# Patient Record
Sex: FEMALE | Race: BLACK OR AFRICAN AMERICAN | Employment: OTHER | URBAN - METROPOLITAN AREA
[De-identification: names, ages, dates, MRNs, and addresses within clinical notes are randomized per-mention and may not be internally consistent; named-entity substitution may affect disease eponyms.]

---

## 2017-01-04 ENCOUNTER — OFFICE VISIT (OUTPATIENT)
Dept: FAMILY MEDICINE | Facility: CLINIC | Age: 32
End: 2017-01-04
Payer: COMMERCIAL

## 2017-01-04 VITALS
TEMPERATURE: 99 F | HEART RATE: 74 BPM | SYSTOLIC BLOOD PRESSURE: 130 MMHG | DIASTOLIC BLOOD PRESSURE: 80 MMHG | RESPIRATION RATE: 16 BRPM | HEIGHT: 65 IN | OXYGEN SATURATION: 98 %

## 2017-01-04 DIAGNOSIS — Z23 NEED FOR PROPHYLACTIC VACCINATION AND INOCULATION AGAINST INFLUENZA: ICD-10-CM

## 2017-01-04 DIAGNOSIS — M62.838 MUSCLE SPASTICITY: Primary | ICD-10-CM

## 2017-01-04 DIAGNOSIS — E55.9 VITAMIN D DEFICIENCY: ICD-10-CM

## 2017-01-04 DIAGNOSIS — E03.9 ACQUIRED HYPOTHYROIDISM: ICD-10-CM

## 2017-01-04 DIAGNOSIS — G89.29 CHRONIC PAIN OF BOTH SHOULDERS: ICD-10-CM

## 2017-01-04 DIAGNOSIS — M25.511 CHRONIC PAIN OF BOTH SHOULDERS: ICD-10-CM

## 2017-01-04 DIAGNOSIS — M25.512 CHRONIC PAIN OF BOTH SHOULDERS: ICD-10-CM

## 2017-01-04 DIAGNOSIS — G82.20 PARAPLEGIA (H): ICD-10-CM

## 2017-01-04 PROCEDURE — 90686 IIV4 VACC NO PRSV 0.5 ML IM: CPT | Performed by: PHYSICIAN ASSISTANT

## 2017-01-04 PROCEDURE — 84439 ASSAY OF FREE THYROXINE: CPT | Performed by: PHYSICIAN ASSISTANT

## 2017-01-04 PROCEDURE — 84443 ASSAY THYROID STIM HORMONE: CPT | Performed by: PHYSICIAN ASSISTANT

## 2017-01-04 PROCEDURE — 36415 COLL VENOUS BLD VENIPUNCTURE: CPT | Performed by: PHYSICIAN ASSISTANT

## 2017-01-04 PROCEDURE — 99000 SPECIMEN HANDLING OFFICE-LAB: CPT | Performed by: PHYSICIAN ASSISTANT

## 2017-01-04 PROCEDURE — 99214 OFFICE O/P EST MOD 30 MIN: CPT | Mod: 25 | Performed by: PHYSICIAN ASSISTANT

## 2017-01-04 PROCEDURE — 82306 VITAMIN D 25 HYDROXY: CPT | Mod: 90 | Performed by: PHYSICIAN ASSISTANT

## 2017-01-04 PROCEDURE — 90471 IMMUNIZATION ADMIN: CPT | Performed by: PHYSICIAN ASSISTANT

## 2017-01-04 RX ORDER — CYCLOBENZAPRINE HCL 5 MG
5 TABLET ORAL 3 TIMES DAILY PRN
Qty: 90 TABLET | Refills: 3 | Status: SHIPPED | OUTPATIENT
Start: 2017-01-04 | End: 2017-03-22

## 2017-01-04 NOTE — NURSING NOTE
"Chief Complaint   Patient presents with     RECHECK     follow up for left shoulder pain        Initial /88 mmHg  Pulse 74  Temp(Src) 99  F (37.2  C) (Tympanic)  Resp 16  Ht 5' 5\" (1.651 m)  Wt   SpO2 98%  LMP 12/18/2016 Estimated body mass index is 26.79 kg/(m^2) as calculated from the following:    Height as of this encounter: 5' 5\" (1.651 m).    Weight as of 9/28/16: 161 lb (73.029 kg).  BP completed using cuff size: destinee Yi CMA      "

## 2017-01-04 NOTE — PROGRESS NOTES
Injectable Influenza Immunization Documentation    1.  Is the person to be vaccinated sick today?  No    2. Does the person to be vaccinated have an allergy to eggs or to a component of the vaccine?  No    3. Has the person to be vaccinated today ever had a serious reaction to influenza vaccine in the past?  No    4. Has the person to be vaccinated ever had Guillain-[place] syndrome?  No     Form completed by Yenni
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
## 2017-01-04 NOTE — Clinical Note
[facility]  [address]  [address]  [facility] [address]  [phone]                                                                                                           Gloria Bang  [address] [place] [address] [address]  [facility] [address]    January 4, 2017          To Whom It May Concern:    Patient is under my care and I certify she is unable to attend citizenship classes due to underlying medical conditions that make it difficult to leave her home without significant assistance. Underlying medical conditions also make it difficult to sit through class without significant discomfort.      Sincerely,          Gina Eli PA-C

## 2017-01-06 ENCOUNTER — TELEPHONE (OUTPATIENT)
Dept: FAMILY MEDICINE | Facility: CLINIC | Age: 32
End: 2017-01-06

## 2017-01-06 DIAGNOSIS — E03.9 ACQUIRED HYPOTHYROIDISM: Primary | ICD-10-CM

## 2017-01-06 LAB
DEPRECATED CALCIDIOL+CALCIFEROL SERPL-MC: 23 UG/L (ref 20–75)
T4 FREE SERPL-MCNC: 0.92 NG/DL (ref 0.76–1.46)
TSH SERPL DL<=0.005 MIU/L-ACNC: 10.27 MU/L (ref 0.4–4)

## 2017-01-06 RX ORDER — LEVOTHYROXINE SODIUM 88 UG/1
88 TABLET ORAL DAILY
Qty: 90 TABLET | Refills: 3 | Status: SHIPPED | OUTPATIENT
Start: 2017-01-06 | End: 2017-03-22

## 2017-01-06 NOTE — TELEPHONE ENCOUNTER
Please contact patient regarding her labs results (will need ):    Your TSH is elevated, suggesting your thyroid medication dose is too low. The actual thyroid level (T4) in your blood is normal. The goal of treatment is to have both your TSH and your T4 normal. I recommend starting a higher dose of your thyroid medication (88 mcg daily); I will send in a new prescription to your pharmacy. Please come in for a lab visit in 12 weeks to recheck your thyroid studies.    Your vitamin D level was normal.

## 2017-01-09 ENCOUNTER — TELEPHONE (OUTPATIENT)
Dept: FAMILY MEDICINE | Facility: CLINIC | Age: 32
End: 2017-01-09

## 2017-01-09 DIAGNOSIS — E55.9 VITAMIN D DEFICIENCY DISEASE: Primary | ICD-10-CM

## 2017-01-09 RX ORDER — CHOLECALCIFEROL (VITAMIN D3) 50 MCG
2000 TABLET ORAL DAILY
Qty: 90 TABLET | Refills: 4 | Status: SHIPPED | OUTPATIENT
Start: 2017-01-09 | End: 2018-05-07

## 2017-01-09 NOTE — TELEPHONE ENCOUNTER
Informed patient and sibling of message below. She verbalized understanding and agreement with plan.

## 2017-01-09 NOTE — TELEPHONE ENCOUNTER
"Pharmacist called re:     levothyroxine (SYNTHROID/LEVOTHROID) 88 MCG tablet 90 tablet 3 1/6/2017  No      Sig: Take 1 tablet (88 mcg) by mouth daily TAKE 1 TABLET (75 MCG) BY MOUTH DAILY     Should patient take 88mcg or 75mcg?     Per Gina Eli's note and Huddle: 88mcg.   \"I recommend starting a higher dose of your thyroid medication (88 mcg daily); I will send in a new prescription to your pharmacy. Please come in for a lab visit in 12 weeks to recheck your thyroid studies.\"  Pharmacist will include: pt is due for OV in 12 weeks for thyroid studies.   "

## 2017-01-09 NOTE — TELEPHONE ENCOUNTER
Patient and sibling Liz called requesting lab results. Please advice on results. Informed pt vitamin D normal low. Asked for vitamin D Rx.    TSH 10.27 (H) 0.40 - 4.00 mU/L     T4 Free 0.92 0.76 - 1.46 ng/dL     Component Value Ref Range & Units Status      Vitamin D Deficiency screening 23 20 - 75 ug/L Final     Comment:      Season, race, dietary intake, and treatment affect the concentration of    25-hydroxy-Vitamin D. Values may decrease during winter months and increase    during summer months. Values 20-29 ug/L may indicate Vitamin D insufficiency    and values <20 ug/L may indicate Vitamin D deficiency.    Vitamin D determination is routinely performed by an immunoassay specific for    25 hydroxyvitamin D3.  If an individual is on vitamin D2 (ergocalciferol)    supplementation, please specify 25 OH vitamin D2 and D3 level determination   by    LCMSMS test [id number].

## 2017-01-09 NOTE — TELEPHONE ENCOUNTER
"From results encounter on 1/6/2017: \"Your TSH is elevated, suggesting your thyroid medication dose is too low. The actual thyroid level (T4) in your blood is normal. The goal of treatment is to have both your TSH and your T4 normal. I recommend starting a higher dose of your thyroid medication (88 mcg daily); I will send in a new prescription to your pharmacy. Please come in for a lab visit in 12 weeks to recheck your thyroid studies.\"    Will send rx for vitamin D supplement.  "

## 2017-01-23 ENCOUNTER — TELEPHONE (OUTPATIENT)
Dept: FAMILY MEDICINE | Facility: CLINIC | Age: 32
End: 2017-01-23

## 2017-01-23 NOTE — Clinical Note
[facility]  [address]  [facility] [address]  [phone]                                                                                                           Gloria Bang  [address]  [facility] [address]    January 23, 2017          Dear Gloria Bang,      We care about your well-being!  In order to ensure we are providing the best quality care, we have reviewed your chart and see that you are due for:     __X___ Physical   __X___ Pap Smear   _____ Mammogram   _____ Diabetes Followup   _____ Hypertension Followup   _____ Cholesterol Followup (Provider Appointment)   _____ Cholesterol Test (Lab-Only Appointment)   _____ Other:       We can assist you in scheduling these appointments at [phone].    If you have had (or plan to have) any of these tests done at a facility other than [facility] or a [facility], please have the results from these tests sent to your primary physician at [facility].               Sincerely,    TONI Rodriguez

## 2017-01-23 NOTE — TELEPHONE ENCOUNTER
Panel Management Review      Patient has the following on her problem list: None      Composite cancer screening  Chart review shows that this patient is due/due soon for the following Pap Smear  Summary:    Patient is due/failing the following:   PAP and PHYSICAL    Action needed:   Patient needs office visit for Physical and Pap.    Type of outreach:    Sent letter.    Questions for provider review:    None                                                                                                                                    Danielle Yi CMA       Chart routed to .

## 2017-02-14 ENCOUNTER — HOSPITAL ENCOUNTER (OUTPATIENT)
Dept: PHYSICAL THERAPY | Facility: CLINIC | Age: 32
Setting detail: THERAPIES SERIES
End: 2017-02-14
Attending: PHYSICIAN ASSISTANT
Payer: COMMERCIAL

## 2017-02-14 PROCEDURE — 97161 PT EVAL LOW COMPLEX 20 MIN: CPT | Mod: GP | Performed by: PHYSICAL THERAPIST

## 2017-02-14 PROCEDURE — 40000719 ZZHC STATISTIC PT DEPARTMENT NEURO VISIT: Performed by: PHYSICAL THERAPIST

## 2017-02-15 DIAGNOSIS — M62.838 MUSCLE SPASTICITY: Primary | ICD-10-CM

## 2017-02-15 DIAGNOSIS — G82.20 PARAPLEGIA (H): ICD-10-CM
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
## 2017-03-22 ENCOUNTER — OFFICE VISIT (OUTPATIENT)
Dept: FAMILY MEDICINE | Facility: CLINIC | Age: 32
End: 2017-03-22
Payer: COMMERCIAL

## 2017-03-22 VITALS — TEMPERATURE: 97.7 F | RESPIRATION RATE: 16 BRPM | HEART RATE: 86 BPM | OXYGEN SATURATION: 100 %

## 2017-03-22 DIAGNOSIS — N39.45 CONTINUOUS LEAKAGE(788.37): ICD-10-CM

## 2017-03-22 DIAGNOSIS — G82.20 PARAPLEGIA (H): Primary | ICD-10-CM

## 2017-03-22 DIAGNOSIS — M62.838 MUSCLE SPASTICITY: ICD-10-CM

## 2017-03-22 DIAGNOSIS — E03.9 ACQUIRED HYPOTHYROIDISM: ICD-10-CM

## 2017-03-22 LAB
T4 FREE SERPL-MCNC: 0.82 NG/DL (ref 0.76–1.46)
TSH SERPL DL<=0.005 MIU/L-ACNC: 13.18 MU/L (ref 0.4–4)

## 2017-03-22 PROCEDURE — 99214 OFFICE O/P EST MOD 30 MIN: CPT | Performed by: PHYSICIAN ASSISTANT

## 2017-03-22 PROCEDURE — 84439 ASSAY OF FREE THYROXINE: CPT | Performed by: PHYSICIAN ASSISTANT

## 2017-03-22 PROCEDURE — 36415 COLL VENOUS BLD VENIPUNCTURE: CPT | Performed by: PHYSICIAN ASSISTANT

## 2017-03-22 PROCEDURE — 84443 ASSAY THYROID STIM HORMONE: CPT | Performed by: PHYSICIAN ASSISTANT
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
## 2017-03-22 NOTE — TELEPHONE ENCOUNTER
Please contact patient with recent lab results (will need ):    Your TSH is still high, indicating your thyroid medication dose is too low. Please verify patient is taking 88 mcg dose.     If not, please start taking 88 mcg dose. If already on 88 mcg, would like to increase to 100 mcg dose and recheck labs in 10-12 weeks.    100 mcg dose pended, ok to send to pharmacy if indicated. Future labs ordered.

## 2017-03-22 NOTE — MR AVS SNAPSHOT
After Visit Summary   3/22/2017    Gloria Bang    MRN: [id number]           Patient Information     Date Of Birth          1985        Visit Information        Provider Department      3/22/2017 9:10 AM Gina Eli PA-C; [employer] [facility]        Today's Diagnoses     Paraplegia (H)    -  1    Continuous leakage        Acquired hypothyroidism        Muscle spasticity           Follow-ups after your visit        Your next 10 appointments already scheduled     Mar 29, 2017  8:00 AM CDT   (Arrive by 7:45 AM)   New Patient Visit with Elio Dejesus MD   [facility] Physical Medicine and Rehabilitation ([facility])    [address]  3rd Floor  [facility] [address] [phone]              Future tests that were ordered for you today     Open Future Orders        Priority Expected Expires Ordered    **TSH with free T4 reflex FUTURE 2mo Routine 5/21/2017 7/20/2017 3/22/2017            Who to contact     If you have questions or need follow up information about today's clinic visit or your schedule please contact [facility] directly at [phone].  Normal or non-critical lab and imaging results will be communicated to you by MyChart, letter or phone within 4 business days after the clinic has received the results. If you do not hear from us within 7 days, please contact the clinic through [employer]chmasch[employer] publishinghart or phone. If you have a critical or abnormal lab result, we will notify you by phone as soon as possible.  Submit refill requests through [employer] or call your pharmacy and they will forward the refill request to us. Please allow 3 business days for your refill to be completed.          Additional Information About Your Visit        menschmaschine publishinghart Information     [employer] lets you send messages to your doctor, view your test results, renew your prescriptions, schedule appointments  "and more. To sign up, go to [email or web address] . Click on \"Log in\" on the left side of the screen, which will take you to the Welcome page. Then click on \"Sign up Now\" on the right side of the page.     You will be asked to enter the access code listed below, as well as some personal information. Please follow the directions to create your username and password.     Your access code is: [email or web address]  Expires: 2017 12:10 PM     Your access code will  in 90 days. If you need help or a new code, please call your [place] clinic or [phone].        Care EveryWhere ID     This is your Care EveryWhere ID. This could be used by other organizations to access your [place] medical records  [id number]        Your Vitals Were     Pulse Temperature Respirations Pulse Oximetry          86 97.7  F (36.5  C) (Tympanic) 16 100%         Blood Pressure from Last 3 Encounters:   17 130/80   16 134/86   16 140/75    Weight from Last 3 Encounters:   16 161 lb (73 kg)   16 161 lb (73 kg)   16 161 lb (73 kg)              We Performed the Following     T4 free     TSH with free T4 reflex          Today's Medication Changes          These changes are accurate as of: 3/22/17  3:11 PM.  If you have any questions, ask your nurse or doctor.               These medicines have changed or have updated prescriptions.        Dose/Directions    bisacodyl 10 MG Suppository   Commonly known as:  DULCOLAX   This may have changed:  Another medication with the same name was removed. Continue taking this medication, and follow the directions you see here.   Used for:  Other constipation   Changed by:  Gina Eli PA-C        Dose:  10 mg   Place 1 suppository (10 mg) rectally daily as needed   Quantity:  30 suppository   Refills:  11       cetirizine 10 MG tablet   Commonly known as:  zyrTEC   This may have changed:    - when to take this  - reasons to take this   Used for:  Chronic rhinitis "        Dose:  10 mg   Take 1 tablet (10 mg) by mouth every evening   Quantity:  90 tablet   Refills:  3         Stop taking these medicines if you haven't already. Please contact your care team if you have questions.     cyclobenzaprine 5 MG tablet   Commonly known as:  FLEXERIL   Stopped by:  Gina Eli PA-C           dantrolene 25 MG capsule   Commonly known as:  DANTRIUM   Stopped by:  Gina Eli PA-C                Where to get your medicines      Some of these will need a paper prescription and others can be bought over the counter.  Ask your nurse if you have questions.     Bring a paper prescription for each of these medications     order for DME    order for DME                Primary Care Provider Office Phone # Fax #    Gina Eli PA-C [phone]       [facility] [address] [facility] [address]  [facility] [address]        Thank you!     Thank you for choosing [facility]  for your care. Our goal is always to provide you with excellent care. Hearing back from our patients is one way we can continue to improve our services. Please take a few minutes to complete the written survey that you may receive in the mail after your visit with us. Thank you!             Your Updated Medication List - Protect others around you: Learn how to safely use, store and throw away your medicines at www.disposemymeds.org.          This list is accurate as of: 3/22/17  3:11 PM.  Always use your most recent med list.                   Brand Name Dispense Instructions for use    KASSANDRA REID GREASELESS cream     113 g    Apply topically every 6 hours as needed       bisacodyl 10 MG Suppository    DULCOLAX    30 suppository    Place 1 suppository (10 mg) rectally daily as needed       cetirizine 10 MG tablet    zyrTEC    90 tablet    Take 1 tablet (10 mg) by mouth every evening       chlorhexidine 0.12 % solution    PERIDEX         * cholecalciferol  1000 UNIT tablet    vitamin D         * vitamin D 2000 UNITS tablet     90 tablet    Take 2,000 Units by mouth daily       fluticasone 50 MCG/ACT spray    FLONASE    3 Bottle    Spray 1 spray into both nostrils 2 times daily       folic acid 1 MG tablet    FOLVITE    100 tablet    Take 1 tablet (1 mg) by mouth daily       ibuprofen 600 MG tablet    ADVIL/MOTRIN    30 tablet    Take 1 tablet (600 mg) by mouth every 8 hours as needed for moderate pain       labetalol 100 MG tablet    NORMODYNE    90 tablet    Take 1 tablet (100 mg) by mouth daily       levothyroxine 88 MCG tablet    SYNTHROID/LEVOTHROID    90 tablet    Take 1 tablet (88 mcg) by mouth daily TAKE 1 TABLET (75 MCG) BY MOUTH DAILY       order for DME     1 Device    Equipment being ordered: electric williams lift that can be operated by one person.Patient's Height is 5 feet 9 inches; weight as of 8/15/13 is 160 pounds       * order for DME     1 Box    Equipment being ordered: gauze 4*4 pack of 100, refill prn       * order for DME     100 Units    Equipment being ordered: 4*4 gauzes for dressing change       * order for DME     1 each    BP cuff, brand as covered by insurance.  Dx: HTN       * order for DME     1 each    BP cuff, brand as covered by insurance.  Dx: HTN       * order for DME     90 Units    Equipment being ordered:  adult diapers for urinary incontinence - Fax to [employer] [phone]       * order for DME     120 pad    Equipment being ordered: Chux       Ovulation Predictor Kit     1 kit    Use daily Day 7 and 8 of cycle       * Notice:  This list has 8 medication(s) that are the same as other medications prescribed for you. Read the directions carefully, and ask your doctor or other care provider to review them with you.

## 2017-03-23 RX ORDER — LEVOTHYROXINE SODIUM 100 UG/1
100 TABLET ORAL DAILY
Qty: 90 TABLET | Refills: 0 | Status: SHIPPED | OUTPATIENT
Start: 2017-03-23 | End: 2017-06-05

## 2017-03-23 NOTE — TELEPHONE ENCOUNTER
Patient and sister called requesting lab results. Informed pt of TSH results. She reports she is taking 88 mcg dose. Rx sent with 100 mcg dose and advised pt recheck in 10-12 wks as requested below. Pt verbalized understanding and agreement with plan.

## 2017-03-29 ENCOUNTER — OFFICE VISIT (OUTPATIENT)
Dept: PHYSICAL MEDICINE AND REHAB | Facility: CLINIC | Age: 32
End: 2017-03-29

## 2017-03-29 VITALS — HEIGHT: 65 IN | SYSTOLIC BLOOD PRESSURE: 164 MMHG | DIASTOLIC BLOOD PRESSURE: 80 MMHG | HEART RATE: 83 BPM

## 2017-03-29 DIAGNOSIS — G82.20 PARAPLEGIA (H): ICD-10-CM

## 2017-03-29 DIAGNOSIS — M62.838 MUSCLE SPASM: Primary | ICD-10-CM

## 2017-03-29 RX ORDER — TIZANIDINE 2 MG/1
2 TABLET ORAL 3 TIMES DAILY
Qty: 90 TABLET | Refills: 3 | Status: SHIPPED | OUTPATIENT
Start: 2017-03-29 | End: 2018-02-19

## 2017-03-29 ASSESSMENT — PAIN SCALES - GENERAL: PAINLEVEL: NO PAIN (0)

## 2017-03-29 NOTE — MR AVS SNAPSHOT
After Visit Summary   3/29/2017    Gloria Bang    MRN: [id number]           Patient Information     Date Of Birth          1985        Visit Information        Provider Department      3/29/2017 7:45 AM Elio Dejesus MD; [employer] [facility] Physical Medicine and Rehabilitation        Today's Diagnoses     Muscle spasm    -  1       Follow-ups after your visit        Your next 10 appointments already scheduled     May 24, 2017  8:00 AM CDT   (Arrive by 7:45 AM)   Return Visit with Elio Dejesus MD   [facility] Physical Medicine and Rehabilitation ([facility])    [address] [place] [address] [phone]              Who to contact     Please call your clinic at [phone] to:    Ask questions about your health    Make or cancel appointments    Discuss your medicines    Learn about your test results    Speak to your doctor   If you have compliments or concerns about an experience at your clinic, or if you wish to file a complaint, please contact [facility] Patient Relations at [phone] or email us at [email or web address]         Additional Information About Your Visit        MyChart Information     [employer] is an electronic gateway that provides easy, online access to your medical records. With [employer], you can request a clinic appointment, read your test results, renew a prescription or communicate with your care team.     To sign up for [employer] visit the website at [email or web address]   You will be asked to enter the access code listed below, as well as some personal information. Please follow the directions to create your username and password.     Your access code is: [email or web address]  Expires: 2017 12:10 PM     Your access code will  in 90 days. If you need help or a new code, please contact your [facility] or call [phone] for  "assistance.        Care EveryWhere ID     This is your Care EveryWhere ID. This could be used by other organizations to access your [place] medical records  [id number]        Your Vitals Were     Pulse Height                83 1.651 m (5' 5\")           Blood Pressure from Last 3 Encounters:   03/29/17 164/80   01/04/17 130/80   09/28/16 134/86    Weight from Last 3 Encounters:   09/28/16 73 kg (161 lb)   03/29/16 73 kg (161 lb)   02/23/16 73 kg (161 lb)              Today, you had the following     No orders found for display         Today's Medication Changes          These changes are accurate as of: 3/29/17  9:39 AM.  If you have any questions, ask your nurse or doctor.               Start taking these medicines.        Dose/Directions    tiZANidine 2 MG tablet   Commonly known as:  ZANAFLEX   Used for:  Muscle spasm   Started by:  Elio Dejesus MD        Dose:  2 mg   Take 1 tablet (2 mg) by mouth 3 times daily   Quantity:  90 tablet   Refills:  3         These medicines have changed or have updated prescriptions.        Dose/Directions    cetirizine 10 MG tablet   Commonly known as:  zyrTEC   This may have changed:    - when to take this  - reasons to take this   Used for:  Chronic rhinitis        Dose:  10 mg   Take 1 tablet (10 mg) by mouth every evening   Quantity:  90 tablet   Refills:  3            Where to get your medicines      These medications were sent to [facility] - [address]  [address] [facility] [address]     Phone:  [phone]     tiZANidine 2 MG tablet                Primary Care Provider Office Phone # Fax #    Gina Eli PA-C [phone]       [facility] [address]  [facility] [address]        Thank you!     Thank you for choosing [facility] PHYSICAL MEDICINE AND REHABILITATION  for your care. Our goal is always to provide you with excellent care. Hearing back from our patients is one way we can continue " to improve our services. Please take a few minutes to complete the written survey that you may receive in the mail after your visit with us. Thank you!             Your Updated Medication List - Protect others around you: Learn how to safely use, store and throw away your medicines at www.disposemymeds.org.          This list is accurate as of: 3/29/17  9:39 AM.  Always use your most recent med list.                   Brand Name Dispense Instructions for use    KASSANDRA REID GREASELESS cream     113 g    Apply topically every 6 hours as needed       bisacodyl 10 MG Suppository    DULCOLAX    30 suppository    Place 1 suppository (10 mg) rectally daily as needed       cetirizine 10 MG tablet    zyrTEC    90 tablet    Take 1 tablet (10 mg) by mouth every evening       chlorhexidine 0.12 % solution    PERIDEX         * cholecalciferol 1000 UNIT tablet    vitamin D         * vitamin D 2000 UNITS tablet     90 tablet    Take 2,000 Units by mouth daily       fluticasone 50 MCG/ACT spray    FLONASE    3 Bottle    Spray 1 spray into both nostrils 2 times daily       folic acid 1 MG tablet    FOLVITE    100 tablet    Take 1 tablet (1 mg) by mouth daily       ibuprofen 600 MG tablet    ADVIL/MOTRIN    30 tablet    Take 1 tablet (600 mg) by mouth every 8 hours as needed for moderate pain       labetalol 100 MG tablet    NORMODYNE    90 tablet    Take 1 tablet (100 mg) by mouth daily       levothyroxine 100 MCG tablet    SYNTHROID/LEVOTHROID    90 tablet    Take 1 tablet (100 mcg) by mouth daily       order for DME     1 Device    Equipment being ordered: electric williams lift that can be operated by one person.Patient's Height is 5 feet 9 inches; weight as of 8/15/13 is 160 pounds       * order for DME     1 Box    Equipment being ordered: gauze 4*4 pack of 100, refill prn       * order for DME     100 Units    Equipment being ordered: 4*4 gauzes for dressing change       * order for DME     1 each    BP cuff, brand as covered by  insurance.  Dx: HTN       * order for DME     1 each    BP cuff, brand as covered by insurance.  Dx: HTN       * order for DME     90 Units    Equipment being ordered:  adult diapers for urinary incontinence - Fax to [employer] [phone]       * order for DME     120 pad    Equipment being ordered: Chux       Ovulation Predictor Kit     1 kit    Use daily Day 7 and 8 of cycle       tiZANidine 2 MG tablet    ZANAFLEX    90 tablet    Take 1 tablet (2 mg) by mouth 3 times daily       * Notice:  This list has 8 medication(s) that are the same as other medications prescribed for you. Read the directions carefully, and ask your doctor or other care provider to review them with you.
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
## 2017-04-06 ENCOUNTER — TELEPHONE (OUTPATIENT)
Dept: FAMILY MEDICINE | Facility: CLINIC | Age: 32
End: 2017-04-06

## 2017-04-06 NOTE — TELEPHONE ENCOUNTER
Reason for Call:  Form, our goal is to have forms completed with 72 hours, however, some forms may require a visit or additional information.    Type of letter, form or note:      Who is the form from?: [employer] (if other please explain)    Where did the form come from: form was faxed in    What clinic location was the form placed at?: [facility]    Where the form was placed: 's Box: TONI Rodriguez    What number is listed as a contact on the form?: [phone]       Additional comments:     Call taken on 4/6/2017 at 2:28 PM by Zamzam Granados

## 2017-04-13 ENCOUNTER — TELEPHONE (OUTPATIENT)
Dept: PHYSICAL MEDICINE AND REHAB | Facility: CLINIC | Age: 32
End: 2017-04-13

## 2017-04-13 NOTE — TELEPHONE ENCOUNTER
Janie, patient's sister called with update on the effectiveness of the Tizanidine dosing for her muscle spasticity. Patient states that she gets very sleepy and cannot keep her eyes open and cannot enjoy her day because she is so tired. She is attending school and falls asleep. The medication does help relax the leg muscles, but it is not worth it, if she cannot function during the day. She is presently taking Tizanidine 2 mg tablets TID. She was instructed to try one half tablet three times a day. If she continues to be too sleepy with this dosing, she will call PMR clinic to discuss dosing. Dr Dejesus notified of above.

## 2017-04-20 ENCOUNTER — TELEPHONE (OUTPATIENT)
Dept: FAMILY MEDICINE | Facility: CLINIC | Age: 32
End: 2017-04-20

## 2017-04-20 NOTE — LETTER
[facility]  [address]  [place] [address]  [phone]                                                                                                           Gloria Bang  [address]  [place] [address]    April 20, 2017          Dear Gloria Bang,      We care about your well-being!  In order to ensure we are providing the best quality care, we have reviewed your chart and see that you are due for:     __x___ Physical   __x___ Pap Smear   _____ Mammogram   _____ Diabetes Followup   _____ Hypertension Followup   _____ Cholesterol Followup (Provider Appointment)   _____ Cholesterol Test (Lab-Only Appointment)   _____ Other:       We can assist you in scheduling these appointments at [phone].    If you have had (or plan to have) any of these tests done at a facility other than [facility] or a [facility], please have the results from these tests sent to your primary physician at [facility].                 Sincerely,    TONI Rodriguez

## 2017-04-20 NOTE — TELEPHONE ENCOUNTER
Panel Management Review      Patient has the following on her problem list:     Hypertension   Last three blood pressure readings:  BP Readings from Last 3 Encounters:   03/29/17 164/80   01/04/17 130/80   09/28/16 134/86     Blood pressure: FAILED    HTN Guidelines:  Age 18-59 BP range:  Less than 140/90  Age 60-85 with Diabetes:  Less than 140/90  Age 60-85 without Diabetes:  less than 150/90      Composite cancer screening  Chart review shows that this patient is due/due soon for the following Pap Smear  Summary:    Patient is due/failing the following:   BP CHECK, PAP and PHYSICAL    Action needed:   Patient needs office visit for physical/PAP    Type of outreach:    Sent letter.    Questions for provider review:    None                                                                                                                                    [facility]       Chart routed to self.

## 2017-05-10 DIAGNOSIS — M62.838 OTHER MUSCLE SPASM: Primary | ICD-10-CM

## 2017-05-24 ENCOUNTER — OFFICE VISIT (OUTPATIENT)
Dept: PHYSICAL MEDICINE AND REHAB | Facility: CLINIC | Age: 32
End: 2017-05-24

## 2017-05-24 VITALS — HEIGHT: 65 IN | SYSTOLIC BLOOD PRESSURE: 163 MMHG | HEART RATE: 78 BPM | DIASTOLIC BLOOD PRESSURE: 68 MMHG

## 2017-05-24 DIAGNOSIS — M62.838 MUSCLE SPASTICITY: Primary | ICD-10-CM

## 2017-05-24 DIAGNOSIS — G82.20 PARAPLEGIA (H): ICD-10-CM

## 2017-05-24 ASSESSMENT — PAIN SCALES - GENERAL: PAINLEVEL: NO PAIN (0)
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
## 2017-05-24 NOTE — MR AVS SNAPSHOT
After Visit Summary   5/24/2017    Gloria Bang    MRN: [id number]           Patient Information     Date Of Birth          1985        Visit Information        Provider Department      5/24/2017 7:45 AM Elio Dejesus MD; [employer] [facility]         Follow-ups after your visit        Your next 10 appointments already scheduled     Aug 23, 2017  8:40 AM CDT   (Arrive by 8:25 AM)   Return Botox with Elio Dejesus MD   [facility] ([facility])    [address] [place] [address] [id number] [phone]            Nov 15, 2017  8:40 AM CST   (Arrive by 8:25 AM)   Return Botox with Elio Dejesus MD   [facility] ([facility])    [address] [place] [address] [id number] [phone]              Who to contact     Please call your clinic at [phone] to:    Ask questions about your health    Make or cancel appointments    Discuss your medicines    Learn about your test results    Speak to your doctor   If you have compliments or concerns about an experience at your clinic, or if you wish to file a complaint, please contact [facility] Patient Relations at [phone] or email us at [email or web address]         Additional Information About Your Visit        MyChart Information     [employer] is an electronic gateway that provides easy, online access to your medical records. With [employer], you can request a clinic appointment, read your test results, renew a prescription or communicate with your care team.     To sign up for [employer] visit the website at [email or web address]   You will be asked to enter the access code listed below, as well as some personal information. Please follow the directions to create your username and password.     Your access code  "is: [email or web address]  Expires: 2017  6:30 AM     Your access code will  in 90 days. If you need help or a new code, please contact your [facility] or call [phone] for assistance.        Care EveryWhere ID     This is your Care EveryWhere ID. This could be used by other organizations to access your [place] medical records  [id number]        Your Vitals Were     Pulse Height                78 1.651 m (5' 5\")           Blood Pressure from Last 3 Encounters:   17 163/68   17 164/80   17 130/80    Weight from Last 3 Encounters:   16 73 kg (161 lb)   16 73 kg (161 lb)   16 73 kg (161 lb)              Today, you had the following     No orders found for display       Primary Care Provider Office Phone # Fax #    Gina Eli PA-C [phone]       [facility] [address] [address]  [facility] [address]        Thank you!     Thank you for choosing [facility]  for your care. Our goal is always to provide you with excellent care. Hearing back from our patients is one way we can continue to improve our services. Please take a few minutes to complete the written survey that you may receive in the mail after your visit with us. Thank you!             Your Updated Medication List - Protect others around you: Learn how to safely use, store and throw away your medicines at www.disposemymeds.org.          This list is accurate as of: 17  9:08 AM.  Always use your most recent med list.                   Brand Name Dispense Instructions for use    KASSANDRA REID GREASELESS cream     113 g    Apply topically every 6 hours as needed       bisacodyl 10 MG Suppository    DULCOLAX    30 suppository    Place 1 suppository (10 mg) rectally daily as needed       botulinum toxin type A 100 UNITS injection    BOTOX    400 Units    Inject 400 Units into the muscle every 3 months       " chlorhexidine 0.12 % solution    PERIDEX         * cholecalciferol 1000 UNIT tablet    vitamin D         * vitamin D 2000 UNITS tablet     90 tablet    Take 2,000 Units by mouth daily       fluticasone 50 MCG/ACT spray    FLONASE    3 Bottle    Spray 1 spray into both nostrils 2 times daily       labetalol 100 MG tablet    NORMODYNE    90 tablet    Take 1 tablet (100 mg) by mouth daily       levothyroxine 100 MCG tablet    SYNTHROID/LEVOTHROID    90 tablet    Take 1 tablet (100 mcg) by mouth daily       order for DME     1 Device    Equipment being ordered: electric williams lift that can be operated by one person.Patient's Height is 5 feet 9 inches; weight as of 8/15/13 is 160 pounds       * order for DME     1 Box    Equipment being ordered: gauze 4*4 pack of 100, refill prn       * order for DME     100 Units    Equipment being ordered: 4*4 gauzes for dressing change       * order for DME     1 each    BP cuff, brand as covered by insurance.  Dx: HTN       * order for DME     1 each    BP cuff, brand as covered by insurance.  Dx: HTN       * order for DME     90 Units    Equipment being ordered:  adult diapers for urinary incontinence - Fax to [employer] [phone]       * order for DME     120 pad    Equipment being ordered: [employer]       Ovulation Predictor Kit     1 kit    Use daily Day 7 and 8 of cycle       tiZANidine 2 MG tablet    ZANAFLEX    90 tablet    Take 1 tablet (2 mg) by mouth 3 times daily       * Notice:  This list has 8 medication(s) that are the same as other medications prescribed for you. Read the directions carefully, and ask your doctor or other care provider to review them with you.
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
## 2017-06-02 ENCOUNTER — OFFICE VISIT (OUTPATIENT)
Dept: FAMILY MEDICINE | Facility: CLINIC | Age: 32
End: 2017-06-02
Payer: COMMERCIAL

## 2017-06-02 ENCOUNTER — TELEPHONE (OUTPATIENT)
Dept: FAMILY MEDICINE | Facility: CLINIC | Age: 32
End: 2017-06-02

## 2017-06-02 VITALS
HEART RATE: 77 BPM | DIASTOLIC BLOOD PRESSURE: 70 MMHG | RESPIRATION RATE: 16 BRPM | BODY MASS INDEX: 26.79 KG/M2 | WEIGHT: 161 LBS | SYSTOLIC BLOOD PRESSURE: 136 MMHG | TEMPERATURE: 98 F

## 2017-06-02 DIAGNOSIS — G82.20 PARAPLEGIA (H): ICD-10-CM

## 2017-06-02 DIAGNOSIS — E03.9 ACQUIRED HYPOTHYROIDISM: Primary | ICD-10-CM

## 2017-06-02 DIAGNOSIS — M62.838 MUSCLE SPASTICITY: Primary | ICD-10-CM

## 2017-06-02 PROCEDURE — 84443 ASSAY THYROID STIM HORMONE: CPT | Performed by: PHYSICIAN ASSISTANT

## 2017-06-02 PROCEDURE — 99214 OFFICE O/P EST MOD 30 MIN: CPT | Performed by: PHYSICIAN ASSISTANT

## 2017-06-02 PROCEDURE — 36415 COLL VENOUS BLD VENIPUNCTURE: CPT | Performed by: PHYSICIAN ASSISTANT

## 2017-06-02 RX ORDER — CAPSAICIN 0.025 %
1 CREAM (GRAM) TOPICAL 3 TIMES DAILY PRN
Qty: 60 G | Refills: 11 | Status: SHIPPED | OUTPATIENT
Start: 2017-06-02 | End: 2018-02-19
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
## 2017-06-02 NOTE — NURSING NOTE
"Chief Complaint   Patient presents with     Recheck Medication     Thyroid Problem       Initial /70  Pulse 77  Temp 98  F (36.7  C) (Tympanic)  Resp 16  Wt 161 lb (73 kg)  BMI 26.79 kg/m2 Estimated body mass index is 26.79 kg/(m^2) as calculated from the following:    Height as of 5/24/17: 5' 5\" (1.651 m).    Weight as of this encounter: 161 lb (73 kg).  Medication Reconciliation: complete     Clare Escalera CMA      "

## 2017-06-02 NOTE — MR AVS SNAPSHOT
After Visit Summary   6/2/2017    Gloria Bang    MRN: [id number]           Patient Information     Date Of Birth          1985        Visit Information        Provider Department      6/2/2017 8:10 AM Gina Eli PA-C; [employer] [facility]        Today's Diagnoses     Acquired hypothyroidism    -  1    Paraplegia (H)          Care Instructions    763-          Follow-ups after your visit        Your next 10 appointments already scheduled     Aug 23, 2017  8:40 AM CDT   (Arrive by 8:25 AM)   Return Botox with Elio Dejesus MD   [facility] Physical Medicine and Rehabilitation ([facility])    [address] [place] [address] [id number] [phone]            Nov 15, 2017  8:40 AM CST   (Arrive by 8:25 AM)   Return Botox with Elio Dejesus MD   [facility] Physical Medicine and Rehabilitation ([facility])    [address] [place] [id number] [phone]              Who to contact     If you have questions or need follow up information about today's clinic visit or your schedule please contact [facility] directly at [phone].  Normal or non-critical lab and imaging results will be communicated to you by MyChart, letter or phone within 4 business days after the clinic has received the results. If you do not hear from us within 7 days, please contact the clinic through [employer] or phone. If you have a critical or abnormal lab result, we will notify you by phone as soon as possible.  Submit refill requests through [employer] or call your pharmacy and they will forward the refill request to us. Please allow 3 business days for your refill to be completed.          Additional Information About Your Visit        [employer] Information     [employer] lets you send messages to your doctor, view your test results, renew your  "prescriptions, schedule appointments and more. To sign up, go to [email or web address] . Click on \"Log in\" on the left side of the screen, which will take you to the Welcome page. Then click on \"Sign up Now\" on the right side of the page.     You will be asked to enter the access code listed below, as well as some personal information. Please follow the directions to create your username and password.     Your access code is: [email or web address]  Expires: 2017  6:30 AM     Your access code will  in 90 days. If you need help or a new code, please call your [place] clinic or [phone].        Care EveryWhere ID     This is your Care EveryWhere ID. This could be used by other organizations to access your [place] medical records  [id number]        Your Vitals Were     Pulse Temperature Respirations BMI (Body Mass Index)          77 98  F (36.7  C) (Tympanic) 16 26.79 kg/m2         Blood Pressure from Last 3 Encounters:   17 136/70   17 163/68   17 164/80    Weight from Last 3 Encounters:   17 161 lb (73 kg)   16 161 lb (73 kg)   16 161 lb (73 kg)              We Performed the Following     TSH with free T4 reflex        Primary Care Provider Office Phone # Fax #    Gina Eli PA-C [phone]       [facility] [address] [facility] [address]  [facility] [address]        Thank you!     Thank you for choosing [facility]  for your care. Our goal is always to provide you with excellent care. Hearing back from our patients is one way we can continue to improve our services. Please take a few minutes to complete the written survey that you may receive in the mail after your visit with us. Thank you!             Your Updated Medication List - Protect others around you: Learn how to safely use, store and throw away your medicines at www.disposemymeds.org.          This list is accurate as of: 17  9:13 AM.  " Always use your most recent med list.                   Brand Name Dispense Instructions for use    KASSANDRA REID GREASELESS cream     113 g    Apply topically every 6 hours as needed       bisacodyl 10 MG Suppository    DULCOLAX    30 suppository    Place 1 suppository (10 mg) rectally daily as needed       botulinum toxin type A 100 UNITS injection    BOTOX    400 Units    Inject 400 Units into the muscle every 3 months       chlorhexidine 0.12 % solution    PERIDEX         * cholecalciferol 1000 UNIT tablet    vitamin D         * vitamin D 2000 UNITS tablet     90 tablet    Take 2,000 Units by mouth daily       fluticasone 50 MCG/ACT spray    FLONASE    3 Bottle    Spray 1 spray into both nostrils 2 times daily       labetalol 100 MG tablet    NORMODYNE    90 tablet    Take 1 tablet (100 mg) by mouth daily       levothyroxine 100 MCG tablet    SYNTHROID/LEVOTHROID    90 tablet    Take 1 tablet (100 mcg) by mouth daily       order for DME     1 Device    Equipment being ordered: electric williams lift that can be operated by one person.Patient's Height is 5 feet 9 inches; weight as of 8/15/13 is 160 pounds       * order for DME     1 Box    Equipment being ordered: gauze 4*4 pack of 100, refill prn       * order for DME     100 Units    Equipment being ordered: 4*4 gauzes for dressing change       * order for DME     1 each    BP cuff, brand as covered by insurance.  Dx: HTN       * order for DME     1 each    BP cuff, brand as covered by insurance.  Dx: HTN       * order for DME     90 Units    Equipment being ordered:  adult diapers for urinary incontinence - Fax to [facility] [phone]       * order for DME     120 pad    Equipment being ordered: Chux       Ovulation Predictor Kit     1 kit    Use daily Day 7 and 8 of cycle       tiZANidine 2 MG tablet    ZANAFLEX    90 tablet    Take 1 tablet (2 mg) by mouth 3 times daily       * Notice:  This list has 8 medication(s) that are the same as other  medications prescribed for you. Read the directions carefully, and ask your doctor or other care provider to review them with you.

## 2017-06-02 NOTE — LETTER
[facility]  [address]  [address]  [facility] [address]  [phone]                                                                                                           Gloria Bang  [address] [place] [address] [address]  [facility] [address]    June 5, 2017      Dear Gloria,    The results of your recent tests were reviewed and are enclosed.     - Your TSH was normal, indicating that you are on the correct dose of thyroid medication.  I sent additional refills of your current medication dose to your pharmacy.    Results for orders placed or performed in visit on 06/02/17   TSH with free T4 reflex   Result Value Ref Range    TSH 3.03 0.40 - 4.00 mU/L       Thank you for choosing [facility].  We appreciate the opportunity to serve you and look forward to supporting your healthcare needs in the future.    If you have any questions or concerns, please call me or my staff at [phone].      Sincerely,        Gina Eli PA-C

## 2017-06-02 NOTE — TELEPHONE ENCOUNTER
Capasaicin cream 0.025%      Last Written Prescription Date:  na  Last Fill Quantity: na,   # refills: na  Last Office Visit with G, P or [facility] prescribing provider: 6/2/17  Future Office visit:       Routing refill request to provider for review/approval because:  Medication is reported/historical    Pt is requesting a prescription for this medication. Pt has used her sister's cream and states it worked well for her so she'd a prescription. She forgot to bring this up at her visit with you today.

## 2017-06-03 LAB — TSH SERPL DL<=0.005 MIU/L-ACNC: 3.03 MU/L (ref 0.4–4)

## 2017-06-05 DIAGNOSIS — E03.9 ACQUIRED HYPOTHYROIDISM: ICD-10-CM

## 2017-06-05 RX ORDER — LEVOTHYROXINE SODIUM 100 UG/1
100 TABLET ORAL DAILY
Qty: 90 TABLET | Refills: 2 | Status: SHIPPED | OUTPATIENT
Start: 2017-06-05 | End: 2018-07-02

## 2017-07-18 ENCOUNTER — TELEPHONE (OUTPATIENT)
Dept: FAMILY MEDICINE | Facility: CLINIC | Age: 32
End: 2017-07-18

## 2017-07-18 NOTE — TELEPHONE ENCOUNTER
Our goal is to have forms completed within 72 hours, however some forms may require a visit or additional information.    What clinic location was the form placed at [facility] or [place].?     Who is the form from? [employer]  Where did the form come from? Faxed to clinic   The form was placed in the inbox of TONI Rodriguez      Please fax to [phone]    Additional comments: Labor repair 15 mins    Call take on 7/18/2017 at 2:49 PM by Linda Shankar

## 2017-07-26 ENCOUNTER — OFFICE VISIT (OUTPATIENT)
Dept: FAMILY MEDICINE | Facility: CLINIC | Age: 32
End: 2017-07-26
Payer: COMMERCIAL

## 2017-07-26 VITALS
OXYGEN SATURATION: 96 % | DIASTOLIC BLOOD PRESSURE: 89 MMHG | HEIGHT: 65 IN | WEIGHT: 161 LBS | BODY MASS INDEX: 26.82 KG/M2 | SYSTOLIC BLOOD PRESSURE: 134 MMHG | TEMPERATURE: 99.6 F | HEART RATE: 76 BPM | RESPIRATION RATE: 18 BRPM

## 2017-07-26 DIAGNOSIS — K59.09 OTHER CONSTIPATION: ICD-10-CM

## 2017-07-26 DIAGNOSIS — M62.838 MUSCLE SPASTICITY: Primary | ICD-10-CM

## 2017-07-26 DIAGNOSIS — G82.20 PARAPLEGIA (H): ICD-10-CM

## 2017-07-26 DIAGNOSIS — M25.512 CHRONIC LEFT SHOULDER PAIN: ICD-10-CM

## 2017-07-26 DIAGNOSIS — G89.29 CHRONIC LEFT SHOULDER PAIN: ICD-10-CM

## 2017-07-26 PROCEDURE — 99213 OFFICE O/P EST LOW 20 MIN: CPT | Performed by: PHYSICIAN ASSISTANT

## 2017-07-26 RX ORDER — ACETAMINOPHEN 325 MG/1
650 TABLET ORAL EVERY 4 HOURS PRN
Qty: 100 TABLET | Refills: 11 | Status: SHIPPED | OUTPATIENT
Start: 2017-07-26 | End: 2019-07-29

## 2017-07-26 RX ORDER — BISACODYL 10 MG
10 SUPPOSITORY, RECTAL RECTAL DAILY PRN
Qty: 30 SUPPOSITORY | Refills: 11 | Status: SHIPPED | OUTPATIENT
Start: 2017-07-26 | End: 2018-02-19

## 2017-07-26 NOTE — LETTER
Gloria Bang  [address]  [facility] [address]        July 26, 2017           To Whom It May Concern:    Gloria Bang is a patient at our clinic. Patient is wheelchair-bound due to paraplegia. This is a chronic, lifelong condition. Patient requires handicap accessible housing as soon as possible.        Sincerely,        Gina Eli PA-C

## 2017-07-26 NOTE — NURSING NOTE
"Chief Complaint   Patient presents with     Recheck Medication     /89  Pulse 76  Temp 99.6  F (37.6  C) (Oral)  Resp 18  Ht 5' 5\" (1.651 m)  Wt 161 lb (73 kg)  LMP  (LMP Unknown)  SpO2 96%  Breastfeeding? No  BMI 26.79 kg/m2 Estimated body mass index is 26.79 kg/(m^2) as calculated from the following:    Height as of this encounter: 5' 5\" (1.651 m).    Weight as of this encounter: 161 lb (73 kg).  BP completed using cuff size: regular   Tessie Killian CMA    Health Maintenance Due   Topic Date Due     PAP Q3 YR  08/15/2016     Health Maintenance reviewed at today's visit patient asked to schedule/complete:   None, Health Maintenance up to date.    "
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
## 2017-07-26 NOTE — MR AVS SNAPSHOT
After Visit Summary   7/26/2017    Gloria Bang    MRN: [id number]           Patient Information     Date Of Birth          1985        Visit Information        Provider Department      7/26/2017 2:30 PM Gina Eli PA-C; [employer] [facility]        Today's Diagnoses     Muscle spasticity    -  1    Chronic left shoulder pain           Follow-ups after your visit        Additional Services     [facility] PT, HAND, AND CHIROPRACTIC REFERRAL       **This order will print in the [facility] Scheduling Office**    Physical Therapy, Hand Therapy and Chiropractic Care are available through:    *[facility]  *[facility]  *[facility]    Call one number to schedule at any of the above locations: [phone].    Your provider has referred you to: Physical Therapy at [facility] or [facility] [place] [place]    Indication/Reason for Referral: Shoulder Pain  Onset of Illness: >10 years, worse past month  Therapy Orders: Evaluate and Treat  Special Programs: None  Special Request: None    Omar Nelson      Additional Comments for the Therapist or Chiropractor: none    Please be aware that coverage of these services is subject to the terms and limitations of your health insurance plan.  Call member services at your health plan with any benefit or coverage questions.      Please bring the following to your appointment:    *Your personal calendar for scheduling future appointments  *Comfortable clothing                  Your next 10 appointments already scheduled     Aug 23, 2017  8:40 AM CDT   (Arrive by 8:25 AM)   Return Botox with Elio Dejesus MD   [facility] ([facility])    [address] [place] [phone]            Nov 15, 2017  8:40 AM CST   (Arrive by 8:25 AM)   Return Botox with Elio Dejesus MD   [facility]  "[facility] Rehabilitation ([facility] [place])    [address]  [address] [facility] [address] [phone]              Who to contact     If you have questions or need follow up information about today's clinic visit or your schedule please contact [facility] directly at [phone].  Normal or non-critical lab and imaging results will be communicated to you by MyChart, letter or phone within 4 business days after the clinic has received the results. If you do not hear from us within 7 days, please contact the clinic through [employer] or phone. If you have a critical or abnormal lab result, we will notify you by phone as soon as possible.  Submit refill requests through [employer] or call your pharmacy and they will forward the refill request to us. Please allow 3 business days for your refill to be completed.          Additional Information About Your Visit        My[facility]OVE[facility]LM Information     [employer] lets you send messages to your doctor, view your test results, renew your prescriptions, schedule appointments and more. To sign up, go to [email or web address] . Click on \"Log in\" on the left side of the screen, which will take you to the Welcome page. Then click on \"Sign up Now\" on the right side of the page.     You will be asked to enter the access code listed below, as well as some personal information. Please follow the directions to create your username and password.     Your access code is: [email or web address]  Expires: 2017  6:30 AM     Your access code will  in 90 days. If you need help or a new code, please call your [facility] or [phone].        Care EveryWhere ID     This is your Care EveryWhere ID. This could be used by other organizations to access your [facility] medical records  [id number]        Your Vitals Were     Pulse Temperature Respirations Height Last Period Pulse Oximetry    76 99.6  F (37.6  C) (Oral) 18 5' 5\" (1.651 m) " (LMP Unknown) 96%    Breastfeeding? BMI (Body Mass Index)                No 26.79 kg/m2           Blood Pressure from Last 3 Encounters:   07/26/17 134/89   06/02/17 136/70   05/24/17 163/68    Weight from Last 3 Encounters:   07/26/17 161 lb (73 kg)   06/02/17 161 lb (73 kg)   09/28/16 161 lb (73 kg)              We Performed the Following     [facility] PT, HAND, AND CHIROPRACTIC REFERRAL        Primary Care Provider Office Phone # Fax #    Gina Eli PA-C [phone]       [facility] [address]  [facility] [address]        Equal Access to Services     HERNANDEZ OLEARY : Hadii dariela shanks hadasho Soomaali, waaxda luqadaha, qaybta kaalmada adeegyada, nadiya ahmadi. So [facility] [phone].    ATENCIÓN: Si habla español, tiene a kan disposición servicios gratuitos de asistencia lingüística. Llame al [phone].    We comply with applicable federal civil rights laws and [place] laws. We do not discriminate on the basis of race, color, national origin, age, disability sex, sexual orientation or gender identity.            Thank you!     Thank you for choosing [facility]  for your care. Our goal is always to provide you with excellent care. Hearing back from our patients is one way we can continue to improve our services. Please take a few minutes to complete the written survey that you may receive in the mail after your visit with us. Thank you!             Your Updated Medication List - Protect others around you: Learn how to safely use, store and throw away your medicines at www.disposemymeds.org.          This list is accurate as of: 7/26/17  3:07 PM.  Always use your most recent med list.                   Brand Name Dispense Instructions for use Diagnosis    KASSANDRA REID GREASELESS cream     113 g    Apply topically every 6 hours as needed    Chronic pain of both shoulders       bisacodyl 10 MG Suppository    DULCOLAX     30 suppository    Place 1 suppository (10 mg) rectally daily as needed    Other constipation       botulinum toxin type A 100 UNITS injection    BOTOX    400 Units    Inject 400 Units into the muscle every 3 months    Other muscle spasm       capsaicin 0.025 % Crea cream    ZOSTRIX    60 g    Apply 1 g topically 3 times daily as needed    Muscle spasticity       chlorhexidine 0.12 % solution    PERIDEX          * cholecalciferol 1000 UNIT tablet    vitamin D          * vitamin D 2000 UNITS tablet     90 tablet    Take 2,000 Units by mouth daily    Vitamin D deficiency disease       fluticasone 50 MCG/ACT spray    FLONASE    3 Bottle    Spray 1 spray into both nostrils 2 times daily    Chronic rhinitis       labetalol 100 MG tablet    NORMODYNE    90 tablet    Take 1 tablet (100 mg) by mouth daily    Essential hypertension       levothyroxine 100 MCG tablet    SYNTHROID/LEVOTHROID    90 tablet    Take 1 tablet (100 mcg) by mouth daily    Acquired hypothyroidism       order for DME     1 Device    Equipment being ordered: electric williams lift that can be operated by one person.Patient's Height is 5 feet 9 inches; weight as of 8/15/13 is 160 pounds    Paraplegia (H)       * order for DME     1 Box    Equipment being ordered: gauze 4*4 pack of 100, refill prn    Surgical wound, non healing, sequela       * order for DME     100 Units    Equipment being ordered: 4*4 gauzes for dressing change    Open wound       * order for DME     1 each    BP cuff, brand as covered by insurance.  Dx: HTN    Elevated blood pressure       * order for DME     1 each    BP cuff, brand as covered by insurance.  Dx: HTN    Essential hypertension       * order for DME     90 Units    Equipment being ordered:  adult diapers for urinary incontinence - Fax to [employer] [phone]    Paraplegia (H), Continuous leakage       * order for DME     120 pad    Equipment being ordered: Chux    Paraplegia (H), Continuous leakage        Ovulation Predictor Kit     1 kit    Use daily Day 7 and 8 of cycle    Fertility testing       tiZANidine 2 MG tablet    ZANAFLEX    90 tablet    Take 1 tablet (2 mg) by mouth 3 times daily    Muscle spasm       * Notice:  This list has 8 medication(s) that are the same as other medications prescribed for you. Read the directions carefully, and ask your doctor or other care provider to review them with you.

## 2017-08-01 ENCOUNTER — THERAPY VISIT (OUTPATIENT)
Dept: PHYSICAL THERAPY | Facility: CLINIC | Age: 32
End: 2017-08-01
Payer: COMMERCIAL

## 2017-08-01 DIAGNOSIS — M75.42 IMPINGEMENT SYNDROME OF LEFT SHOULDER: Primary | ICD-10-CM

## 2017-08-01 PROCEDURE — 97162 PT EVAL MOD COMPLEX 30 MIN: CPT | Mod: GP | Performed by: PHYSICAL THERAPIST

## 2017-08-01 PROCEDURE — 97530 THERAPEUTIC ACTIVITIES: CPT | Mod: GP | Performed by: PHYSICAL THERAPIST

## 2017-08-01 PROCEDURE — 97110 THERAPEUTIC EXERCISES: CPT | Mod: GP | Performed by: PHYSICAL THERAPIST

## 2017-08-01 PROCEDURE — T1013 SIGN LANG/ORAL INTERPRETER: HCPCS | Mod: U3 | Performed by: PHYSICAL THERAPIST

## 2017-08-01 NOTE — PROGRESS NOTES
Subjective:  Physical Therapy Initial Examination/Evaluation    August 1, 2017    Gloria Bang  is a 32 year old  female referred to physical therapy by KHRIS Rodriguez for treatment of L shoulder.      DOI/onset MD order 7/26/2017  Mechanism of injury 1 month ago.  No specific injury.  When the AC is on, the shoulder will bother me more.    Prior treatment ice. Effect of prior treatment increased pain.  In the past pt has received PT; exercise and massage which she felt very helpful.      Chief Complaint:   Pain under my shoulder, through the arm, into the hand, burning sensation.  The arm feels weak when I attempt to grab something.  When I used to grab something, container of water, I can no longer do it.       Quality: Burning  Constant/Intermittent: constant  Time of day: evening  Symptoms have worsened since onset.    Current pain 5/10.  Pain at worst 8-9/10.    Symptoms aggravated by sitting for prolonged periods, reaching, carrying.    Symptoms improved with stretching.     Social history:  Pt lives with her sister.  Pt is not , no children.  Pt enjoys moving around the house.  Enjoys writing, reading, listening to music.  Sister helps to provide care.        Occupation: Disabled.  Job duties:  Home and self cares.      Patient having difficulty with ADLs: transfers, mobility.    Patient's goals are improve pain.    Patient reports general health as good.  Related medical history thyroid problems, weakness, .    Surgical History:  R LE surgical intervention to improve mobility, back surgery.    Imaging: see note.    Medications:  thyroid.       Outcome measure:   SPADI    Clinical Impression: Gloria presents to [facility] [place] with primary complaint of left shoulder pain.  Pt is right arm dominant; however completes primary transfers with left arm- sliding board, etc.  Per clinical examination, pt demonstrates symptom consistent with L glenohumeral impingement.         HPI                    Objective:  Screen:   (-) cervical   Pain with right rotation.  Tightness through left upper quadrant.      System                   Shoulder Evaluation:  ROM:  AROM:  normal (early elevation L UE, gross inferior capsule deficits, WFL)                            PROM:  normal                                    Special Tests:    Left shoulder positive for the following special tests:  Impingement                                     Formal strength not assessed due to pain.  Education on GHJ impingement.  Isometrics, AAROM.        General     ROS    Assessment/Plan:      Patient is a 32 year old female with left side shoulder complaints.    Patient has the following significant findings with corresponding treatment plan.                Diagnosis 1:  L shoulder pain    Pain -  hot/cold therapy, US, manual therapy, self management, education and home program  Decreased ROM/flexibility - manual therapy, therapeutic exercise and home program  Decreased joint mobility - manual therapy and therapeutic exercise  Decreased strength - therapeutic exercise and therapeutic activities  Impaired balance - neuro re-education and therapeutic activities  Impaired gait - gait training  Impaired muscle performance - neuro re-education  Decreased function - therapeutic activities  Impaired posture - neuro re-education    Therapy Evaluation Codes:   1) History comprised of:   Personal factors that impact the plan of care:      Living environment, Overall behavior pattern, Past/current experiences and mobility factors.    Comorbidity factors that impact the plan of care are:      Weakness and thyroid problems.     Medications impacting care: thyroid.  2) Examination of Body Systems comprised of:   Body structures and functions that impact the plan of care:      Shoulder.   Activity limitations that impact the plan of care are:      Lifting, Sleeping, Laying down and transfers.  3) Clinical presentation  characteristics are:   Evolving/Changing.  4) Decision-Making    Moderate complexity using standardized patient assessment instrument and/or measureable assessment of functional outcome.  Cumulative Therapy Evaluation is: Moderate complexity.    Previous and current functional limitations:  (See Goal Flow Sheet for this information)    Short term and Long term goals: (See Goal Flow Sheet for this information)     Communication ability:  Patient appears to be able to clearly communicate and understand verbal and written communication and follow directions correctly.  Treatment Explanation - The following has been discussed with the patient:   RX ordered/plan of care  Anticipated outcomes  Possible risks and side effects  This patient would benefit from PT intervention to resume normal activities.   Rehab potential is good.    Frequency:  1 X week, once daily  Duration:  for 8 weeks  Discharge Plan:  Achieve all LTG.  Independent in home treatment program.  Reach maximal therapeutic benefit.    Please refer to the daily flowsheet for treatment today, total treatment time and time spent performing 1:1 timed codes.

## 2017-08-01 NOTE — MR AVS SNAPSHOT
After Visit Summary   8/1/2017    Gloria Bang    MRN: [id number]           Patient Information     Date Of Birth          1985        Visit Information        Provider Department      8/1/2017 3:55 PM Liz Pelayo; Nancy Swan, PT [facility]        Today's Diagnoses     Impingement syndrome of left shoulder    -  1       Follow-ups after your visit        Your next 10 appointments already scheduled     Aug 08, 2017 11:25 AM CDT   NUPUR Extremity with India Aldrich PT   [facility] ([facility])    [address] [place] [id number]   [phone]            Aug 23, 2017  8:40 AM CDT   (Arrive by 8:25 AM)   Return Botox with Elio Dejesus MD   [facility] ([facility])    [address] [place] [id number] [phone]            Nov 15, 2017  8:40 AM CST   (Arrive by 8:25 AM)   Return Botox with Elio Dejesus MD   [facility] ([facility])    [address] [place] [id number] [phone]              Who to contact     If you have questions or need follow up information about today's clinic visit or your schedule please contact [facility] directly at [phone].  Normal or non-critical lab and imaging results will be communicated to you by MyChart, letter or phone within 4 business days after the clinic has received the results. If you do not hear from us within 7 days, please contact the clinic through MyChart or phone. If you have a critical or abnormal lab result, we will notify you by phone as soon as possible.  Submit refill requests through [employer] or call your pharmacy and they will forward the refill request to us. Please allow 3 business days for your refill to  "be completed.          Additional Information About Your Visit        [employer] Information     [employer] lets you send messages to your doctor, view your test results, renew your prescriptions, schedule appointments and more. To sign up, go to [email or web address] . Click on \"Log in\" on the left side of the screen, which will take you to the Welcome page. Then click on \"Sign up Now\" on the right side of the page.     You will be asked to enter the access code listed below, as well as some personal information. Please follow the directions to create your username and password.     Your access code is: [email or web address]  Expires: 2017  6:30 AM     Your access code will  in 90 days. If you need help or a new code, please call your [facility] clinic or [phone].        Care EveryWhere ID     This is your Care EveryWhere ID. This could be used by other organizations to access your [facility] medical records  [id number]        Your Vitals Were     Last Period                   (LMP Unknown)            Blood Pressure from Last 3 Encounters:   17 134/89   17 136/70   17 163/68    Weight from Last 3 Encounters:   17 73 kg (161 lb)   17 73 kg (161 lb)   16 73 kg (161 lb)              We Performed the Following     NUPUR Inital Eval Report     PT Eval, Moderate Complexity (12963)     Therapeutic Activities     Therapeutic Exercises        Primary Care Provider Office Phone # Fax #    Gina Eli PA-C [phone]       [facility] [address] [facility] [address]  [facility] [address]        Equal Access to Services     HERNANDEZ OLEARY : Hadii dariela Yun, waaxda luqkrissy, qaybta kaalnadiya duval. So [facility] [phone].    ATENCIÓN: Si habla español, tiene a kan disposición servicios gratuitos de asistencia lingüística. Veronikaame al [phone].    We comply with applicable federal civil rights laws and " [place] laws. We do not discriminate on the basis of race, color, national origin, age, disability sex, sexual orientation or gender identity.            Thank you!     Thank you for choosing [facility] [place]  for your care. Our goal is always to provide you with excellent care. Hearing back from our patients is one way we can continue to improve our services. Please take a few minutes to complete the written survey that you may receive in the mail after your visit with us. Thank you!             Your Updated Medication List - Protect others around you: Learn how to safely use, store and throw away your medicines at www.disposemymeds.org.          This list is accurate as of: 8/1/17 11:59 PM.  Always use your most recent med list.                   Brand Name Dispense Instructions for use Diagnosis    acetaminophen 325 MG tablet    TYLENOL    100 tablet    Take 2 tablets (650 mg) by mouth every 4 hours as needed for mild pain    Muscle spasticity       KASSANDRA REID GREASELESS cream     113 g    Apply topically every 6 hours as needed    Chronic pain of both shoulders       bisacodyl 10 MG Suppository    DULCOLAX    30 suppository    Place 1 suppository (10 mg) rectally daily as needed    Other constipation       botulinum toxin type A 100 UNITS injection    BOTOX    400 Units    Inject 400 Units into the muscle every 3 months    Other muscle spasm       capsaicin 0.025 % Crea cream    ZOSTRIX    60 g    Apply 1 g topically 3 times daily as needed    Muscle spasticity       chlorhexidine 0.12 % solution    PERIDEX          * cholecalciferol 1000 UNIT tablet    vitamin D          * vitamin D 2000 UNITS tablet     90 tablet    Take 2,000 Units by mouth daily    Vitamin D deficiency disease       fluticasone 50 MCG/ACT spray    FLONASE    3 Bottle    Spray 1 spray into both nostrils 2 times daily    Chronic rhinitis       labetalol 100 MG tablet    NORMODYNE    90 tablet    Take 1 tablet (100  mg) by mouth daily    Essential hypertension       levothyroxine 100 MCG tablet    SYNTHROID/LEVOTHROID    90 tablet    Take 1 tablet (100 mcg) by mouth daily    Acquired hypothyroidism       order for DME     1 Device    Equipment being ordered: electric williams lift that can be operated by one person.Patient's Height is 5 feet 9 inches; weight as of 8/15/13 is 160 pounds    Paraplegia (H)       * order for DME     1 Box    Equipment being ordered: gauze 4*4 pack of 100, refill prn    Surgical wound, non healing, sequela       * order for DME     100 Units    Equipment being ordered: 4*4 gauzes for dressing change    Open wound       * order for DME     1 each    BP cuff, brand as covered by insurance.  Dx: HTN    Elevated blood pressure       * order for DME     1 each    BP cuff, brand as covered by insurance.  Dx: HTN    Essential hypertension       * order for DME     90 Units    Equipment being ordered:  adult diapers for urinary incontinence - Fax to [employer] [phone]    Paraplegia (H), Continuous leakage       * order for DME     120 pad    Equipment being ordered: Chux    Paraplegia (H), Continuous leakage       Ovulation Predictor Kit     1 kit    Use daily Day 7 and 8 of cycle    Fertility testing       tiZANidine 2 MG tablet    ZANAFLEX    90 tablet    Take 1 tablet (2 mg) by mouth 3 times daily    Muscle spasm       * Notice:  This list has 8 medication(s) that are the same as other medications prescribed for you. Read the directions carefully, and ask your doctor or other care provider to review them with you.

## 2017-08-06 PROBLEM — M75.42 IMPINGEMENT SYNDROME OF LEFT SHOULDER: Status: ACTIVE | Noted: 2017-08-06

## 2017-08-08 ENCOUNTER — THERAPY VISIT (OUTPATIENT)
Dept: PHYSICAL THERAPY | Facility: CLINIC | Age: 32
End: 2017-08-08
Payer: COMMERCIAL

## 2017-08-08 DIAGNOSIS — M75.42 IMPINGEMENT SYNDROME OF LEFT SHOULDER: Primary | ICD-10-CM

## 2017-08-08 PROCEDURE — 97110 THERAPEUTIC EXERCISES: CPT | Mod: GP | Performed by: PHYSICAL THERAPIST

## 2017-08-08 PROCEDURE — 97112 NEUROMUSCULAR REEDUCATION: CPT | Mod: GP | Performed by: PHYSICAL THERAPIST

## 2017-08-08 PROCEDURE — 97140 MANUAL THERAPY 1/> REGIONS: CPT | Mod: GP | Performed by: PHYSICAL THERAPIST

## 2017-08-23 ENCOUNTER — OFFICE VISIT (OUTPATIENT)
Dept: PHYSICAL MEDICINE AND REHAB | Facility: CLINIC | Age: 32
End: 2017-08-23

## 2017-08-23 VITALS
BODY MASS INDEX: 26.81 KG/M2 | DIASTOLIC BLOOD PRESSURE: 69 MMHG | SYSTOLIC BLOOD PRESSURE: 146 MMHG | TEMPERATURE: 98 F | HEIGHT: 65 IN | HEART RATE: 77 BPM | WEIGHT: 160.94 LBS

## 2017-08-23 DIAGNOSIS — G82.20 PARAPLEGIA (H): ICD-10-CM

## 2017-08-23 DIAGNOSIS — M62.838 MUSCLE SPASTICITY: Primary | ICD-10-CM

## 2017-08-23 ASSESSMENT — PAIN SCALES - GENERAL: PAINLEVEL: NO PAIN (0)

## 2017-08-23 NOTE — LETTER
"8/23/2017       RE: Gloria Bang  [address] [place] [address]  [place] [address]     Dear Colleague,    Thank you for referring your patient, Gloria Bang, to the [facility] REH[facility]ATION at [facility]. Please see a copy of my visit note below.    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 We had a longer consultation in May with further discussion. She returns accompanied by 3 family members as well as [facility] . Due to English as a second language and different perceptions of Western medicine, understanding of anatomy and function, we again in-depth 1 through targeted effects goals and possible side effects at length today. Ultimately she does have significant spasms in both legs, some of the most problematic are the knee flexion and hip adduction. She also has knee extension synergy patterns at times. There is considerable ankle and foot plantarflexion and inversion of these I suspect are longer standing fixed contractures and would not be amenable to chemodenervation below-the-knee.    Physical exam:  /69 (BP Location: Right arm, Patient Position: Sitting, Cuff Size: Adult Large)  Pulse 77  Temp 98  F (36.7  C) (Oral)  Ht 5' 5\" (1.651 m)  Wt 160 lb 15 oz (73 kg)  LMP  (LMP Unknown)  BMI 26.78 kg/m2   Gloria is alert pleasant no diaphoresis. We is a small  for communication. She rises with a power wheelchair with right side joystick control and tilt capacity.  Involuntary spasms are noted in her bilateral lower extremities. These exacerbate with attempted passive range of motion. Different patterns including flexion and extension synergy are noted.  Modified Lorin 3/4 in both legs above the knee, 4/4 versus fixed contracture in the bilateral ankles and " toes with plantar flexion inversion positioning.  Overlying skin in the thighs hamstrings is healthy.    Assessment and recommendations:  1. Gloria has severe spasticity in her lower extremities related to her spinal cord injury. This has been refractory to oral medication management. She is very appropriate for chemodenervation to bilateral lower extremities.  2. She likely will benefit from 400 units though given this is her first time, we'll start with 300 units divided evenly between both legs. Target muscles with hamstrings and adductors.  3. She should continue with positioning and stretching exercises.  4. Follow-up in 3 months time.    30 minutes spent in direct patient interaction. Of that greater than 50% counseling and education as above. This is independent of the below procedure time.    Procedure: Chemodenervation to bilateral lower extremities utilizing onabotulinum toxin type A, Botox. I reviewed at length the risks benefits and potential time course for onset and duration. She agrees and signed the consent form today as does the . 3 family members present also in agreement as well. Total therapeutic units of Botox lot -E1 expiration 2020 was utilized. NDC #2788-3269-25. Diluted with preservative-free normal saline ratio of 100 units per milliliter. This was divided into 2 syringes. 27-gauge recording EMG needle was utilized in conjunction with EMG guidance to identify the most active motor units while avoiding surrounding structures. Transferred to exam table and appropriately ground to access target areas. Additional assistance needed to hold legs because of involuntary spasm. All areas were cleansed with ChloraPrep. The following muscles were then injected  Left le. Adductor muscle group, 75 units divided over 3 sites  2. Hamstring, 75 units divided over 3 sites    Right le. Adductor muscle group, 75 units divided over 3 sites  2. Hamstring, 75 units divided over 3  sites.    Besides some involuntary spasms that occur during the needle procedure, she tolerated very well. After seeing some of the synergy patterns, not as much adduction was observed and we may shift some of that dose towards the hamstring or even consider quadricep and subsequent injections. As above I do feel she would benefit from 400 units but wanted to start low her first time to assure her response.    Again, thank you for allowing me to participate in the care of your patient.      Sincerely,    Elio Dejesus MD

## 2017-08-23 NOTE — MR AVS SNAPSHOT
After Visit Summary   8/23/2017    Gloria Bang    MRN: [id number]           Patient Information     Date Of Birth          1985        Visit Information        Provider Department      8/23/2017 8:25 AM Elio Dejesus MD; [employer] [facility]         Follow-ups after your visit        Your next 10 appointments already scheduled     Nov 15, 2017  8:40 AM CST   (Arrive by 8:25 AM)   Return Botox with Elio Dejesus MD   [facility] ([facility])    [address] [place] [id number] [phone]            Jan 31, 2018  9:20 AM CST   (Arrive by 9:05 AM)   Return Botox with Elio Dejesus MD   [facility] ([facility])    [address] [place] [id number] [phone]              Who to contact     Please call your clinic at [phone] to:    Ask questions about your health    Make or cancel appointments    Discuss your medicines    Learn about your test results    Speak to your doctor   If you have compliments or concerns about an experience at your clinic, or if you wish to file a complaint, please contact [facility] Patient Relations at [phone] or email us at [email or web address]         Additional Information About Your Visit        MyChart Information     [employer] is an electronic gateway that provides easy, online access to your medical records. With [employer], you can request a clinic appointment, read your test results, renew a prescription or communicate with your care team.     To sign up for [employer] visit the website at [email or web address]   You will be asked to enter the access code listed below, as well as some personal information. Please follow the directions to create your username and password.     Your  "access code is: [email or web address]  Expires: 2017  6:30 AM     Your access code will  in 90 days. If you need help or a new code, please contact your [facility] or call [phone] for assistance.        Care EveryWhere ID     This is your Care EveryWhere ID. This could be used by other organizations to access your [facility] medical records  [id number]        Your Vitals Were     Pulse Temperature Height Last Period BMI (Body Mass Index)       77 98  F (36.7  C) (Oral) 1.651 m (5' 5\") (LMP Unknown) 26.78 kg/m2        Blood Pressure from Last 3 Encounters:   17 146/69   17 134/89   17 136/70    Weight from Last 3 Encounters:   17 73 kg (160 lb 15 oz)   17 73 kg (161 lb)   17 73 kg (161 lb)              Today, you had the following     No orders found for display       Primary Care Provider Office Phone # Fax #    Gina Eli PA-C [phone]       [address] [facility] [address]  [place] [address]        Equal Access to Services     HERNANDEZ OLEARY : Hadii dariela amaroo Sosesarali, waaxda luqadaha, qaybta kaalmada adeegyada, nadiya blackwell . So [facility] [phone].    ATENCIÓN: Si habla español, tiene a kan disposición servicios gratuitos de asistencia lingüística. Carmina al [phone].    We comply with applicable federal civil rights laws and [place] laws. We do not discriminate on the basis of race, color, national origin, age, disability sex, sexual orientation or gender identity.            Thank you!     Thank you for choosing [facility]  for your care. Our goal is always to provide you with excellent care. Hearing back from our patients is one way we can continue to improve our services. Please take a few minutes to complete the written survey that you may receive in the mail after your visit with us. Thank you!             Your Updated Medication List - Protect " others around you: Learn how to safely use, store and throw away your medicines at www.disposemymeds.org.          This list is accurate as of: 8/23/17  9:44 AM.  Always use your most recent med list.                   Brand Name Dispense Instructions for use Diagnosis    acetaminophen 325 MG tablet    TYLENOL    100 tablet    Take 2 tablets (650 mg) by mouth every 4 hours as needed for mild pain    Muscle spasticity       KASSANDRA REID GREASELESS cream     113 g    Apply topically every 6 hours as needed    Chronic pain of both shoulders       bisacodyl 10 MG Suppository    DULCOLAX    30 suppository    Place 1 suppository (10 mg) rectally daily as needed    Other constipation       botulinum toxin type A 100 UNITS injection    BOTOX    400 Units    Inject 400 Units into the muscle every 3 months    Other muscle spasm       capsaicin 0.025 % Crea cream    ZOSTRIX    60 g    Apply 1 g topically 3 times daily as needed    Muscle spasticity       chlorhexidine 0.12 % solution    PERIDEX          * cholecalciferol 1000 UNIT tablet    vitamin D          * vitamin D 2000 UNITS tablet     90 tablet    Take 2,000 Units by mouth daily    Vitamin D deficiency disease       fluticasone 50 MCG/ACT spray    FLONASE    3 Bottle    Spray 1 spray into both nostrils 2 times daily    Chronic rhinitis       labetalol 100 MG tablet    NORMODYNE    90 tablet    Take 1 tablet (100 mg) by mouth daily    Essential hypertension       levothyroxine 100 MCG tablet    SYNTHROID/LEVOTHROID    90 tablet    Take 1 tablet (100 mcg) by mouth daily    Acquired hypothyroidism       order for DME     1 Device    Equipment being ordered: electric williams lift that can be operated by one person.Patient's Height is 5 feet 9 inches; weight as of 8/15/13 is 160 pounds    Paraplegia (H)       * order for DME     1 Box    Equipment being ordered: gauze 4*4 pack of 100, refill prn    Surgical wound, non healing, sequela       * order for DME     100 Units     Equipment being ordered: 4*4 gauzes for dressing change    Open wound       * order for DME     1 each    BP cuff, brand as covered by insurance.  Dx: HTN    Elevated blood pressure       * order for DME     1 each    BP cuff, brand as covered by insurance.  Dx: HTN    Essential hypertension       * order for DME     90 Units    Equipment being ordered:  adult diapers for urinary incontinence - Fax to [employer] [phone]    Paraplegia (H), Continuous leakage       * order for DME     120 pad    Equipment being ordered: Chux    Paraplegia (H), Continuous leakage       Ovulation Predictor Kit     1 kit    Use daily Day 7 and 8 of cycle    Fertility testing       tiZANidine 2 MG tablet    ZANAFLEX    90 tablet    Take 1 tablet (2 mg) by mouth 3 times daily    Muscle spasm       * Notice:  This list has 8 medication(s) that are the same as other medications prescribed for you. Read the directions carefully, and ask your doctor or other care provider to review them with you.

## 2017-08-23 NOTE — PROGRESS NOTES
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 We had a longer consultation in May with further discussion. She returns accompanied by 3 family members as well as [facility] . Due to English as a second language and different perceptions of Western medicine, understanding of anatomy and function, we again in-depth 1 through targeted effects goals and possible side effects at length today. Ultimately she does have significant spasms in both legs, some of the most problematic are the knee flexion and hip adduction. She also has knee extension synergy patterns at times. There is considerable ankle and foot plantarflexion and inversion of these I suspect are longer standing fixed contractures and would not be amenable to chemodenervation below-the-knee.    Physical exam:  /69 (BP Location: Right arm, Patient Position: Sitting, Cuff Size: Adult Large)  Pulse 77  Temp 98  F (36.7  C) (Oral)  Ht 5' 5\" (1.651 m)  Wt 160 lb 15 oz (73 kg)  LMP  (LMP Unknown)  BMI 26.78 kg/m2   Gloria is alert pleasant no diaphoresis. We is a small  for communication. She rises with a power wheelchair with right side joystick control and tilt capacity.  Involuntary spasms are noted in her bilateral lower extremities. These exacerbate with attempted passive range of motion. Different patterns including flexion and extension synergy are noted.  Modified Lorin 3/4 in both legs above the knee, 4/4 versus fixed contracture in the bilateral ankles and toes with plantar flexion inversion positioning.  Overlying skin in the thighs hamstrings is healthy.    Assessment and recommendations:  1. Gloria has severe spasticity in her lower extremities related to her spinal cord injury. This has been refractory to oral medication management. She is very appropriate for " chemodenervation to bilateral lower extremities.  2. She likely will benefit from 400 units though given this is her first time, we'll start with 300 units divided evenly between both legs. Target muscles with hamstrings and adductors.  3. She should continue with positioning and stretching exercises.  4. Follow-up in 3 months time.    30 minutes spent in direct patient interaction. Of that greater than 50% counseling and education as above. This is independent of the below procedure time.    Procedure: Chemodenervation to bilateral lower extremities utilizing onabotulinum toxin type A, Botox. I reviewed at length the risks benefits and potential time course for onset and duration. She agrees and signed the consent form today as does the . 3 family members present also in agreement as well. Total therapeutic units of Botox lot -W9 expiration 2020 was utilized. NDC #1741-2038-37. Diluted with preservative-free normal saline ratio of 100 units per milliliter. This was divided into 2 syringes. 27-gauge recording EMG needle was utilized in conjunction with EMG guidance to identify the most active motor units while avoiding surrounding structures. Transferred to exam table and appropriately ground to access target areas. Additional assistance needed to hold legs because of involuntary spasm. All areas were cleansed with ChloraPrep. The following muscles were then injected  Left le. Adductor muscle group, 75 units divided over 3 sites  2. Hamstring, 75 units divided over 3 sites    Right le. Adductor muscle group, 75 units divided over 3 sites  2. Hamstring, 75 units divided over 3 sites.    Besides some involuntary spasms that occur during the needle procedure, she tolerated very well. After seeing some of the synergy patterns, not as much adduction was observed and we may shift some of that dose towards the hamstring or even consider quadricep and subsequent injections. As above I do feel  she would benefit from 400 units but wanted to start low her first time to assure her response.

## 2017-09-10 ENCOUNTER — HEALTH MAINTENANCE LETTER (OUTPATIENT)
Age: 32
End: 2017-09-10

## 2017-11-15 ENCOUNTER — OFFICE VISIT (OUTPATIENT)
Dept: PHYSICAL MEDICINE AND REHAB | Facility: CLINIC | Age: 32
End: 2017-11-15

## 2017-11-15 VITALS — DIASTOLIC BLOOD PRESSURE: 84 MMHG | HEART RATE: 74 BPM | SYSTOLIC BLOOD PRESSURE: 173 MMHG | HEIGHT: 65 IN

## 2017-11-15 DIAGNOSIS — M62.838 MUSCLE SPASTICITY: Primary | ICD-10-CM

## 2017-11-15 DIAGNOSIS — G89.29 CHRONIC LEFT SHOULDER PAIN: ICD-10-CM

## 2017-11-15 DIAGNOSIS — G82.20 PARAPLEGIA (H): ICD-10-CM

## 2017-11-15 DIAGNOSIS — M25.512 CHRONIC LEFT SHOULDER PAIN: ICD-10-CM

## 2017-11-15 ASSESSMENT — PAIN SCALES - GENERAL: PAINLEVEL: NO PAIN (0)

## 2017-11-15 NOTE — NURSING NOTE
Chief Complaint   Patient presents with     RECHECK     12 week follow up botox 300 units bilateral legs    Zenaida Valenzuela CMA

## 2017-11-15 NOTE — LETTER
"11/15/2017       RE: Gloria Bang  [address]  [facility] [address]     Dear Colleague,    Thank you for referring your patient, Gloria Bang, to the [facility] PHYSICAL MEDICINE AND REHABILITATION at [facility]. Please see a copy of my visit note below.    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 She is accompanied by her mother and [place]  today. I saw her August 2030 which time he utilized 300 units of Botox divided evenly between the right and left leg adductors and hamstrings. She reports this definitely helped with the spasms that she has in her legs. She denies any side effects from that injection. She believes the benefits started to wear off within the last few days. She is overall feeling healthy and well today with no new interval medical issues.    She does bring up concerns with her left arm and shoulder. Describes it as feeling in all its perceptions of Western I think the likely perfect place intermittently. Not completely clear alleviating or exacerbating factors but she does feel adding warm water or even hairdryer will help feel better. She reports the physical therapy she did \"last year\" was helpful and she wants new referral for this. As I explored this further sheet most recently done therapy for the shoulder just a few months ago. Pressing on the issue it ultimately seems that she was looking for more of a maintenance program. She is also wondering about some form of hot temperature therapy which again is a bit unclear I speculate if she's meaning sauna or other warm room such as at health club. She's not interested in aquatic therapy.    Physical exam:  /84  Pulse 74  Ht 5' 5\" (1.651 m)   Gloria is alert pleasant no diaphoresis. We is a [place]  " for communication. She arrives with a power wheelchair with right side joystick control and tilt capacity.  Involuntary spasms are noted in her bilateral lower extremities. These exacerbate with attempted passive range of motion. Different patterns including flexion and extension synergy are noted.  Modified Lorin 3/4 in both legs above the knee, 4/4 versus fixed contracture in the bilateral ankles and toes with plantar flexion inversion positioning.  Overlying skin in the thighs hamstrings is healthy.    Assessment and recommendations:  1. Gloria has severe spasticity in her lower extremities related to her spinal cord injury. This has been refractory to oral medication management. She benefited from chemodenervation to bilateral lower extremities 3 months ago and will repeat this today. Will increase the dose to 400 units as we anticipated given bilateral four large muscle distribution. Continue target muscles with hamstrings and adductors.  2. She should continue with positioning and stretching exercises.  3.  reviewed the differences between physical therapy and maintenance program. We'll not send a new referral today. Information given about microwavable heating micro-bead pillows.  4. Follow-up in 3 months time, will call sooner if any side effects or other concerns arise.     20 minutes spent in direct patient interaction. Of that greater than 50% counseling and education as above. This is independent of the below procedure time.    Procedure: Chemodenervation to bilateral lower extremities utilizing onabotulinum toxin type A, Botox. I reviewed at length the risks benefits and potential time course for onset and duration. She agrees and signed the consent form today as does the . Family members present also in agreement as well. Total therapeutic units of Botox lot -Y4 expiration 5/2020 was utilized. NDC #3371-0033-71. Diluted with preservative-free normal saline ratio of 100 units per  milliliter. This was divided into 2 syringes. 27-gauge recording EMG needle was utilized in conjunction with EMG guidance to identify the most active motor units while avoiding surrounding structures. Transferred to exam table and appropriately ground to access target areas. Additional assistance needed to hold legs because of involuntary spasm. All areas were cleansed with ChloraPrep. The following muscles were then injected  Left le. Adductor muscle group, 100 units divided over 3 sites  2. Hamstring, 100 units divided over 3 sites    Right le. Adductor muscle group, 100 units divided over 3 sites  2. Hamstring, 100 units divided over 3 sites.    Besides some involuntary spasms that occur during the needle procedure, she tolerated very well.    Again, thank you for allowing me to participate in the care of your patient.      Sincerely,    Elio Dejesus MD

## 2017-11-15 NOTE — PROGRESS NOTES
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 She is accompanied by her mother and [facility]  today. I saw her August 2030 which time he utilized 300 units of Botox divided evenly between the right and left leg adductors and hamstrings. She reports this definitely helped with the spasms that she has in her legs. She denies any side effects from that injection. She believes the benefits started to wear off within the last few days. She is overall feeling healthy and well today with no new interval medical issues.    She does bring up concerns with her left arm and shoulder. Describes it as feeling in all its perceptions of [place] I think the likely perfect place intermittently. Not completely clear alleviating or exacerbating factors but she does feel adding warm water or even hairdryer will help feel better. She reports the physical therapy she did \"last year\" was helpful and she wants new referral for this. As I explored this further sheet most recently done therapy for the shoulder just a few months ago. Pressing on the issue it ultimately seems that she was looking for more of a maintenance program. She is also wondering about some form of hot temperature therapy which again is a bit unclear I speculate if she's meaning sauna or other warm room such as at health club. She's not interested in aquatic therapy.    Physical exam:  /84  Pulse 74  Ht 5' 5\" (1.651 m)   Gloria is alert pleasant no diaphoresis. We is a [facility]  for communication. She arrives with a power wheelchair with right side joystick control and tilt capacity.  Involuntary spasms are noted in her bilateral lower extremities. These exacerbate with attempted passive range of motion. Different patterns including flexion and extension synergy are noted.  Modified " Lorin 3/4 in both legs above the knee, 4/4 versus fixed contracture in the bilateral ankles and toes with plantar flexion inversion positioning.  Overlying skin in the thighs hamstrings is healthy.    Assessment and recommendations:  1. Gloria has severe spasticity in her lower extremities related to her spinal cord injury. This has been refractory to oral medication management. She benefited from chemodenervation to bilateral lower extremities 3 months ago and will repeat this today. Will increase the dose to 400 units as we anticipated given bilateral four large muscle distribution. Continue target muscles with hamstrings and adductors.  2. She should continue with positioning and stretching exercises.  3.  reviewed the differences between physical therapy and maintenance program. We'll not send a new referral today. Information given about microwavable heating micro-bead pillows.  4. Follow-up in 3 months time, will call sooner if any side effects or other concerns arise.     20 minutes spent in direct patient interaction. Of that greater than 50% counseling and education as above. This is independent of the below procedure time.    Procedure: Chemodenervation to bilateral lower extremities utilizing onabotulinum toxin type A, Botox. I reviewed at length the risks benefits and potential time course for onset and duration. She agrees and signed the consent form today as does the . Family members present also in agreement as well. Total therapeutic units of Botox lot -R9 expiration 5/2020 was utilized. NDC #8654-9830-52. Diluted with preservative-free normal saline ratio of 100 units per milliliter. This was divided into 2 syringes. 27-gauge recording EMG needle was utilized in conjunction with EMG guidance to identify the most active motor units while avoiding surrounding structures. Transferred to exam table and appropriately ground to access target areas. Additional assistance needed to hold  legs because of involuntary spasm. All areas were cleansed with ChloraPrep. The following muscles were then injected  Left le. Adductor muscle group, 100 units divided over 3 sites  2. Hamstring, 100 units divided over 3 sites    Right le. Adductor muscle group, 100 units divided over 3 sites  2. Hamstring, 100 units divided over 3 sites.    Besides some involuntary spasms that occur during the needle procedure, she tolerated very well.

## 2017-11-15 NOTE — MR AVS SNAPSHOT
After Visit Summary   11/15/2017    Gloria Bang    MRN: [id number]           Patient Information     Date Of Birth          1985        Visit Information        Provider Department      11/15/2017 8:25 AM Elio Dejesus MD; [employer] [facility]        Today's Diagnoses     Muscle spasticity    -  1    Paraplegia (H)        Chronic left shoulder pain           Follow-ups after your visit        Follow-up notes from your care team     Return in about 3 months (around 2/15/2018).      Your next 10 appointments already scheduled     Jan 31, 2018  9:20 AM CST   (Arrive by 9:05 AM)   Return Botox with Elio Dejesus MD   [facility] ([facility])    [address] [place] [id number] [phone]            Apr 25, 2018 10:20 AM CDT   (Arrive by 10:05 AM)   Return Botox with Elio Dejesus MD   [facility] ([facility])    [address] [place] [address] [phone]              Who to contact     Please call your clinic at [phone] to:    Ask questions about your health    Make or cancel appointments    Discuss your medicines    Learn about your test results    Speak to your doctor   If you have compliments or concerns about an experience at your clinic, or if you wish to file a complaint, please contact [facility] Patient Relations at [phone] or email us at [email or web address]         Additional Information About Your Visit        MyChart Information     [employer] is an electronic gateway that provides easy, online access to your medical records. With Cozy, you can request a clinic appointment, read your test results, renew a prescription or communicate with your care team.     To sign up for [employer] visit the website at  "[email or web address]   You will be asked to enter the access code listed below, as well as some personal information. Please follow the directions to create your username and password.     Your access code is: [email or web address]  Expires: 2017  5:30 AM     Your access code will  in 90 days. If you need help or a new code, please contact your [facility] or call [phone] for assistance.        Care EveryWhere ID     This is your Care EveryWhere ID. This could be used by other organizations to access your [place] medical records  [id number]        Your Vitals Were     Pulse Height                74 5' 5\" (1.651 m)           Blood Pressure from Last 3 Encounters:   11/15/17 173/84   17 146/69   17 134/89    Weight from Last 3 Encounters:   17 160 lb 15 oz (73 kg)   17 161 lb (73 kg)   17 161 lb (73 kg)              We Performed the Following     HC CHEMODENERVATION 1 EXTREMITY, EA ADDL EXTREMITY, 1-4 MUSCLE     HC CHEMODENERVATION ONE EXTREMITY, 1-4 MUSCLE     NEEDLE EMG GUIDE W CHEMODENERVATION        Primary Care Provider Office Phone # Fax #    Gina Eli PA-C [phone] [address]  [facility] [address]        Equal Access to Services     HERNANDEZ OLEARY : Hadii aad ku hadasho Soomaali, waaxda luqadaha, qaybta kaalmada adeegyada, nadiya blackwell . So [facility] [phone].    ATENCIÓN: Si habla español, tiene a kan disposición servicios gratuitos de asistencia lingüística. Llame al [phone].    We comply with applicable federal civil rights laws and [place] laws. We do not discriminate on the basis of race, color, national origin, age, disability, sex, sexual orientation, or gender identity.            Thank you!     Thank you for choosing [facility] PHYSICAL MEDICINE AND REHABILITATION  for your care. Our goal is always to provide you with excellent care. Hearing back from " our patients is one way we can continue to improve our services. Please take a few minutes to complete the written survey that you may receive in the mail after your visit with us. Thank you!             Your Updated Medication List - Protect others around you: Learn how to safely use, store and throw away your medicines at www.disposemymeds.org.          This list is accurate as of: 11/15/17 10:25 AM.  Always use your most recent med list.                   Brand Name Dispense Instructions for use Diagnosis    acetaminophen 325 MG tablet    TYLENOL    100 tablet    Take 2 tablets (650 mg) by mouth every 4 hours as needed for mild pain    Muscle spasticity       KASSANDRA REID GREASELESS cream     113 g    Apply topically every 6 hours as needed    Chronic pain of both shoulders       bisacodyl 10 MG Suppository    DULCOLAX    30 suppository    Place 1 suppository (10 mg) rectally daily as needed    Other constipation       botulinum toxin type A 100 UNITS injection    BOTOX    400 Units    Inject 400 Units into the muscle every 3 months    Other muscle spasm       capsaicin 0.025 % Crea cream    ZOSTRIX    60 g    Apply 1 g topically 3 times daily as needed    Muscle spasticity       chlorhexidine 0.12 % solution    PERIDEX          * cholecalciferol 1000 UNIT tablet    vitamin D3          * vitamin D 2000 UNITS tablet     90 tablet    Take 2,000 Units by mouth daily    Vitamin D deficiency disease       fluticasone 50 MCG/ACT spray    FLONASE    3 Bottle    Spray 1 spray into both nostrils 2 times daily    Chronic rhinitis       labetalol 100 MG tablet    NORMODYNE    90 tablet    Take 1 tablet (100 mg) by mouth daily    Essential hypertension       levothyroxine 100 MCG tablet    SYNTHROID/LEVOTHROID    90 tablet    Take 1 tablet (100 mcg) by mouth daily    Acquired hypothyroidism       order for DME     1 Device    Equipment being ordered: electric williams lift that can be operated by one person.Patient's Height is 5  feet 9 inches; weight as of 8/15/13 is 160 pounds    Paraplegia (H)       * order for DME     1 Box    Equipment being ordered: gauze 4*4 pack of 100, refill prn    Surgical wound, non healing, sequela       * order for DME     100 Units    Equipment being ordered: 4*4 gauzes for dressing change    Open wound       * order for DME     1 each    BP cuff, brand as covered by insurance.  Dx: HTN    Elevated blood pressure       * order for DME     1 each    BP cuff, brand as covered by insurance.  Dx: HTN    Essential hypertension       * order for DME     90 Units    Equipment being ordered:  adult diapers for urinary incontinence - Fax to [employer] [phone]    Paraplegia (H), Continuous leakage(788.37)       * order for DME     120 pad    Equipment being ordered: Chux    Paraplegia (H), Continuous leakage(788.37)       Ovulation Predictor Kit     1 kit    Use daily Day 7 and 8 of cycle    Fertility testing       tiZANidine 2 MG tablet    ZANAFLEX    90 tablet    Take 1 tablet (2 mg) by mouth 3 times daily    Muscle spasm       * Notice:  This list has 8 medication(s) that are the same as other medications prescribed for you. Read the directions carefully, and ask your doctor or other care provider to review them with you.

## 2018-01-31 ENCOUNTER — OFFICE VISIT (OUTPATIENT)
Dept: PHYSICAL MEDICINE AND REHAB | Facility: CLINIC | Age: 33
End: 2018-01-31

## 2018-01-31 VITALS — HEIGHT: 65 IN | SYSTOLIC BLOOD PRESSURE: 180 MMHG | HEART RATE: 79 BPM | DIASTOLIC BLOOD PRESSURE: 83 MMHG

## 2018-01-31 DIAGNOSIS — M62.838 MUSCLE SPASTICITY: Primary | ICD-10-CM

## 2018-01-31 DIAGNOSIS — G82.20 PARAPLEGIA (H): ICD-10-CM

## 2018-01-31 NOTE — LETTER
"1/31/2018       RE: Gloria Bang  [address]  [facility] [address]     Dear Colleague,    Thank you for referring your patient, Gloria Bang, to the [facility] PHYSICAL MEDICINE AND REHABILITATION at [facility]. Please see a copy of my visit note below.    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 She is accompanied by her mother and [place]  today. I saw her 11/15 which time he utilized 400 units of Botox divided evenly between the right and left leg adductors and hamstrings. She reports this definitely helped with the spasms that she has in her legs. The time prior had used 300 units an she feels the 400 unit was bit more effective. She denies any side effects from that injection. She believes the benefits started to wear off within the last few days. She is overall feeling healthy and well today with no new interval medical issues.    Physical exam:  /83  Pulse 79  Ht 5' 5\" (1.651 m)   Gloria is alert pleasant no diaphoresis. We use a [place]  for communication. She arrives with a power wheelchair with right side joystick control and tilt capacity.  Involuntary spasms are noted in her bilateral lower extremities. These exacerbate with attempted passive range of motion. Different patterns including flexion and extension synergy are noted.  Modified Lorin 3/4 in both legs above the knee, 4/4 versus fixed contracture in the bilateral ankles and toes with plantar flexion inversion positioning.  Overlying skin in the thighs hamstrings is healthy.    Assessment and recommendations:  1. Gloria has severe spasticity in her lower extremities related to her spinal cord injury. This has been refractory to oral medication management. She benefited from chemodenervation to " bilateral lower extremities 3 months ago and will repeat this today. Will increase the dose to 400 units as we anticipated given bilateral four large muscle distribution. Continue target muscles with hamstrings and adductors.  2. She should continue with positioning and stretching exercises.  3.  reviewed the differences between physical therapy and maintenance program. We'll not send a new referral today. Information given about microwavable heating micro-bead pillows.  4. Follow-up in 3 months time, will call sooner if any side effects or other concerns arise.     20 minutes spent in direct patient interaction. Of that greater than 50% counseling and education as above. This is independent of the below procedure time.    Procedure: Chemodenervation to bilateral lower extremities utilizing onabotulinum toxin type A, Botox. I reviewed at length the risks benefits and potential time course for onset and duration. She agrees and signed the consent form today as does the . Family members present also in agreement as well. Total therapeutic units of Botox lot -I6 expiration 2020 was utilized. NDC #9265-1289-23. Diluted with preservative-free normal saline ratio of 100 units per milliliter. This was divided into 2 syringes. 27-gauge recording EMG needle was utilized in conjunction with EMG guidance to identify the most active motor units while avoiding surrounding structures. Transferred to exam table and appropriately ground to access target areas. Additional assistance needed to hold legs because of involuntary spasm. All areas were cleansed with ChloraPrep. The following muscles were then injected  Left le. Adductor muscle group, 100 units divided over 3 sites  2. Hamstring, 100 units divided over 3 sites    Right le. Adductor muscle group, 100 units divided over 3 sites  2. Hamstring, 100 units divided over 3 sites.    Besides some involuntary spasms that occur during the needle procedure,  she tolerated very well.    Again, thank you for allowing me to participate in the care of your patient.      Sincerely,    Elio Dejesus MD

## 2018-01-31 NOTE — MR AVS SNAPSHOT
After Visit Summary   1/31/2018    Gloria Bang    MRN: [id number]           Patient Information     Date Of Birth          1985        Visit Information        Provider Department      1/31/2018 9:05 AM Elio Dejesus MD; [employer] [facility]        Today's Diagnoses     Muscle spasticity    -  1    Paraplegia (H)           Follow-ups after your visit        Follow-up notes from your care team     Return in about 3 months (around 4/30/2018).      Your next 10 appointments already scheduled     Apr 25, 2018 10:20 AM CDT   (Arrive by 10:05 AM)   Return Botox with Elio Dejesus MD   [facility] ([facility])    [address] [place] [id number] [phone]            Jul 18, 2018  9:00 AM CDT   (Arrive by 8:45 AM)   Return Botox with Elio Dejesus MD   [facility] ([facility])    [address] [place] [id number] [phone]              Who to contact     Please call your clinic at [phone] to:    Ask questions about your health    Make or cancel appointments    Discuss your medicines    Learn about your test results    Speak to your doctor   If you have compliments or concerns about an experience at your clinic, or if you wish to file a complaint, please contact [facility] Patient Relations at [phone] or email us at [email or web address]         Additional Information About Your Visit        MyChart Information     [employer] is an electronic gateway that provides easy, online access to your medical records. With [employer], you can request a clinic appointment, read your test results, renew a prescription or communicate with your care team.     To sign up for [employer] visit the website at [email or web address]   You will be asked  "to enter the access code listed below, as well as some personal information. Please follow the directions to create your username and password.     Your access code is: [email or web address]  Expires: 2018  6:30 AM     Your access code will  in 90 days. If you need help or a new code, please contact your [facility] or call [phone] for assistance.        Care EveryWhere ID     This is your Care EveryWhere ID. This could be used by other organizations to access your [place] medical records  [id number]        Your Vitals Were     Pulse Height                79 1.651 m (5' 5\")           Blood Pressure from Last 3 Encounters:   18 180/83   11/15/17 173/84   17 146/69    Weight from Last 3 Encounters:   17 73 kg (160 lb 15 oz)   17 73 kg (161 lb)   17 73 kg (161 lb)              We Performed the Following     HC CHEMODENERVATION 1 EXTREMITY, EA ADDL EXTREMITY, 1-4 MUSCLE     HC CHEMODENERVATION ONE EXTREMITY, 1-4 MUSCLE     NEEDLE EMG GUIDE W CHEMODENERVATION        Primary Care Provider Office Phone # Fax #    Gina Eli PA-C [phone] [address] [address]        Equal Access to Services     HERNANDEZ OLEARY : Hadii aad ku hadasho Soomaali, waaxda luqadaha, qaybta kaalmada adeegyada, nadiya villalta hayrima blackwell . So [facility] [phone].    ATENCIÓN: Si habla español, tiene a kan disposición servicios gratuitos de asistencia lingüística. Llame al [phone].    We comply with applicable federal civil rights laws and [place] laws. We do not discriminate on the basis of race, color, national origin, age, disability, sex, sexual orientation, or gender identity.            Thank you!     Thank you for choosing [facility]  for your care. Our goal is always to provide you with excellent care. Hearing back from our patients is one way we can continue to improve " our services. Please take a few minutes to complete the written survey that you may receive in the mail after your visit with us. Thank you!             Your Updated Medication List - Protect others around you: Learn how to safely use, store and throw away your medicines at www.disposemymeds.org.          This list is accurate as of 1/31/18  9:54 AM.  Always use your most recent med list.                   Brand Name Dispense Instructions for use Diagnosis    acetaminophen 325 MG tablet    TYLENOL    100 tablet    Take 2 tablets (650 mg) by mouth every 4 hours as needed for mild pain    Muscle spasticity       KASSANDRA REID GREASELESS cream     113 g    Apply topically every 6 hours as needed    Chronic pain of both shoulders       bisacodyl 10 MG Suppository    DULCOLAX    30 suppository    Place 1 suppository (10 mg) rectally daily as needed    Other constipation       botulinum toxin type A 100 UNITS injection    BOTOX    400 Units    Inject 400 Units into the muscle every 3 months    Other muscle spasm       capsaicin 0.025 % Crea cream    ZOSTRIX    60 g    Apply 1 g topically 3 times daily as needed    Muscle spasticity       chlorhexidine 0.12 % solution    PERIDEX          * cholecalciferol 1000 UNIT tablet    vitamin D3          * vitamin D 2000 UNITS tablet     90 tablet    Take 2,000 Units by mouth daily    Vitamin D deficiency disease       fluticasone 50 MCG/ACT spray    FLONASE    3 Bottle    Spray 1 spray into both nostrils 2 times daily    Chronic rhinitis       labetalol 100 MG tablet    NORMODYNE    90 tablet    Take 1 tablet (100 mg) by mouth daily    Essential hypertension       levothyroxine 100 MCG tablet    SYNTHROID/LEVOTHROID    90 tablet    Take 1 tablet (100 mcg) by mouth daily    Acquired hypothyroidism       order for DME     1 Device    Equipment being ordered: electric williams lift that can be operated by one person.Patient's Height is 5 feet 9 inches; weight as of 8/15/13 is 160 pounds     Paraplegia (H)       * order for DME     1 Box    Equipment being ordered: gauze 4*4 pack of 100, refill prn    Surgical wound, non healing, sequela       * order for DME     100 Units    Equipment being ordered: 4*4 gauzes for dressing change    Open wound       * order for DME     1 each    BP cuff, brand as covered by insurance.  Dx: HTN    Elevated blood pressure       * order for DME     1 each    BP cuff, brand as covered by insurance.  Dx: HTN    Essential hypertension       * order for DME     90 Units    Equipment being ordered:  adult diapers for urinary incontinence - Fax to [employer] [phone]    Paraplegia (H), Continuous leakage(788.37)       * order for DME     120 pad    Equipment being ordered: Chux    Paraplegia (H), Continuous leakage(788.37)       Ovulation Predictor Kit     1 kit    Use daily Day 7 and 8 of cycle    Fertility testing       tiZANidine 2 MG tablet    ZANAFLEX    90 tablet    Take 1 tablet (2 mg) by mouth 3 times daily    Muscle spasm       * Notice:  This list has 8 medication(s) that are the same as other medications prescribed for you. Read the directions carefully, and ask your doctor or other care provider to review them with you.

## 2018-01-31 NOTE — PROGRESS NOTES
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 She is accompanied by her mother and Filipino  today. I saw her 11/15 which time he utilized 400 units of Botox divided evenly between the right and left leg adductors and hamstrings. She reports this definitely helped with the spasms that she has in her legs. The time prior had used 300 units an she feels the 400 unit was bit more effective. She denies any side effects from that injection. She believes the benefits started to wear off within the last few days. She is overall feeling healthy and well today with no new interval medical issues.    Physical exam:  /83  Pulse 79  Ht 5' 5\" (1.651 m)   Gloria is alert pleasant no diaphoresis. We use a Filipino  for communication. She arrives with a power wheelchair with right side joystick control and tilt capacity.  Involuntary spasms are noted in her bilateral lower extremities. These exacerbate with attempted passive range of motion. Different patterns including flexion and extension synergy are noted.  Modified Lorin 3/4 in both legs above the knee, 4/4 versus fixed contracture in the bilateral ankles and toes with plantar flexion inversion positioning.  Overlying skin in the thighs hamstrings is healthy.    Assessment and recommendations:  1. Gloria has severe spasticity in her lower extremities related to her spinal cord injury. This has been refractory to oral medication management. She benefited from chemodenervation to bilateral lower extremities 3 months ago and will repeat this today. Will increase the dose to 400 units as we anticipated given bilateral four large muscle distribution. Continue target muscles with hamstrings and adductors.  2. She should continue with positioning and stretching exercises.  3.  reviewed the " differences between physical therapy and maintenance program. We'll not send a new referral today. Information given about microwavable heating micro-bead pillows.  4. Follow-up in 3 months time, will call sooner if any side effects or other concerns arise.     20 minutes spent in direct patient interaction. Of that greater than 50% counseling and education as above. This is independent of the below procedure time.    Procedure: Chemodenervation to bilateral lower extremities utilizing onabotulinum toxin type A, Botox. I reviewed at length the risks benefits and potential time course for onset and duration. She agrees and signed the consent form today as does the . Family members present also in agreement as well. Total therapeutic units of Botox lot -O4 expiration 2020 was utilized. NDC #1791-9485-58. Diluted with preservative-free normal saline ratio of 100 units per milliliter. This was divided into 2 syringes. 27-gauge recording EMG needle was utilized in conjunction with EMG guidance to identify the most active motor units while avoiding surrounding structures. Transferred to exam table and appropriately ground to access target areas. Additional assistance needed to hold legs because of involuntary spasm. All areas were cleansed with ChloraPrep. The following muscles were then injected  Left le. Adductor muscle group, 100 units divided over 3 sites  2. Hamstring, 100 units divided over 3 sites    Right le. Adductor muscle group, 100 units divided over 3 sites  2. Hamstring, 100 units divided over 3 sites.    Besides some involuntary spasms that occur during the needle procedure, she tolerated very well.

## 2018-02-19 ENCOUNTER — OFFICE VISIT (OUTPATIENT)
Dept: FAMILY MEDICINE | Facility: CLINIC | Age: 33
End: 2018-02-19
Payer: COMMERCIAL

## 2018-02-19 VITALS
RESPIRATION RATE: 16 BRPM | SYSTOLIC BLOOD PRESSURE: 162 MMHG | HEART RATE: 82 BPM | OXYGEN SATURATION: 99 % | TEMPERATURE: 98.3 F | DIASTOLIC BLOOD PRESSURE: 90 MMHG

## 2018-02-19 DIAGNOSIS — E03.9 ACQUIRED HYPOTHYROIDISM: ICD-10-CM

## 2018-02-19 DIAGNOSIS — G82.20 PARAPLEGIA (H): ICD-10-CM

## 2018-02-19 DIAGNOSIS — E55.9 VITAMIN D DEFICIENCY: ICD-10-CM

## 2018-02-19 DIAGNOSIS — I10 ESSENTIAL HYPERTENSION: Primary | ICD-10-CM

## 2018-02-19 LAB
DEPRECATED CALCIDIOL+CALCIFEROL SERPL-MC: NORMAL UG/L (ref 20–75)
TSH SERPL DL<=0.005 MIU/L-ACNC: NORMAL MU/L (ref 0.4–4)

## 2018-02-19 PROCEDURE — 99214 OFFICE O/P EST MOD 30 MIN: CPT | Performed by: PHYSICIAN ASSISTANT

## 2018-02-19 RX ORDER — LABETALOL 100 MG/1
100 TABLET, FILM COATED ORAL 2 TIMES DAILY
Qty: 180 TABLET | Refills: 1 | Status: SHIPPED | OUTPATIENT
Start: 2018-02-19 | End: 2019-06-26

## 2018-02-19 NOTE — NURSING NOTE
"Chief Complaint   Patient presents with     Hypertension     Thyroid Problem       Initial /90  Pulse 82  Temp 98.3  F (36.8  C) (Tympanic)  Resp 16  SpO2 99% Estimated body mass index is 26.78 kg/(m^2) as calculated from the following:    Height as of 8/23/17: 5' 5\" (1.651 m).    Weight as of 8/23/17: 160 lb 15 oz (73 kg).  Medication Reconciliation: complete   Debbie Artis CMA    "
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
## 2018-02-19 NOTE — MR AVS SNAPSHOT
After Visit Summary   2/19/2018    Gloria Bang    MRN: [id number]           Patient Information     Date Of Birth          1985        Visit Information        Provider Department      2/19/2018 2:20 PM Gina Eli PA-C; [employer] [facility]        Today's Diagnoses     Essential hypertension    -  1    Paraplegia (H)        Acquired hypothyroidism        Vitamin D deficiency           Follow-ups after your visit        Your next 10 appointments already scheduled     Feb 21, 2018  2:00 PM CST   LAB with RD LAB   [facility] ([facility])    [address] [place] [id number] [phone]           Please do not eat 10-12 hours before your appointment if you are coming in fasting for labs on lipids, cholesterol, or glucose (sugar). This does not apply to pregnant women. Water, hot tea and black coffee (with nothing added) are okay. Do not drink other fluids, diet soda or chew gum.            Apr 25, 2018 10:20 AM CDT   (Arrive by 10:05 AM)   Return Botox with Elio Dejesus MD   [facility] ([facility])    [address] [place] [id number] [phone]            Jul 18, 2018  9:00 AM CDT   (Arrive by 8:45 AM)   Return Botox with Elio Dejesus MD   [facility] ([facility])    [address] [place] [id number] [phone]              Future tests that were ordered for you today     Open Future Orders        Priority Expected Expires Ordered    TSH with free T4 reflex Routine 2/19/2018 2/19/2019 2/19/2018    Vitamin D Deficiency Routine 2/19/2018 2/19/2019 2/19/2018            Who to contact     If you have questions or need follow up information about today's clinic visit or your schedule please contact  "[facility] directly at [phone].  Normal or non-critical lab and imaging results will be communicated to you by MyChart, letter or phone within 4 business days after the clinic has received the results. If you do not hear from us within 7 days, please contact the clinic through Travadorhart or phone. If you have a critical or abnormal lab result, we will notify you by phone as soon as possible.  Submit refill requests through Xiaomi or call your pharmacy and they will forward the refill request to us. Please allow 3 business days for your refill to be completed.          Additional Information About Your Visit        TravadorharData Driven Delivery System Information     Xiaomi lets you send messages to your doctor, view your test results, renew your prescriptions, schedule appointments and more. To sign up, go to [email or web address] . Click on \"Log in\" on the left side of the screen, which will take you to the Welcome page. Then click on \"Sign up Now\" on the right side of the page.     You will be asked to enter the access code listed below, as well as some personal information. Please follow the directions to create your username and password.     Your access code is: [email or web address]  Expires: 2018  6:30 AM     Your access code will  in 90 days. If you need help or a new code, please call your [place] clinic or [phone].        Care EveryWhere ID     This is your Care EveryWhere ID. This could be used by other organizations to access your [place] medical records  [id number]        Your Vitals Were     Pulse Temperature Respirations Pulse Oximetry          82 98.3  F (36.8  C) (Tympanic) 16 99%         Blood Pressure from Last 3 Encounters:   18 162/90   18 180/83   11/15/17 173/84    Weight from Last 3 Encounters:   17 160 lb 15 oz (73 kg)   17 161 lb (73 kg)   17 161 lb (73 kg)              We Performed the Following     TSH with free T4 reflex     Vitamin D " Deficiency          Today's Medication Changes          These changes are accurate as of 2/19/18  3:57 PM.  If you have any questions, ask your nurse or doctor.               These medicines have changed or have updated prescriptions.        Dose/Directions    labetalol 100 MG tablet   Commonly known as:  NORMODYNE   This may have changed:  when to take this   Used for:  Essential hypertension   Changed by:  Gina Eli PA-C        Dose:  100 mg   Take 1 tablet (100 mg) by mouth 2 times daily   Quantity:  180 tablet   Refills:  1       * order for DME   This may have changed:  Another medication with the same name was changed. Make sure you understand how and when to take each.   Used for:  Surgical wound, non healing, sequela   Changed by:  Gina Eli PA-C        Equipment being ordered: gauze 4*4 pack of 100, refill prn   Quantity:  1 Box   Refills:  prn       * order for DME   This may have changed:  Another medication with the same name was changed. Make sure you understand how and when to take each.   Used for:  Open wound   Changed by:  Gina Eli PA-C        Equipment being ordered: 4*4 gauzes for dressing change   Quantity:  100 Units   Refills:  1       * order for DME   This may have changed:  Another medication with the same name was changed. Make sure you understand how and when to take each.   Used for:  Paraplegia (H), Continuous leakage(788.37)   Changed by:  Gina Eli PA-C        Equipment being ordered:  adult diapers for urinary incontinence - Fax to [employer] [phone]   Quantity:  90 Units   Refills:  5       * order for DME   This may have changed:  Another medication with the same name was changed. Make sure you understand how and when to take each.   Used for:  Paraplegia (H), Continuous leakage(788.37)   Changed by:  Gina Eli PA-C        Equipment being ordered: Chux   Quantity:  120 pad   Refills:  5       * order for DME   This may  have changed:  Another medication with the same name was changed. Make sure you understand how and when to take each.   Used for:  Essential hypertension   Changed by:  Gina Eli PA-C        BP cuff, brand as covered by insurance.  Dx: HTN   Quantity:  1 each   Refills:  0       * order for DME   This may have changed:  additional instructions   Used for:  Paraplegia (H)   Changed by:  Gina Eli PA-C        Equipment being ordered: motorized wheelchair   Quantity:  1 each   Refills:  0       * Notice:  This list has 6 medication(s) that are the same as other medications prescribed for you. Read the directions carefully, and ask your doctor or other care provider to review them with you.      Stop taking these medicines if you haven't already. Please contact your care team if you have questions.     bisacodyl 10 MG Suppository   Commonly known as:  DULCOLAX   Stopped by:  Gina Eli PA-C           capsaicin 0.025 % Crea cream   Commonly known as:  ZOSTRIX   Stopped by:  Gina Eli PA-C           Ovulation Predictor Kit   Stopped by:  Gina Eli PA-C           tiZANidine 2 MG tablet   Commonly known as:  ZANAFLEX   Stopped by:  Gina Eli PA-C                Where to get your medicines      These medications were sent to [facility] - [address] [address]     Phone:  [phone]     labetalol 100 MG tablet         Some of these will need a paper prescription and others can be bought over the counter.  Ask your nurse if you have questions.     Bring a paper prescription for each of these medications     order for DME    order for DME                Primary Care Provider Office Phone # Fax #    Gina Eli PA-C [phone]       [address] [address]        Equal Access to Services     HERNANDEZ OLEARY : collin Ziegler, navin de la rosa,  nadiya joytito santos'aan ah. So [facility] [phone].    ATENCIÓN: Si kylah todd, tiene a kan disposición servicios gratuitos de asistencia lingüística. Carmina mccall [phone].    We comply with applicable federal civil rights laws and [place] laws. We do not discriminate on the basis of race, color, national origin, age, disability, sex, sexual orientation, or gender identity.            Thank you!     Thank you for choosing [facility]  for your care. Our goal is always to provide you with excellent care. Hearing back from our patients is one way we can continue to improve our services. Please take a few minutes to complete the written survey that you may receive in the mail after your visit with us. Thank you!             Your Updated Medication List - Protect others around you: Learn how to safely use, store and throw away your medicines at www.disposemymeds.org.          This list is accurate as of 2/19/18  3:57 PM.  Always use your most recent med list.                   Brand Name Dispense Instructions for use Diagnosis    acetaminophen 325 MG tablet    TYLENOL    100 tablet    Take 2 tablets (650 mg) by mouth every 4 hours as needed for mild pain    Muscle spasticity       KASSANDRA REID GREASELESS cream     113 g    Apply topically every 6 hours as needed    Chronic pain of both shoulders       botulinum toxin type A 100 UNITS injection    BOTOX    400 Units    Inject 400 Units into the muscle every 3 months    Other muscle spasm       fluticasone 50 MCG/ACT spray    FLONASE    3 Bottle    Spray 1 spray into both nostrils 2 times daily    Chronic rhinitis       labetalol 100 MG tablet    NORMODYNE    180 tablet    Take 1 tablet (100 mg) by mouth 2 times daily    Essential hypertension       levothyroxine 100 MCG tablet    SYNTHROID/LEVOTHROID    90 tablet    Take 1 tablet (100 mcg) by mouth daily    Acquired hypothyroidism       order for DME     1 Device     Equipment being ordered: electric williams lift that can be operated by one person.Patient's Height is 5 feet 9 inches; weight as of 8/15/13 is 160 pounds    Paraplegia (H)       * order for DME     1 Box    Equipment being ordered: gauze 4*4 pack of 100, refill prn    Surgical wound, non healing, sequela       * order for DME     100 Units    Equipment being ordered: 4*4 gauzes for dressing change    Open wound       * order for DME     90 Units    Equipment being ordered:  adult diapers for urinary incontinence - Fax to [employer] [phone]    Paraplegia (H), Continuous leakage(788.37)       * order for DME     120 pad    Equipment being ordered: Chux    Paraplegia (H), Continuous leakage(788.37)       * order for DME     1 each    BP cuff, brand as covered by insurance.  Dx: HTN    Essential hypertension       * order for DME     1 each    Equipment being ordered: motorized wheelchair    Paraplegia (H)       vitamin D 2000 UNITS tablet     90 tablet    Take 2,000 Units by mouth daily    Vitamin D deficiency disease       * Notice:  This list has 6 medication(s) that are the same as other medications prescribed for you. Read the directions carefully, and ask your doctor or other care provider to review them with you.

## 2018-03-07 ENCOUNTER — TELEPHONE (OUTPATIENT)
Dept: FAMILY MEDICINE | Facility: CLINIC | Age: 33
End: 2018-03-07

## 2018-03-07 NOTE — TELEPHONE ENCOUNTER
Our goal is to have forms completed within 72 hours, however some forms may require a visit or additional information.    What clinic location was the form placed at [facility] or [place].?     Who is the form from? [employer] - [employer]  Where did the form come from? Faxed to clinic   The form was placed in the inbox of TONI Rodriguez      Please fax to [phone]    Additional comments:     Call take on 3/7/2018 at 10:59 AM by Linda Shankar

## 2018-04-10 ENCOUNTER — TELEPHONE (OUTPATIENT)
Dept: FAMILY MEDICINE | Facility: CLINIC | Age: 33
End: 2018-04-10

## 2018-04-10 NOTE — TELEPHONE ENCOUNTER
Our goal is to have forms completed within 72 hours, however some forms may require a visit or additional information.    What clinic location was the form placed at [facility] or [place].?     Who is the form from? [employer] - Therapy evaluation for power wheelchair replacement  Where did the form come from? Faxed to clinic   The form was placed in the inbox of TONI Rodriguez      Please fax to [phone]    Additional comments:     Call take on 4/10/2018 at 11:58 AM by Linda Shankar

## 2018-04-20 ENCOUNTER — OFFICE VISIT (OUTPATIENT)
Dept: FAMILY MEDICINE | Facility: CLINIC | Age: 33
End: 2018-04-20
Payer: COMMERCIAL

## 2018-04-20 VITALS
HEART RATE: 80 BPM | SYSTOLIC BLOOD PRESSURE: 138 MMHG | DIASTOLIC BLOOD PRESSURE: 82 MMHG | RESPIRATION RATE: 16 BRPM | OXYGEN SATURATION: 100 %

## 2018-04-20 DIAGNOSIS — G82.20 PARAPLEGIA (H): ICD-10-CM

## 2018-04-20 DIAGNOSIS — E03.9 ACQUIRED HYPOTHYROIDISM: Primary | ICD-10-CM

## 2018-04-20 DIAGNOSIS — N31.9 NEUROGENIC BLADDER: ICD-10-CM

## 2018-04-20 DIAGNOSIS — I10 ESSENTIAL HYPERTENSION: ICD-10-CM

## 2018-04-20 DIAGNOSIS — E55.9 VITAMIN D DEFICIENCY: ICD-10-CM

## 2018-04-20 PROCEDURE — 82306 VITAMIN D 25 HYDROXY: CPT | Performed by: PHYSICIAN ASSISTANT

## 2018-04-20 PROCEDURE — 84443 ASSAY THYROID STIM HORMONE: CPT | Performed by: PHYSICIAN ASSISTANT

## 2018-04-20 PROCEDURE — 99214 OFFICE O/P EST MOD 30 MIN: CPT | Performed by: PHYSICIAN ASSISTANT

## 2018-04-20 PROCEDURE — 36415 COLL VENOUS BLD VENIPUNCTURE: CPT | Performed by: PHYSICIAN ASSISTANT

## 2018-04-20 NOTE — MR AVS SNAPSHOT
After Visit Summary   4/20/2018    Gloria Bang    MRN: [id number]           Patient Information     Date Of Birth          1985        Visit Information        Provider Department      4/20/2018 9:30 AM Gina Eli PA-C; [employer] [facility]        Today's Diagnoses     Acquired hypothyroidism    -  1    Neurogenic bladder        Essential hypertension        Paraplegia (H)        Vitamin D deficiency           Follow-ups after your visit        Your next 10 appointments already scheduled     Apr 25, 2018 10:20 AM CDT   (Arrive by 10:05 AM)   Return Botox with Elio Dejesus MD   [facility] ([facility])    [address] [phone] [phone]            Apr 27, 2018  3:40 PM CDT   [facility] with Gina Eli PA-C   [facility]    [address] [place] [id number]   [phone]            Jul 18, 2018  9:00 AM CDT   (Arrive by 8:45 AM)   Return Botox with Elio Dejesus MD   [facility] ([facility])    [address] [phone] [phone]              Who to contact     If you have questions or need follow up information about today's clinic visit or your schedule please contact [facility] directly at [phone].  Normal or non-critical lab and imaging results will be communicated to you by MyChart, letter or phone within 4 business days after the clinic has received the results. If you do not hear from us within 7 days, please contact the clinic through MyChart or phone. If you have a critical or abnormal lab result, we will notify you by phone as soon as possible.  Submit refill requests through  "Christ[employer] or call your pharmacy and they will forward the refill request to us. Please allow 3 business days for your refill to be completed.          Additional Information About Your Visit        MyChart Information     WellAWARE Systemshart lets you send messages to your doctor, view your test results, renew your prescriptions, schedule appointments and more. To sign up, go to [email or web address] . Click on \"Log in\" on the left side of the screen, which will take you to the Welcome page. Then click on \"Sign up Now\" on the right side of the page.     You will be asked to enter the access code listed below, as well as some personal information. Please follow the directions to create your username and password.     Your access code is: [email or web address]  Expires: 2018 10:12 AM     Your access code will  in 90 days. If you need help or a new code, please call your [place] clinic or [phone].        Care EveryWhere ID     This is your Care EveryWhere ID. This could be used by other organizations to access your [place] medical records  [id number]        Your Vitals Were     Pulse Respirations Pulse Oximetry             80 16 100%          Blood Pressure from Last 3 Encounters:   18 138/82   18 162/90   18 180/83    Weight from Last 3 Encounters:   17 160 lb 15 oz (73 kg)   17 161 lb (73 kg)   17 161 lb (73 kg)              We Performed the Following     TSH with free T4 reflex     Vitamin D Deficiency          Where to get your medicines      Some of these will need a paper prescription and others can be bought over the counter.  Ask your nurse if you have questions.     Bring a paper prescription for each of these medications     order for DME          Primary Care Provider Office Phone # Fax #    Gina Eli PA-C [phone] [address] [address]        Equal Access to Services     HERNANDEZ OLEARY AH: Nicole Yun, " waluannda jennifer, qaybta kaalfacundo de la rosa, nadiya cordero alexandriabisi del angelaadebbie ah. So [facility] [phone].    ATENCIÓN: Si kylah todd, tiene a kan disposición servicios gratuitos de asistencia lingüística. Carmina al [phone].    We comply with applicable federal civil rights laws and [place] laws. We do not discriminate on the basis of race, color, national origin, age, disability, sex, sexual orientation, or gender identity.            Thank you!     Thank you for choosing [facility]  for your care. Our goal is always to provide you with excellent care. Hearing back from our patients is one way we can continue to improve our services. Please take a few minutes to complete the written survey that you may receive in the mail after your visit with us. Thank you!             Your Updated Medication List - Protect others around you: Learn how to safely use, store and throw away your medicines at www.disposemymeds.org.          This list is accurate as of 4/20/18 10:12 AM.  Always use your most recent med list.                   Brand Name Dispense Instructions for use Diagnosis    acetaminophen 325 MG tablet    TYLENOL    100 tablet    Take 2 tablets (650 mg) by mouth every 4 hours as needed for mild pain    Muscle spasticity       KASSANDRA REID GREASELESS cream     113 g    Apply topically every 6 hours as needed    Chronic pain of both shoulders       botulinum toxin type A 100 units injection    BOTOX    400 Units    Inject 400 Units into the muscle every 3 months    Other muscle spasm       fluticasone 50 MCG/ACT spray    FLONASE    3 Bottle    Spray 1 spray into both nostrils 2 times daily    Chronic rhinitis       labetalol 100 MG tablet    NORMODYNE    180 tablet    Take 1 tablet (100 mg) by mouth 2 times daily    Essential hypertension       levothyroxine 100 MCG tablet    SYNTHROID/LEVOTHROID    90 tablet    Take 1 tablet (100 mcg) by mouth daily    Acquired hypothyroidism        order for DME     1 Device    Equipment being ordered: electric williams lift that can be operated by one person.Patient's Height is 5 feet 9 inches; weight as of 8/15/13 is 160 pounds    Paraplegia (H)       order for DME     1 Box    Equipment being ordered: gauze 4*4 pack of 100, refill prn    Surgical wound, non healing, sequela       order for DME     100 Units    Equipment being ordered: 4*4 gauzes for dressing change    Open wound       * order for DME     120 pad    Equipment being ordered: Chux    Paraplegia (H), Continuous leakage(788.37)       * order for DME     1 each    BP cuff, brand as covered by insurance.  Dx: HTN    Essential hypertension       * order for DME     1 each    Equipment being ordered: motorized wheelchair    Paraplegia (H)       order for DME     90 Units    Equipment being ordered:  adult diapers for urinary incontinence - Fax to [employer] [phone]    Paraplegia (H), Neurogenic bladder       vitamin D 2000 units tablet     90 tablet    Take 2,000 Units by mouth daily    Vitamin D deficiency disease       * Notice:  This list has 3 medication(s) that are the same as other medications prescribed for you. Read the directions carefully, and ask your doctor or other care provider to review them with you.

## 2018-04-20 NOTE — NURSING NOTE
"Chief Complaint   Patient presents with     Thyroid Problem     recheck thyroid       Initial /82  Pulse 80  Resp 16  SpO2 100% Estimated body mass index is 26.78 kg/(m^2) as calculated from the following:    Height as of 8/23/17: 5' 5\" (1.651 m).    Weight as of 8/23/17: 160 lb 15 oz (73 kg).  Medication Reconciliation: complete   BETHANY Harper CMA      "

## 2018-04-20 NOTE — LETTER
April 21, 2018      Gloria Bang  [address]  [facility] [address]        Dear Gloria,    We are writing to inform you of your test results.    - Your TSH was normal, indicating that you are on the correct dose of thyroid medication.  - Your Vitamin D level is normal. Continue taking 2000 IU per day of vitamin D.  - Please schedule your visit for a Pap test as soon as possible.    Results for orders placed or performed in visit on 04/20/18   TSH with free T4 reflex   Result Value Ref Range    TSH 2.16 0.40 - 4.00 mU/L   Vitamin D Deficiency   Result Value Ref Range    Vitamin D Deficiency screening 25 20 - 75 ug/L     Thank you for choosing [facility].  We appreciate the opportunity to serve you and look forward to supporting your healthcare needs in the future.    If you have any questions or concerns, please call me or my staff at [phone].      Sincerely,        Gina Eli PA-C
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
## 2018-04-21 LAB
DEPRECATED CALCIDIOL+CALCIFEROL SERPL-MC: 25 UG/L (ref 20–75)
TSH SERPL DL<=0.005 MIU/L-ACNC: 2.16 MU/L (ref 0.4–4)

## 2018-04-21 NOTE — PROGRESS NOTES
Lab letter signed and printed. Message comments below:  - Your TSH was normal, indicating that you are on the correct dose of thyroid medication.  - Your Vitamin D level is normal. Continue taking 2000 IU per day of vitamin D.

## 2018-04-25 ENCOUNTER — OFFICE VISIT (OUTPATIENT)
Dept: PHYSICAL MEDICINE AND REHAB | Facility: CLINIC | Age: 33
End: 2018-04-25
Payer: COMMERCIAL

## 2018-04-25 VITALS — HEIGHT: 65 IN | HEART RATE: 83 BPM | SYSTOLIC BLOOD PRESSURE: 158 MMHG | DIASTOLIC BLOOD PRESSURE: 74 MMHG

## 2018-04-25 DIAGNOSIS — M62.838 MUSCLE SPASTICITY: Primary | ICD-10-CM

## 2018-04-25 ASSESSMENT — PAIN SCALES - GENERAL: PAINLEVEL: NO PAIN (0)

## 2018-04-25 NOTE — LETTER
"4/25/2018       RE: Gloria Bang  [address]  [facility] [address]     Dear Colleague,    Thank you for referring your patient, Gloria Bang, to the [facility] PHYSICAL MEDICINE AND REHABILITATION at [facility]. Please see a copy of my visit note below.    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 She is accompanied by her brother and Cambodian  today. I last saw her 1/31/2018 which time he utilized 400 units of Botox divided evenly between the right and left leg adductors and hamstrings. Prior times 8/23/17 300 U, 11/15/17 400 U. She reports this definitely helped with the spasms that she has in her legs.  She reports about a month ago her spasms have increased for a little bit but then settled back down.  She is pleased with the results.  She asks about increasing the dose today as she thought that each time we did the injections dose would keep going up.   She denies any side effects from prior injections. She believes is not sure the benefits have worn off. She is overall feeling healthy and well today with no new interval medical issues.    Physical exam:  /74 (BP Location: Right arm, Patient Position: Sitting, Cuff Size: Adult Large)  Pulse 83  Ht 5' 5\" (1.651 m)   Gloria is alert pleasant no diaphoresis. We use a Cambodian  for communication. She arrives with a power wheelchair with right side joystick control and tilt capacity.  Involuntary spasms are noted in her bilateral lower extremities. These exacerbate with attempted passive range of motion. Mostly flexion predominant synergy pattern. Some extension aspects at times.  Modified Lorin 3/4 in both legs above the knee, 4/4 versus fixed contracture in the bilateral ankles and toes with plantar flexion inversion " positioning (unchanged since prior visits).  Overlying skin in the thighs hamstrings is healthy.    Assessment and recommendations:  1. Gloria has severe spasticity in her lower extremities related to her spinal cord injury and has benefited from chemodenervation with botulinum toxin in the past.  We will repeat these injections today.  Target same muscle same as last time bilateral hamstrings and adductors.  2. Clarified with her the 400 unit dose is the max currently.  3. She should continue with positioning and stretching exercises.  4. Follow-up in 3 months time, will call sooner if any side effects or other concerns arise.       Procedure: Chemodenervation to bilateral lower extremities utilizing onabotulinum toxin type A, Botox. I reviewed at length the risks benefits and potential time course for onset and duration. She agrees and signed the consent form today as does the . Family members present also in agreement as well. Total therapeutic units of Botox lot -H4 expiration 2020 was utilized. NDC #5584-4838-52. Diluted with preservative-free normal saline ratio of 100 units per milliliter. This was divided into 2 syringes. 27-gauge recording EMG needle was utilized in conjunction with EMG guidance to identify the most active motor units while avoiding surrounding structures. Transferred to exam table and appropriately gowned to access target areas. Additional assistance needed to hold legs because of involuntary spasm. All areas were cleansed with ChloraPrep. The following muscles were then injected  Left le. Adductor muscle group, 100 units divided over 3 sites  2. Hamstring, 100 units divided over 3 sites    Right le. Adductor muscle group, 100 units divided over 3 sites  2. Hamstring, 100 units divided over 3 sites.    Besides some involuntary spasms that occur during the needle procedure, she tolerated very well.    Again, thank you for allowing me to participate in the care  of your patient.      Sincerely,    Eilo Dejesus MD

## 2018-04-25 NOTE — MR AVS SNAPSHOT
After Visit Summary   4/25/2018    Gloria Bang    MRN: [id number]           Patient Information     Date Of Birth          1985        Visit Information        Provider Department      4/25/2018 10:05 AM Elio Dejesus MD; [employer] [facility] Physical Medicine and Rehabilitation         Follow-ups after your visit        Your next 10 appointments already scheduled     Apr 27, 2018  3:40 PM CDT   [facility] Gina Eli [facility]-C   [facility]    [address]  [address]  [facility] [address]   [phone]            Jul 18, 2018  9:00 AM CDT   (Arrive by 8:45 AM)   Return Botox with Elio Dejesus MD   [facility] Physical Medicine and Rehabilitation ([facility])    [address] [place] [id number] [phone]            Oct 17, 2018  9:40 AM CDT   (Arrive by 9:25 AM)   Return Botox with Elio Dejesus MD   [facility] Physical Medicine and Rehabilitation ([facility])    [address] [place] [id number] [phone]              Who to contact     Please call your clinic at [phone] to:    Ask questions about your health    Make or cancel appointments    Discuss your medicines    Learn about your test results    Speak to your doctor            Additional Information About Your Visit        MyChart Information     Knock Knockt is an electronic gateway that provides easy, online access to your medical records. With [employer], you can request a clinic appointment, read your test results, renew a prescription or communicate with your care team.     To sign up for Knock Knockt visit the website at [email or web address]   You will be asked to enter the access code listed below, as well as some personal information. Please follow the directions to create your username and password.    "  Your access code is: [email or web address]  Expires: 2018 10:12 AM     Your access code will  in 90 days. If you need help or a new code, please contact your [facility] or call [phone] for assistance.        Care EveryWhere ID     This is your Care EveryWhere ID. This could be used by other organizations to access your [place] medical records  [id number]        Your Vitals Were     Pulse Height                83 1.651 m (5' 5\")           Blood Pressure from Last 3 Encounters:   18 158/74   18 138/82   18 162/90    Weight from Last 3 Encounters:   17 73 kg (160 lb 15 oz)   17 73 kg (161 lb)   17 73 kg (161 lb)              Today, you had the following     No orders found for display       Primary Care Provider Office Phone # Fax #    Gina Eli PA-C [phone]       [address] [facility] [address]  [facility] [address]        Equal Access to Services     [facility]: Hadii aad ku hadasho Soomaali, waaxda luqadaha, qaybta kaalmada adeegyada, nadiya blackwell . So [facility] [phone].    ATENCIÓN: Si habla español, tiene a kan disposición servicios gratuitos de asistencia lingüística. Carmina al [phone].    We comply with applicable federal civil rights laws and [place] laws. We do not discriminate on the basis of race, color, national origin, age, disability, sex, sexual orientation, or gender identity.            Thank you!     Thank you for choosing [facility] PHYSICAL MEDICINE AND REHABILITATION  for your care. Our goal is always to provide you with excellent care. Hearing back from our patients is one way we can continue to improve our services. Please take a few minutes to complete the written survey that you may receive in the mail after your visit with us. Thank you!             Your Updated Medication List - Protect others around you: Learn how to safely use, store and throw away your " medicines at www.disposemymeds.org.          This list is accurate as of 4/25/18 11:19 AM.  Always use your most recent med list.                   Brand Name Dispense Instructions for use Diagnosis    acetaminophen 325 MG tablet    TYLENOL    100 tablet    Take 2 tablets (650 mg) by mouth every 4 hours as needed for mild pain    Muscle spasticity       KASSANDRA REID GREASELESS cream     113 g    Apply topically every 6 hours as needed    Chronic pain of both shoulders       botulinum toxin type A 100 units injection    BOTOX    400 Units    Inject 400 Units into the muscle every 3 months    Other muscle spasm       fluticasone 50 MCG/ACT spray    FLONASE    3 Bottle    Spray 1 spray into both nostrils 2 times daily    Chronic rhinitis       labetalol 100 MG tablet    NORMODYNE    180 tablet    Take 1 tablet (100 mg) by mouth 2 times daily    Essential hypertension       levothyroxine 100 MCG tablet    SYNTHROID/LEVOTHROID    90 tablet    Take 1 tablet (100 mcg) by mouth daily    Acquired hypothyroidism       order for DME     1 Device    Equipment being ordered: electric williams lift that can be operated by one person.Patient's Height is 5 feet 9 inches; weight as of 8/15/13 is 160 pounds    Paraplegia (H)       order for DME     1 Box    Equipment being ordered: gauze 4*4 pack of 100, refill prn    Surgical wound, non healing, sequela       order for DME     100 Units    Equipment being ordered: 4*4 gauzes for dressing change    Open wound       * order for DME     120 pad    Equipment being ordered: Chux    Paraplegia (H), Continuous leakage(788.37)       * order for DME     1 each    BP cuff, brand as covered by insurance.  Dx: HTN    Essential hypertension       * order for DME     1 each    Equipment being ordered: motorized wheelchair    Paraplegia (H)       order for DME     90 Units    Equipment being ordered:  adult diapers for urinary incontinence - Fax to [employer] [phone]    Paraplegia (H),  Neurogenic bladder       vitamin D 2000 units tablet     90 tablet    Take 2,000 Units by mouth daily    Vitamin D deficiency disease       * Notice:  This list has 3 medication(s) that are the same as other medications prescribed for you. Read the directions carefully, and ask your doctor or other care provider to review them with you.

## 2018-04-25 NOTE — PROGRESS NOTES
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 She is accompanied by her brother and Togolese  today. I last saw her 1/31/2018 which time he utilized 400 units of Botox divided evenly between the right and left leg adductors and hamstrings. Prior times 8/23/17 300 U, 11/15/17 400 U. She reports this definitely helped with the spasms that she has in her legs.  She reports about a month ago her spasms have increased for a little bit but then settled back down.  She is pleased with the results.  She asks about increasing the dose today as she thought that each time we did the injections dose would keep going up.   She denies any side effects from prior injections. She believes is not sure the benefits have worn off. She is overall feeling healthy and well today with no new interval medical issues.    Physical exam:  /74 (BP Location: Right arm, Patient Position: Sitting, Cuff Size: Adult Large)  Pulse 83  Ht 5' 5\" (1.651 m)   Gloria is alert pleasant no diaphoresis. We use a Togolese  for communication. She arrives with a power wheelchair with right side joystick control and tilt capacity.  Involuntary spasms are noted in her bilateral lower extremities. These exacerbate with attempted passive range of motion. Mostly flexion predominant synergy pattern. Some extension aspects at times.  Modified Lorin 3/4 in both legs above the knee, 4/4 versus fixed contracture in the bilateral ankles and toes with plantar flexion inversion positioning (unchanged since prior visits).  Overlying skin in the thighs hamstrings is healthy.    Assessment and recommendations:  1. Gloria has severe spasticity in her lower extremities related to her spinal cord injury and has benefited from chemodenervation with botulinum toxin in the past.  We will repeat " these injections today.  Target same muscle same as last time bilateral hamstrings and adductors.  2. Clarified with her the 400 unit dose is the max currently.  3. She should continue with positioning and stretching exercises.  4. Follow-up in 3 months time, will call sooner if any side effects or other concerns arise.       Procedure: Chemodenervation to bilateral lower extremities utilizing onabotulinum toxin type A, Botox. I reviewed at length the risks benefits and potential time course for onset and duration. She agrees and signed the consent form today as does the . Family members present also in agreement as well. Total therapeutic units of Botox lot -C6 expiration 2020 was utilized. NDC #0009-0915-40. Diluted with preservative-free normal saline ratio of 100 units per milliliter. This was divided into 2 syringes. 27-gauge recording EMG needle was utilized in conjunction with EMG guidance to identify the most active motor units while avoiding surrounding structures. Transferred to exam table and appropriately gowned to access target areas. Additional assistance needed to hold legs because of involuntary spasm. All areas were cleansed with ChloraPrep. The following muscles were then injected  Left le. Adductor muscle group, 100 units divided over 3 sites  2. Hamstring, 100 units divided over 3 sites    Right le. Adductor muscle group, 100 units divided over 3 sites  2. Hamstring, 100 units divided over 3 sites.    Besides some involuntary spasms that occur during the needle procedure, she tolerated very well.

## 2018-05-07 ENCOUNTER — OFFICE VISIT (OUTPATIENT)
Dept: FAMILY MEDICINE | Facility: CLINIC | Age: 33
End: 2018-05-07
Payer: COMMERCIAL

## 2018-05-07 VITALS
OXYGEN SATURATION: 100 % | RESPIRATION RATE: 16 BRPM | HEART RATE: 80 BPM | SYSTOLIC BLOOD PRESSURE: 138 MMHG | DIASTOLIC BLOOD PRESSURE: 80 MMHG

## 2018-05-07 DIAGNOSIS — Z01.419 WELL WOMAN EXAM WITH ROUTINE GYNECOLOGICAL EXAM: Primary | ICD-10-CM

## 2018-05-07 DIAGNOSIS — Z12.4 SCREENING FOR MALIGNANT NEOPLASM OF CERVIX: ICD-10-CM

## 2018-05-07 DIAGNOSIS — E55.9 VITAMIN D DEFICIENCY DISEASE: ICD-10-CM

## 2018-05-07 PROCEDURE — G0476 HPV COMBO ASSAY CA SCREEN: HCPCS | Performed by: PHYSICIAN ASSISTANT

## 2018-05-07 PROCEDURE — 99395 PREV VISIT EST AGE 18-39: CPT | Performed by: PHYSICIAN ASSISTANT

## 2018-05-07 PROCEDURE — G0145 SCR C/V CYTO,THINLAYER,RESCR: HCPCS | Performed by: PHYSICIAN ASSISTANT

## 2018-05-07 RX ORDER — CHOLECALCIFEROL (VITAMIN D3) 50 MCG
2000 TABLET ORAL DAILY
Qty: 90 TABLET | Refills: 4 | Status: SHIPPED | OUTPATIENT
Start: 2018-05-07 | End: 2019-06-28 | Stop reason: DRUGHIGH

## 2018-05-07 NOTE — LETTER
May 14, 2018    Gloria Bang  [address]  [facility] [address]    Dear Gloria,  We are happy to inform you that your PAP smear result from 05/07/18 is normal.  We are now able to do a follow up test on PAP smears. The DNA test is for HPV (Human Papilloma Virus). Cervical cancer is closely linked with certain types of HPV. Your results showed no evidence of high risk HPV.  Therefore we recommend you return in 5 years for your next pap smear and HPV test.  You will still need to return to the clinic every year for an annual exam and other preventive tests.  Please contact the clinic at [phone] with any questions.  Sincerely,    Gina Eli PA-C/manny

## 2018-05-07 NOTE — PROGRESS NOTES
SUBJECTIVE:   CC: Gloria Bang is an 33 year old woman who presents for preventive health visit.     Physical   Annual:     Getting at least 3 servings of Calcium per day::  NO    Bi-annual eye exam::  NO    Dental care twice a year::  NO    Sleep apnea or symptoms of sleep apnea::  None    Diet::  Low salt    Frequency of exercise::  1 day/week    Duration of exercise::  N/A    Taking medications regularly::  No    Barriers to taking medications::  Problems remembering to take them          Presents today for well woman exam.      Today's PHQ-2 Score:   PHQ-2 ( 1999 [employer]) 5/7/2018   Q1: Little interest or pleasure in doing things 0   Q2: Feeling down, depressed or hopeless 0   PHQ-2 Score 0   Q1: Little interest or pleasure in doing things Not at all   Q2: Feeling down, depressed or hopeless Not at all   PHQ-2 Score 0     Abuse: Current or Past(Physical, Sexual or Emotional)- No  Do you feel safe in your environment - Yes    Social History   Substance Use Topics     Smoking status: Never Smoker     Smokeless tobacco: Never Used     Alcohol use No     Alcohol Use 5/7/2018   If you drink alcohol do you typically have greater than 3 drinks per day OR greater than 7 drinks per week? No     Reviewed orders with patient.  Reviewed health maintenance and updated orders accordingly - Yes    Mammogram not appropriate for this patient based on age.    Pertinent mammograms are reviewed under the imaging tab.  History of abnormal Pap smear: NO - age 30-65 PAP every 5 years with negative HPV co-testing recommended    Reviewed and updated as needed this visit by clinical staff  Tobacco  Allergies  Meds  Problems  Med Hx  Surg Hx  Fam Hx  Soc Hx          Reviewed and updated as needed this visit by Provider  Allergies  Meds  Problems            Review of Systems  CONSTITUTIONAL: NEGATIVE for fever, chills, change in weight  INTEGUMENTARU/SKIN: NEGATIVE for worrisome rashes, moles or lesions  EYES: NEGATIVE for  "vision changes or irritation  ENT: NEGATIVE for ear, mouth and throat problems  RESP: NEGATIVE for significant cough or SOB  BREAST: NEGATIVE for masses, tenderness or discharge  CV: NEGATIVE for chest pain, palpitations or peripheral edema  GI: NEGATIVE for nausea, abdominal pain, heartburn, or change in bowel habits  : NEGATIVE for unusual urinary or vaginal symptoms. Periods are regular.  MUSCULOSKELETAL: NEGATIVE for significant arthralgias or myalgia  NEURO: NEGATIVE for weakness, dizziness or paresthesias  PSYCHIATRIC: NEGATIVE for changes in mood or affect     OBJECTIVE:   /80  Pulse 80  Resp 16  SpO2 100%  Physical Exam  GENERAL: healthy, alert and no distress  EYES: Eyes grossly normal to inspection, conjunctivae and sclerae normal  HENT: normocephalic, mucus membranes moist  NECK: supple, symmetric  BREAST: normal without masses, tenderness or nipple discharge and no palpable axillary masses or adenopathy  ABDOMEN: soft, NTND   (female): normal female external genitalia, normal urethral meatus, vaginal mucosa pink, moist, well rugated, and normal cervix/adnexa/uterus without masses or discharge  SKIN: no suspicious lesions or rashes  PSYCH: mentation appears normal, affect normal/bright    ASSESSMENT/PLAN:       ICD-10-CM    1. Well woman exam with routine gynecological exam Z01.419    2. Vitamin D deficiency disease E55.9 Cholecalciferol (VITAMIN D) 2000 units tablet   3. Screening for malignant neoplasm of cervix Z12.4 Pap imaged thin layer screen with HPV - recommended age 30 - 65 years (select HPV order below)     HPV High Risk Types DNA Cervical       COUNSELING:  Reviewed preventive health counseling, as reflected in patient instructions         reports that she has never smoked. She has never used smokeless tobacco.    Estimated body mass index is 26.78 kg/(m^2) as calculated from the following:    Height as of 8/23/17: 5' 5\" (1.651 m).    Weight as of 8/23/17: 160 lb 15 oz (73 kg). "       Counseling Resources:  ATP IV Guidelines  Pooled Cohorts Equation Calculator  Breast Cancer Risk Calculator  FRAX Risk Assessment  ICSI Preventive Guidelines  Dietary Guidelines for Americans, 2010  USDA's MyPlate  ASA Prophylaxis  Lung CA Screening    Gina Eli PA-C  [facility]

## 2018-05-07 NOTE — MR AVS SNAPSHOT
After Visit Summary   5/7/2018    Gloria Bang    MRN: [id number]           Patient Information     Date Of Birth          1985        Visit Information        Provider Department      5/7/2018 3:00 PM Gina Eli PA-C; [employer] [facility]        Today's Diagnoses     Well woman exam with routine gynecological exam    -  1    Vitamin D deficiency disease        Screening for malignant neoplasm of cervix          Care Instructions      Preventive Health Recommendations  Female Ages 26 - 39  Yearly exam:   See your health care provider every year in order to    Review health changes.     Discuss preventive care.      Review your medicines if you your doctor has prescribed any.    Until age 30: Get a Pap test every three years (more often if you have had an abnormal result).    After age 30: Talk to your doctor about whether you should have a Pap test every 3 years or have a Pap test with HPV screening every 5 years.   You do not need a Pap test if your uterus was removed (hysterectomy) and you have not had cancer.  You should be tested each year for STDs (sexually transmitted diseases), if you're at risk.   Talk to your provider about how often to have your cholesterol checked.  If you are at risk for diabetes, you should have a diabetes test (fasting glucose).  Shots: Get a flu shot each year. Get a tetanus shot every 10 years.   Nutrition:     Eat at least 5 servings of fruits and vegetables each day.    Eat whole-grain bread, whole-wheat pasta and brown rice instead of white grains and rice.    Talk to your provider about Calcium and Vitamin D.     Lifestyle    Exercise at least 150 minutes a week (30 minutes a day, 5 days of the week). This will help you control your weight and prevent disease.    Limit alcohol to one drink per day.    No smoking.     Wear sunscreen to prevent skin cancer.    See your dentist every six months for  "an exam and cleaning.            Follow-ups after your visit        Your next 10 appointments already scheduled     Jul 18, 2018  9:00 AM CDT   (Arrive by 8:45 AM)   Return Botox with Elio Dejesus MD   [facility] ([facility])    [address] [place] [id number] [phone]            Oct 17, 2018  9:40 AM CDT   (Arrive by 9:25 AM)   Return Botox with Elio Dejesus MD   [facility] ([facility])    [address] [place] [id number] [phone]              Who to contact     If you have questions or need follow up information about today's clinic visit or your schedule please contact [facility] directly at [phone].  Normal or non-critical lab and imaging results will be communicated to you by MyChart, letter or phone within 4 business days after the clinic has received the results. If you do not hear from us within 7 days, please contact the clinic through [employer] or phone. If you have a critical or abnormal lab result, we will notify you by phone as soon as possible.  Submit refill requests through [employer] or call your pharmacy and they will forward the refill request to us. Please allow 3 business days for your refill to be completed.          Additional Information About Your Visit        MyChart Information     [employer] lets you send messages to your doctor, view your test results, renew your prescriptions, schedule appointments and more. To sign up, go to [email or web address] . Click on \"Log in\" on the left side of the screen, which will take you to the Welcome page. Then click on \"Sign up Now\" on the right side of the page.     You will be asked to enter the access code listed below, as well as some personal information. Please follow the directions to create your username and password.   "   Your access code is: [email or web address]  Expires: 2018 10:12 AM     Your access code will  in 90 days. If you need help or a new code, please call your [facility] clinic or [phone].        Care EveryWhere ID     This is your Care EveryWhere ID. This could be used by other organizations to access your [facility] medical records  [id number]        Your Vitals Were     Pulse Respirations Pulse Oximetry             80 16 100%          Blood Pressure from Last 3 Encounters:   18 138/80   18 158/74   18 138/82    Weight from Last 3 Encounters:   17 160 lb 15 oz (73 kg)   17 161 lb (73 kg)   17 161 lb (73 kg)              We Performed the Following     HPV High Risk Types DNA Cervical     Pap imaged thin layer screen with HPV - recommended age 30 - 65 years (select HPV order below)          Where to get your medicines      These medications were sent to [facility] - [address]  [address] [facility] [address]     Phone:  [phone]     vitamin D 2000 units tablet          Primary Care Provider Office Phone # Fax #    Gina Eli PA-C [phone]       [address] [facility] [address]  [facility] [address]        Equal Access to Services     HERNANDEZ OLEARY : Hadii aad ku hadasho Soomaali, waaxda luqadaha, qaybta kaalmada adeegyada, nadiya ahmadi. So [facility] [phone].    ATENCIÓN: Si habla español, tiene a kan disposición servicios gratuitos de asistencia lingüística. Llame al [phone].    We comply with applicable federal civil rights laws and [place] laws. We do not discriminate on the basis of race, color, national origin, age, disability, sex, sexual orientation, or gender identity.            Thank you!     Thank you for choosing [facility]  for your care. Our goal is always to provide you with excellent care. Hearing back from our patients is one way we can continue  to improve our services. Please take a few minutes to complete the written survey that you may receive in the mail after your visit with us. Thank you!             Your Updated Medication List - Protect others around you: Learn how to safely use, store and throw away your medicines at www.disposemymeds.org.          This list is accurate as of 5/7/18  3:42 PM.  Always use your most recent med list.                   Brand Name Dispense Instructions for use Diagnosis    acetaminophen 325 MG tablet    TYLENOL    100 tablet    Take 2 tablets (650 mg) by mouth every 4 hours as needed for mild pain    Muscle spasticity       KASSANDRA REID GREASELESS cream     113 g    Apply topically every 6 hours as needed    Chronic pain of both shoulders       botulinum toxin type A 100 units injection    BOTOX    400 Units    Inject 400 Units into the muscle every 3 months    Other muscle spasm       fluticasone 50 MCG/ACT spray    FLONASE    3 Bottle    Spray 1 spray into both nostrils 2 times daily    Chronic rhinitis       labetalol 100 MG tablet    NORMODYNE    180 tablet    Take 1 tablet (100 mg) by mouth 2 times daily    Essential hypertension       levothyroxine 100 MCG tablet    SYNTHROID/LEVOTHROID    90 tablet    Take 1 tablet (100 mcg) by mouth daily    Acquired hypothyroidism       order for DME     1 Device    Equipment being ordered: electric williams lift that can be operated by one person.Patient's Height is 5 feet 9 inches; weight as of 8/15/13 is 160 pounds    Paraplegia (H)       order for DME     1 Box    Equipment being ordered: gauze 4*4 pack of 100, refill prn    Surgical wound, non healing, sequela       order for DME     100 Units    Equipment being ordered: 4*4 gauzes for dressing change    Open wound       * order for DME     120 pad    Equipment being ordered: Chux    Paraplegia (H), Continuous leakage(788.37)       * order for DME     1 each    BP cuff, brand as covered by insurance.  Dx: HTN    Essential  hypertension       * order for DME     1 each    Equipment being ordered: motorized wheelchair    Paraplegia (H)       order for DME     90 Units    Equipment being ordered:  adult diapers for urinary incontinence - Fax to [employer] [phone]    Paraplegia (H), Neurogenic bladder       vitamin D 2000 units tablet     90 tablet    Take 2,000 Units by mouth daily    Vitamin D deficiency disease       * Notice:  This list has 3 medication(s) that are the same as other medications prescribed for you. Read the directions carefully, and ask your doctor or other care provider to review them with you.

## 2018-05-10 LAB
COPATH REPORT: NORMAL
PAP: NORMAL

## 2018-05-11 LAB
FINAL DIAGNOSIS: NORMAL
HPV HR 12 DNA CVX QL NAA+PROBE: NEGATIVE
HPV16 DNA SPEC QL NAA+PROBE: NEGATIVE
HPV18 DNA SPEC QL NAA+PROBE: NEGATIVE
SPECIMEN DESCRIPTION: NORMAL
SPECIMEN SOURCE CVX/VAG CYTO: NORMAL

## 2018-05-22 ENCOUNTER — TELEPHONE (OUTPATIENT)
Dept: FAMILY MEDICINE | Facility: CLINIC | Age: 33
End: 2018-05-22
Payer: COMMERCIAL

## 2018-05-22 DIAGNOSIS — N31.9 NEUROGENIC BLADDER: ICD-10-CM

## 2018-05-22 DIAGNOSIS — G82.20 PARAPLEGIA (H): ICD-10-CM

## 2018-05-22 DIAGNOSIS — N39.45 CONTINUOUS LEAKAGE(788.37): ICD-10-CM

## 2018-05-22 NOTE — TELEPHONE ENCOUNTER
Reason for Call:  Other prescription    Detailed comments: needs a year authorization for under pay, uses 200 per month.  Said not getting filled and out of them.  [facility]ru [facility]   [phone]    Phone Number Patient can be reached at: Other phone number: [phone] [place]    Best Time: ASAP out of pad    Can we leave a detailed message on this number? YES    Call taken on 5/22/2018 at 10:47 AM by NO MOLINA

## 2018-05-22 NOTE — TELEPHONE ENCOUNTER
Gloves, chux & pull ups      Last Written Prescription Date:  3/22/2017  Last Fill Quantity: 150,   # refills: 5  Last Office Visit: 5/7/2018  Future Office visit:       Routing refill request to provider for review/approval because:  Not on protocol list

## 2018-05-22 NOTE — TELEPHONE ENCOUNTER
They need:    DME disposible pull ups  150/mo  DME Chux pads 200/mo  DME disposable gloves 4 mis/mo    To  [facility] fax [phone]

## 2018-05-23 ENCOUNTER — RECORDS - HEALTHEAST (OUTPATIENT)
Dept: ADMINISTRATIVE | Facility: OTHER | Age: 33
End: 2018-05-23

## 2018-05-23 ENCOUNTER — HOSPITAL ENCOUNTER (OUTPATIENT)
Dept: OCCUPATIONAL THERAPY | Facility: CLINIC | Age: 33
Setting detail: THERAPIES SERIES
End: 2018-05-23
Attending: PHYSICIAN ASSISTANT
Payer: COMMERCIAL

## 2018-05-23 PROCEDURE — 40000132 ZZH STATISTIC OT SEATING/WHEELED MOBILITY VISIT: Performed by: OCCUPATIONAL THERAPIST

## 2018-05-23 PROCEDURE — 97542 WHEELCHAIR MNGMENT TRAINING: CPT | Mod: GO | Performed by: OCCUPATIONAL THERAPIST
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
## 2018-07-02 ENCOUNTER — OFFICE VISIT (OUTPATIENT)
Dept: FAMILY MEDICINE | Facility: CLINIC | Age: 33
End: 2018-07-02
Payer: COMMERCIAL

## 2018-07-02 VITALS
SYSTOLIC BLOOD PRESSURE: 130 MMHG | DIASTOLIC BLOOD PRESSURE: 86 MMHG | HEART RATE: 77 BPM | RESPIRATION RATE: 16 BRPM | OXYGEN SATURATION: 98 % | BODY MASS INDEX: 25.79 KG/M2 | WEIGHT: 155 LBS

## 2018-07-02 DIAGNOSIS — G82.20 PARAPLEGIA (H): ICD-10-CM

## 2018-07-02 DIAGNOSIS — E03.9 ACQUIRED HYPOTHYROIDISM: Primary | ICD-10-CM

## 2018-07-02 DIAGNOSIS — K59.09 OTHER CONSTIPATION: ICD-10-CM

## 2018-07-02 PROCEDURE — 99214 OFFICE O/P EST MOD 30 MIN: CPT | Performed by: PHYSICIAN ASSISTANT

## 2018-07-02 RX ORDER — LEVOTHYROXINE SODIUM 100 UG/1
100 TABLET ORAL DAILY
Qty: 90 TABLET | Refills: 3 | Status: SHIPPED | OUTPATIENT
Start: 2018-07-02 | End: 2019-07-29

## 2018-07-02 RX ORDER — BISACODYL 10 MG
10 SUPPOSITORY, RECTAL RECTAL DAILY PRN
Qty: 30 SUPPOSITORY | Refills: 11 | Status: SHIPPED | OUTPATIENT
Start: 2018-07-02 | End: 2018-08-01

## 2018-07-02 NOTE — MR AVS SNAPSHOT
After Visit Summary   7/2/2018    Gloria Bang    MRN: [id number]           Patient Information     Date Of Birth          1985        Visit Information        Provider Department      7/2/2018 11:30 AM Gina Eli PA-C; [employer] [facility]        Today's Diagnoses     Acquired hypothyroidism    -  1    Other constipation        Paraplegia (H)           Follow-ups after your visit        Your next 10 appointments already scheduled     Jul 18, 2018  9:00 AM CDT   (Arrive by 8:45 AM)   Return Botox with Elio Dejesus MD   [facility] ([facility])    [address] [place] [id number] [phone]            Oct 17, 2018  9:40 AM CDT   (Arrive by 9:25 AM)   Return Botox with Elio Dejesus MD   [facility] ([facility])    [address] [place] [address] [id number] [phone]              Who to contact     If you have questions or need follow up information about today's clinic visit or your schedule please contact [facility] directly at [phone].  Normal or non-critical lab and imaging results will be communicated to you by MyChart, letter or phone within 4 business days after the clinic has received the results. If you do not hear from us within 7 days, please contact the clinic through MyChart or phone. If you have a critical or abnormal lab result, we will notify you by phone as soon as possible.  Submit refill requests through [employer] or call your pharmacy and they will forward the refill request to us. Please allow 3 business days for your refill to be completed.          Additional Information About Your Visit        Care EveryWhere ID     This is your Care EveryWhere ID. This could be used by other organizations to access your  [place] medical records  [id number]        Your Vitals Were     Pulse Respirations Pulse Oximetry BMI (Body Mass Index)          77 16 98% 25.79 kg/m2         Blood Pressure from Last 3 Encounters:   07/02/18 130/86   05/07/18 138/80   04/25/18 158/74    Weight from Last 3 Encounters:   07/02/18 155 lb (70.3 kg)   08/23/17 160 lb 15 oz (73 kg)   07/26/17 161 lb (73 kg)              Today, you had the following     No orders found for display         Today's Medication Changes          These changes are accurate as of 7/2/18 11:58 AM.  If you have any questions, ask your nurse or doctor.               Start taking these medicines.        Dose/Directions    bisacodyl 10 MG Suppository   Commonly known as:  DULCOLAX   Used for:  Other constipation   Started by:  Gina Eli PA-C        Dose:  10 mg   Place 1 suppository (10 mg) rectally daily as needed   Quantity:  30 suppository   Refills:  11       order for DME   Used for:  Paraplegia (H)   Started by:  Gina Eli PA-C        Equipment being ordered: long-handled grasping assist device   Quantity:  1 each   Refills:  0            Where to get your medicines      These medications were sent to [facility] [address] [address]     Phone:  [phone]     bisacodyl 10 MG Suppository    levothyroxine 100 MCG tablet         Some of these will need a paper prescription and others can be bought over the counter.  Ask your nurse if you have questions.     Bring a paper prescription for each of these medications     order for DME                Primary Care Provider Office Phone # Fax #    Gina Eli PA-C [phone]       [address] [address]        Equal Access to Services     HERNANDEZ OLEARY : Nicole Yun, collin rodriguez, nadiya silveira. So [facility] [phone].    ATENCIÓN: Alise montero  español, tiene a kan disposición servicios gratuitos de asistencia lingüística. Carmina mccall [phone].    We comply with applicable federal civil rights laws and [place] laws. We do not discriminate on the basis of race, color, national origin, age, disability, sex, sexual orientation, or gender identity.            Thank you!     Thank you for choosing [facility]  for your care. Our goal is always to provide you with excellent care. Hearing back from our patients is one way we can continue to improve our services. Please take a few minutes to complete the written survey that you may receive in the mail after your visit with us. Thank you!             Your Updated Medication List - Protect others around you: Learn how to safely use, store and throw away your medicines at www.disposemymeds.org.          This list is accurate as of 7/2/18 11:58 AM.  Always use your most recent med list.                   Brand Name Dispense Instructions for use Diagnosis    acetaminophen 325 MG tablet    TYLENOL    100 tablet    Take 2 tablets (650 mg) by mouth every 4 hours as needed for mild pain    Muscle spasticity       KASSANDRA REID GREASELESS cream     113 g    Apply topically every 6 hours as needed    Chronic pain of both shoulders       bisacodyl 10 MG Suppository    DULCOLAX    30 suppository    Place 1 suppository (10 mg) rectally daily as needed    Other constipation       botulinum toxin type A 100 units injection    BOTOX    400 Units    Inject 400 Units into the muscle every 3 months    Other muscle spasm       fluticasone 50 MCG/ACT spray    FLONASE    3 Bottle    Spray 1 spray into both nostrils 2 times daily    Chronic rhinitis       labetalol 100 MG tablet    NORMODYNE    180 tablet    Take 1 tablet (100 mg) by mouth 2 times daily    Essential hypertension       levothyroxine 100 MCG tablet    SYNTHROID/LEVOTHROID    90 tablet    Take 1 tablet (100 mcg) by mouth daily    Acquired  hypothyroidism       order for DME     1 Device    Equipment being ordered: electric williams lift that can be operated by one person.Patient's Height is 5 feet 9 inches; weight as of 8/15/13 is 160 pounds    Paraplegia (H)       order for DME     1 Box    Equipment being ordered: gauze 4*4 pack of 100, refill prn    Surgical wound, non healing, sequela       order for DME     100 Units    Equipment being ordered: 4*4 gauzes for dressing change    Open wound       * order for DME     1 each    BP cuff, brand as covered by insurance.  Dx: HTN    Essential hypertension       * order for DME     1 each    Equipment being ordered: motorized wheelchair    Paraplegia (H)       order for DME     200 pad    Equipment being ordered: Chux    Paraplegia (H), Continuous leakage(788.37)       order for DME     150 Units    Equipment being ordered:  adult diapers for urinary incontinence - Fax to [employer] [phone]    Paraplegia (H), Neurogenic bladder       order for DME     4 Box    Equipment being ordered: {generic gloves #100 with 5 refills    Paraplegia (H), Neurogenic bladder, Continuous leakage(788.37)       order for DME     1 each    Equipment being ordered: long-handled grasping assist device    Paraplegia (H)       vitamin D 2000 units tablet     90 tablet    Take 2,000 Units by mouth daily    Vitamin D deficiency disease       * Notice:  This list has 2 medication(s) that are the same as other medications prescribed for you. Read the directions carefully, and ask your doctor or other care provider to review them with you.
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
## 2018-07-18 ENCOUNTER — OFFICE VISIT (OUTPATIENT)
Dept: PHYSICAL MEDICINE AND REHAB | Facility: CLINIC | Age: 33
End: 2018-07-18
Payer: COMMERCIAL

## 2018-07-18 VITALS
SYSTOLIC BLOOD PRESSURE: 163 MMHG | BODY MASS INDEX: 25.83 KG/M2 | WEIGHT: 155 LBS | HEART RATE: 74 BPM | DIASTOLIC BLOOD PRESSURE: 74 MMHG | HEIGHT: 65 IN

## 2018-07-18 DIAGNOSIS — M62.838 MUSCLE SPASTICITY: Primary | ICD-10-CM

## 2018-07-18 DIAGNOSIS — G82.20 PARAPLEGIA (H): ICD-10-CM

## 2018-07-18 ASSESSMENT — PAIN SCALES - GENERAL: PAINLEVEL: NO PAIN (0)

## 2018-07-18 NOTE — MR AVS SNAPSHOT
"              After Visit Summary   7/18/2018    Gloria Bang    MRN: [id number]           Patient Information     Date Of Birth          1985        Visit Information        Provider Department      7/18/2018 8:45 AM Elio Dejesus MD; [employer] [facility] Physical Medicine and Rehabilitation         Follow-ups after your visit        Your next 10 appointments already scheduled     Nov 01, 2018  2:20 PM CDT   (Arrive by 2:05 PM)   Return Botox with Elio Dejesus MD   [facility] Physical Medicine and Rehabilitation ([facility] [place])    [address] [address] [phone]              Who to contact     Please call your clinic at [phone] to:    Ask questions about your health    Make or cancel appointments    Discuss your medicines    Learn about your test results    Speak to your doctor            Additional Information About Your Visit        Care EveryWhere ID     This is your Care EveryWhere ID. This could be used by other organizations to access your [place] medical records  [id number]        Your Vitals Were     Pulse Height BMI (Body Mass Index)             74 1.651 m (5' 5\") 25.79 kg/m2          Blood Pressure from Last 3 Encounters:   07/18/18 163/74   07/02/18 130/86   05/07/18 138/80    Weight from Last 3 Encounters:   07/18/18 70.3 kg (155 lb)   07/02/18 70.3 kg (155 lb)   08/23/17 73 kg (160 lb 15 oz)              Today, you had the following     No orders found for display       Primary Care Provider Office Phone # Fax #    Gina Eli PA-C [phone]       [address] [address]        Equal Access to Services     [facility] : Hadii dariela Yun, collin rodriguez, qalatrice leivaalnadiya duval. So [facility] [phone].    ATENCIÓN: Si habla español, tiene a kan disposición servicios gratuitos de asistencia lingüística. Llame " al [phone].    We comply with applicable federal civil rights laws and [place] laws. We do not discriminate on the basis of race, color, national origin, age, disability, sex, sexual orientation, or gender identity.            Thank you!     Thank you for choosing [facility] PHYSICAL MEDICINE AND REHABILITATION  for your care. Our goal is always to provide you with excellent care. Hearing back from our patients is one way we can continue to improve our services. Please take a few minutes to complete the written survey that you may receive in the mail after your visit with us. Thank you!             Your Updated Medication List - Protect others around you: Learn how to safely use, store and throw away your medicines at www.disposemymeds.org.          This list is accurate as of 7/18/18  9:24 AM.  Always use your most recent med list.                   Brand Name Dispense Instructions for use Diagnosis    acetaminophen 325 MG tablet    TYLENOL    100 tablet    Take 2 tablets (650 mg) by mouth every 4 hours as needed for mild pain    Muscle spasticity       KASSANDRA REID GREASELESS cream     113 g    Apply topically every 6 hours as needed    Chronic pain of both shoulders       bisacodyl 10 MG Suppository    DULCOLAX    30 suppository    Place 1 suppository (10 mg) rectally daily as needed    Other constipation       botulinum toxin type A 100 units injection    BOTOX    400 Units    Inject 400 Units into the muscle every 3 months    Other muscle spasm       fluticasone 50 MCG/ACT spray    FLONASE    3 Bottle    Spray 1 spray into both nostrils 2 times daily    Chronic rhinitis       labetalol 100 MG tablet    NORMODYNE    180 tablet    Take 1 tablet (100 mg) by mouth 2 times daily    Essential hypertension       levothyroxine 100 MCG tablet    SYNTHROID/LEVOTHROID    90 tablet    Take 1 tablet (100 mcg) by mouth daily    Acquired hypothyroidism       order for DME     1 Device    Equipment being ordered: electric  williams lift that can be operated by one person.Patient's Height is 5 feet 9 inches; weight as of 8/15/13 is 160 pounds    Paraplegia (H)       order for DME     1 Box    Equipment being ordered: gauze 4*4 pack of 100, refill prn    Surgical wound, non healing, sequela       order for DME     100 Units    Equipment being ordered: 4*4 gauzes for dressing change    Open wound       * order for DME     1 each    BP cuff, brand as covered by insurance.  Dx: HTN    Essential hypertension       * order for DME     1 each    Equipment being ordered: motorized wheelchair    Paraplegia (H)       order for DME     200 pad    Equipment being ordered: Chux    Paraplegia (H), Continuous leakage(788.37)       order for DME     150 Units    Equipment being ordered:  adult diapers for urinary incontinence - Fax to [employer] [phone]    Paraplegia (H), Neurogenic bladder       order for DME     4 Box    Equipment being ordered: {generic gloves #100 with 5 refills    Paraplegia (H), Neurogenic bladder, Continuous leakage(788.37)       order for DME     1 each    Equipment being ordered: long-handled grasping assist device    Paraplegia (H)       vitamin D 2000 units tablet     90 tablet    Take 2,000 Units by mouth daily    Vitamin D deficiency disease       * Notice:  This list has 2 medication(s) that are the same as other medications prescribed for you. Read the directions carefully, and ask your doctor or other care provider to review them with you.

## 2018-07-18 NOTE — LETTER
"7/18/2018       RE: Gloria Bang  [address]  [place] [address]     Dear Colleague,    Thank you for referring your patient, Gloria Bang, to the [facility]ICINE [facility]. Please see a copy of my visit note below.    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 She is accompanied by her brother and [facility]  today. I last saw her 3/28/2018 which time he utilized 400 units of Botox divided evenly between the right and left leg adductors and hamstrings. She reports this definitely helped with the spasms that she has in her legs.  She reports about two weeks ago her spasms have increased some again. She is pleased with the results.  Does describe similar paresthesia feeling cold at times. Same issue as in the past reported not any different. She is overall feeling healthy and well today with no new interval medical issues. Denies chance of pregnancy. No recent immunizations.    Physical exam:  /74  Pulse 74  Ht 5' 5\" (1.651 m)  Wt 155 lb (70.3 kg)  BMI 25.79 kg/m2   Gloria is alert pleasant no diaphoresis. We use a [employer]  for communication. She arrives with a power wheelchair with right side joystick control and tilt capacity. Joystick has some duct tape holding in place with reported recent break bumping into object.  Involuntary spasms are noted in her bilateral lower extremities. These exacerbate with attempted passive range of motion. Today more extension synergy pattern though some flexion. In past more often flexion predominant synergy pattern.   Modified Lorin 3/4 in both legs above the knee, 4/4 versus fixed contracture in the bilateral ankles and toes with plantar flexion inversion positioning (unchanged since " prior visits).  Overlying skin in the thighs hamstrings is healthy.    Assessment and recommendations:  1. Gloria has severe spasticity in her lower extremities related to her spinal cord injury and has benefited from chemodenervation with botulinum toxin in the past.  We will repeat these injections today. Of note the activity in the adductors was relatively lower and shifted dose some from adductor to hamstrings today.   2. She should continue with positioning and stretching exercises.  3. Follow-up in 3 months time, will call sooner if any side effects or other concerns arise.       Procedure: Chemodenervation to bilateral lower extremities utilizing onabotulinum toxin type A, Botox. I reviewed at length the risks benefits and potential time course for onset and duration. She agrees and signed the consent form today as does the . Family members present also in agreement as well. Total therapeutic units of Botox lot -J5 expiration 10/2020 was utilized. NDC #9684-0390-23. Diluted with preservative-free normal saline ratio of 100 units per milliliter. This was divided into 2 syringes. 27-gauge recording EMG needle was utilized in conjunction with EMG guidance to identify the most active motor units while avoiding surrounding structures. Today stayed in her power w/c and with help of brother were able to access targeted muscles. All areas were cleansed with ChloraPrep. The following muscles were then injected    Left le. Adductor muscle group, 75 units divided over 3 sites  2. Hamstring, 125 units divided over 3 sites    Right le. Adductor muscle group, 75 units divided over 3 sites  2. Hamstring, 125 units divided over 3 sites.    Given in procedure less activity in adductors, might plan to further shift ratio next visit to 50 adductors, 150 HS.  Besides some involuntary spasms that occur during the needle procedure, she tolerated very well.    Again, thank you for allowing me to  participate in the care of your patient.      Sincerely,    Elio Dejesus MD

## 2018-07-18 NOTE — PROGRESS NOTES
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 She is accompanied by her brother and [facility]  today. I last saw her 3/28/2018 which time he utilized 400 units of Botox divided evenly between the right and left leg adductors and hamstrings. She reports this definitely helped with the spasms that she has in her legs.  She reports about two weeks ago her spasms have increased some again. She is pleased with the results.  Does describe similar paresthesia feeling cold at times. Same issue as in the past reported not any different. She is overall feeling healthy and well today with no new interval medical issues. Denies chance of pregnancy. No recent immunizations.    Physical exam:  /74  Pulse 74  Ht 5' 5\" (1.651 m)  Wt 155 lb (70.3 kg)  BMI 25.79 kg/m2   Gloria is alert pleasant no diaphoresis. We use a [facility]  for communication. She arrives with a power wheelchair with right side joystick control and tilt capacity. Joystick has some duct tape holding in place with reported recent break bumping into object.  Involuntary spasms are noted in her bilateral lower extremities. These exacerbate with attempted passive range of motion. Today more extension synergy pattern though some flexion. In past more often flexion predominant synergy pattern.   Modified Lorin 3/4 in both legs above the knee, 4/4 versus fixed contracture in the bilateral ankles and toes with plantar flexion inversion positioning (unchanged since prior visits).  Overlying skin in the thighs hamstrings is healthy.    Assessment and recommendations:  1. Gloria has severe spasticity in her lower extremities related to her spinal cord injury and has benefited from chemodenervation with botulinum toxin in the past.  We will repeat these injections today. Of note " the activity in the adductors was relatively lower and shifted dose some from adductor to hamstrings today.   2. She should continue with positioning and stretching exercises.  3. Follow-up in 3 months time, will call sooner if any side effects or other concerns arise.       Procedure: Chemodenervation to bilateral lower extremities utilizing onabotulinum toxin type A, Botox. I reviewed at length the risks benefits and potential time course for onset and duration. She agrees and signed the consent form today as does the . Family members present also in agreement as well. Total therapeutic units of Botox lot -F3 expiration 10/2020 was utilized. NDC #4573-5486-63. Diluted with preservative-free normal saline ratio of 100 units per milliliter. This was divided into 2 syringes. 27-gauge recording EMG needle was utilized in conjunction with EMG guidance to identify the most active motor units while avoiding surrounding structures. Today stayed in her power w/c and with help of brother were able to access targeted muscles. All areas were cleansed with ChloraPrep. The following muscles were then injected    Left le. Adductor muscle group, 75 units divided over 3 sites  2. Hamstring, 125 units divided over 3 sites    Right le. Adductor muscle group, 75 units divided over 3 sites  2. Hamstring, 125 units divided over 3 sites.    Given in procedure less activity in adductors, might plan to further shift ratio next visit to 50 adductors, 150 HS.  Besides some involuntary spasms that occur during the needle procedure, she tolerated very well.

## 2018-08-01 ENCOUNTER — OFFICE VISIT (OUTPATIENT)
Dept: FAMILY MEDICINE | Facility: CLINIC | Age: 33
End: 2018-08-01
Payer: COMMERCIAL

## 2018-08-01 VITALS
HEART RATE: 72 BPM | OXYGEN SATURATION: 100 % | SYSTOLIC BLOOD PRESSURE: 158 MMHG | DIASTOLIC BLOOD PRESSURE: 88 MMHG | TEMPERATURE: 97.3 F | RESPIRATION RATE: 16 BRPM

## 2018-08-01 DIAGNOSIS — E03.9 ACQUIRED HYPOTHYROIDISM: ICD-10-CM

## 2018-08-01 DIAGNOSIS — J31.0 CHRONIC RHINITIS, UNSPECIFIED TYPE: ICD-10-CM

## 2018-08-01 DIAGNOSIS — M25.50 ARTHRALGIA, UNSPECIFIED JOINT: ICD-10-CM

## 2018-08-01 DIAGNOSIS — K59.09 OTHER CONSTIPATION: ICD-10-CM

## 2018-08-01 DIAGNOSIS — I10 ESSENTIAL HYPERTENSION: ICD-10-CM

## 2018-08-01 DIAGNOSIS — N91.2 AMENORRHEA: Primary | ICD-10-CM

## 2018-08-01 LAB
BASOPHILS # BLD AUTO: 0 10E9/L (ref 0–0.2)
BASOPHILS NFR BLD AUTO: 0.3 %
DIFFERENTIAL METHOD BLD: ABNORMAL
EOSINOPHIL # BLD AUTO: 0.5 10E9/L (ref 0–0.7)
EOSINOPHIL NFR BLD AUTO: 4.6 %
ERYTHROCYTE [DISTWIDTH] IN BLOOD BY AUTOMATED COUNT: 15.6 % (ref 10–15)
ERYTHROCYTE [SEDIMENTATION RATE] IN BLOOD BY WESTERGREN METHOD: 18 MM/H (ref 0–20)
HCT VFR BLD AUTO: 35.2 % (ref 35–47)
HGB BLD-MCNC: 11.2 G/DL (ref 11.7–15.7)
LYMPHOCYTES # BLD AUTO: 2.6 10E9/L (ref 0.8–5.3)
LYMPHOCYTES NFR BLD AUTO: 22.5 %
MCH RBC QN AUTO: 26.5 PG (ref 26.5–33)
MCHC RBC AUTO-ENTMCNC: 31.8 G/DL (ref 31.5–36.5)
MCV RBC AUTO: 83 FL (ref 78–100)
MONOCYTES # BLD AUTO: 1 10E9/L (ref 0–1.3)
MONOCYTES NFR BLD AUTO: 8.5 %
NEUTROPHILS # BLD AUTO: 7.5 10E9/L (ref 1.6–8.3)
NEUTROPHILS NFR BLD AUTO: 64.1 %
PLATELET # BLD AUTO: 277 10E9/L (ref 150–450)
RBC # BLD AUTO: 4.23 10E12/L (ref 3.8–5.2)
WBC # BLD AUTO: 11.7 10E9/L (ref 4–11)

## 2018-08-01 PROCEDURE — 85652 RBC SED RATE AUTOMATED: CPT | Performed by: FAMILY MEDICINE

## 2018-08-01 PROCEDURE — 85025 COMPLETE CBC W/AUTO DIFF WBC: CPT | Performed by: FAMILY MEDICINE

## 2018-08-01 PROCEDURE — 36415 COLL VENOUS BLD VENIPUNCTURE: CPT | Performed by: FAMILY MEDICINE

## 2018-08-01 PROCEDURE — 82565 ASSAY OF CREATININE: CPT | Performed by: FAMILY MEDICINE

## 2018-08-01 PROCEDURE — 99214 OFFICE O/P EST MOD 30 MIN: CPT | Performed by: FAMILY MEDICINE

## 2018-08-01 RX ORDER — FLUTICASONE PROPIONATE 50 MCG
1-2 SPRAY, SUSPENSION (ML) NASAL DAILY
Qty: 1 BOTTLE | Refills: 2 | Status: SHIPPED | OUTPATIENT
Start: 2018-08-01 | End: 2018-10-29

## 2018-08-01 RX ORDER — BISACODYL 10 MG
10 SUPPOSITORY, RECTAL RECTAL DAILY PRN
Qty: 30 SUPPOSITORY | Refills: 2 | Status: SHIPPED | OUTPATIENT
Start: 2018-08-01 | End: 2019-11-04

## 2018-08-01 NOTE — MR AVS SNAPSHOT
After Visit Summary   8/1/2018    Gloria Bang    MRN: [id number]           Patient Information     Date Of Birth          1985        Visit Information        Provider Department      8/1/2018 12:40 PM Lizy Myles MD; [employer] [facility]        Today's Diagnoses     Amenorrhea    -  1    Arthralgia, unspecified joint        Chronic rhinitis, unspecified type        Acquired hypothyroidism        Other constipation          Care Instructions      Restart Flonase, as discussed.    Follow-up if worsening symptoms or if lymphadenopathy (sense of throat swelling/pain) does not resolve within 2 weeks of restarting Flonase, as discussed.      We will notify you of your lab results, as discussed.    Tylenol (Acetaminophen) as needed for pain, only as discussed.    Do not exceed Tylenol (Acetaminophen) 2000 mg from all sources in a 24-hour period.    Follow-up if further concerns regarding joint pain, as discussed.      Continue Dulcolax suppositories for constipation, only as directed.      Follow-up if worsening constipation, blood in stool, or concerns, as discussed.            Follow-ups after your visit        Your next 10 appointments already scheduled     Nov 01, 2018  2:20 PM CDT   (Arrive by 2:05 PM)   Return Botox with Elio Dejesus MD   [facility] ([facility] [place])    [address] [place] [id number] [phone]              Who to contact     If you have questions or need follow up information about today's clinic visit or your schedule please contact [facility] directly at [phone].  Normal or non-critical lab and imaging results will be communicated to you by MyChart, letter or phone within 4 business days after the clinic has received the results. If you do not hear from us within 7 days,  please contact the clinic through Look[employer] or phone. If you have a critical or abnormal lab result, we will notify you by phone as soon as possible.  Submit refill requests through Look[employer] or call your pharmacy and they will forward the refill request to us. Please allow 3 business days for your refill to be completed.          Additional Information About Your Visit        Care EveryWhere ID     This is your Care EveryWhere ID. This could be used by other organizations to access your [place] medical records  [id number]        Your Vitals Were     Pulse Temperature Respirations Last Period Pulse Oximetry       72 97.3  F (36.3  C) (Tympanic) 16 03/01/2018 100%        Blood Pressure from Last 3 Encounters:   08/01/18 158/88   07/18/18 163/74   07/02/18 130/86    Weight from Last 3 Encounters:   07/18/18 155 lb (70.3 kg)   07/02/18 155 lb (70.3 kg)   08/23/17 160 lb 15 oz (73 kg)              We Performed the Following     CBC with platelets differential     Erythrocyte sedimentation rate auto          Today's Medication Changes          These changes are accurate as of 8/1/18  1:27 PM.  If you have any questions, ask your nurse or doctor.               These medicines have changed or have updated prescriptions.        Dose/Directions    fluticasone 50 MCG/ACT spray   Commonly known as:  FLONASE   This may have changed:    - how much to take  - when to take this   Used for:  Chronic rhinitis, unspecified type   Changed by:  Lizy Myles MD        Dose:  1-2 spray   Spray 1-2 sprays into both nostrils daily   Quantity:  1 Bottle   Refills:  2            Where to get your medicines      Some of these will need a paper prescription and others can be bought over the counter.  Ask your nurse if you have questions.     Bring a paper prescription for each of these medications     bisacodyl 10 MG Suppository    fluticasone 50 MCG/ACT spray                Primary Care Provider Office Phone # Fax #     Gina Eli PA-C [phone]       [address] [address]        Equal Access to Services     [facility] : Nicole Yun, collin rodriguez, tahirlatrice leivadamikortney joysandrakortney, waxsalbador douglasin hayaadebbie joytito alexandriayoliabimael ahmadi. So [facility] [phone].    ATENCIÓN: Si habla español, tiene a kan disposición servicios gratuitos de asistencia lingüística. Llame al [phone].    We comply with applicable federal civil rights laws and [place] laws. We do not discriminate on the basis of race, color, national origin, age, disability, sex, sexual orientation, or gender identity.            Thank you!     Thank you for choosing [facility]  for your care. Our goal is always to provide you with excellent care. Hearing back from our patients is one way we can continue to improve our services. Please take a few minutes to complete the written survey that you may receive in the mail after your visit with us. Thank you!             Your Updated Medication List - Protect others around you: Learn how to safely use, store and throw away your medicines at www.disposemymeds.org.          This list is accurate as of 8/1/18  1:27 PM.  Always use your most recent med list.                   Brand Name Dispense Instructions for use Diagnosis    acetaminophen 325 MG tablet    TYLENOL    100 tablet    Take 2 tablets (650 mg) by mouth every 4 hours as needed for mild pain    Muscle spasticity       KASSANDRA REID GREASELESS cream     113 g    Apply topically every 6 hours as needed    Chronic pain of both shoulders       bisacodyl 10 MG Suppository    DULCOLAX    30 suppository    Place 1 suppository (10 mg) rectally daily as needed    Other constipation       botulinum toxin type A 100 units injection    BOTOX    400 Units    Inject 400 Units into the muscle every 3 months    Other muscle spasm       fluticasone 50 MCG/ACT spray    FLONASE    1 Bottle    Spray 1-2 sprays  into both nostrils daily    Chronic rhinitis, unspecified type       labetalol 100 MG tablet    NORMODYNE    180 tablet    Take 1 tablet (100 mg) by mouth 2 times daily    Essential hypertension       levothyroxine 100 MCG tablet    SYNTHROID/LEVOTHROID    90 tablet    Take 1 tablet (100 mcg) by mouth daily    Acquired hypothyroidism       order for DME     1 Device    Equipment being ordered: electric williams lift that can be operated by one person.Patient's Height is 5 feet 9 inches; weight as of 8/15/13 is 160 pounds    Paraplegia (H)       order for DME     1 Box    Equipment being ordered: gauze 4*4 pack of 100, refill prn    Surgical wound, non healing, sequela       order for DME     100 Units    Equipment being ordered: 4*4 gauzes for dressing change    Open wound       * order for DME     1 each    BP cuff, brand as covered by insurance.  Dx: HTN    Essential hypertension       * order for DME     1 each    Equipment being ordered: motorized wheelchair    Paraplegia (H)       order for DME     200 pad    Equipment being ordered: Chux    Paraplegia (H), Continuous leakage(788.37)       order for DME     150 Units    Equipment being ordered:  adult diapers for urinary incontinence - Fax to [employer] [phone]    Paraplegia (H), Neurogenic bladder       order for DME     4 Box    Equipment being ordered: {generic gloves #100 with 5 refills    Paraplegia (H), Neurogenic bladder, Continuous leakage(788.37)       order for DME     1 each    Equipment being ordered: long-handled grasping assist device    Paraplegia (H)       vitamin D 2000 units tablet     90 tablet    Take 2,000 Units by mouth daily    Vitamin D deficiency disease       * Notice:  This list has 2 medication(s) that are the same as other medications prescribed for you. Read the directions carefully, and ask your doctor or other care provider to review them with you.
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
## 2018-08-01 NOTE — PATIENT INSTRUCTIONS
Restart Flonase, as discussed.    Follow-up if worsening symptoms or if lymphadenopathy (sense of throat swelling/pain) does not resolve within 2 weeks of restarting Flonase, as discussed.      We will notify you of your lab results, as discussed.    Tylenol (Acetaminophen) as needed for pain, only as discussed.    Do not exceed Tylenol (Acetaminophen) 2000 mg from all sources in a 24-hour period.    Follow-up if further concerns regarding joint pain, as discussed.      Continue Dulcolax suppositories for constipation, only as directed.      Follow-up if worsening constipation, blood in stool, or concerns, as discussed.      Follow up if blood pressure is consistently > 140/90.

## 2018-08-02 LAB
CREAT SERPL-MCNC: 0.46 MG/DL (ref 0.52–1.04)
GFR SERPL CREATININE-BSD FRML MDRD: >90 ML/MIN/1.7M2

## 2018-09-17 NOTE — ADDENDUM NOTE
Encounter addended by: Lin Stephens OT on: 9/17/2018  4:58 PM<BR>     Actions taken: Sign clinical note
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
## 2018-10-03 ENCOUNTER — TELEPHONE (OUTPATIENT)
Dept: FAMILY MEDICINE | Facility: CLINIC | Age: 33
End: 2018-10-03

## 2018-10-03 NOTE — TELEPHONE ENCOUNTER
Our goal is to have forms completed within 72 hours, however some forms may require a visit or additional information.    What clinic location was the form placed at [facility] or [place].?     Who is the form from? [employer]  Where did the form come from? Faxed to clinic   The form was placed in the inbox of TONI Rodriguez      Please fax to [phone]    Additional comments: [employer]/power wheelchair rx    Call take on 10/3/2018 at 1:59 PM by Rena Harper

## 2018-10-29 ENCOUNTER — OFFICE VISIT (OUTPATIENT)
Dept: FAMILY MEDICINE | Facility: CLINIC | Age: 33
End: 2018-10-29
Payer: COMMERCIAL

## 2018-10-29 VITALS
OXYGEN SATURATION: 100 % | RESPIRATION RATE: 16 BRPM | TEMPERATURE: 99 F | BODY MASS INDEX: 25.96 KG/M2 | DIASTOLIC BLOOD PRESSURE: 85 MMHG | SYSTOLIC BLOOD PRESSURE: 164 MMHG | WEIGHT: 156 LBS | HEART RATE: 79 BPM

## 2018-10-29 DIAGNOSIS — J31.0 CHRONIC RHINITIS: ICD-10-CM

## 2018-10-29 DIAGNOSIS — E03.9 ACQUIRED HYPOTHYROIDISM: Primary | ICD-10-CM

## 2018-10-29 DIAGNOSIS — D64.9 ANEMIA, UNSPECIFIED TYPE: ICD-10-CM

## 2018-10-29 DIAGNOSIS — N31.9 NEUROGENIC BLADDER: ICD-10-CM

## 2018-10-29 DIAGNOSIS — G82.20 PARAPLEGIA (H): ICD-10-CM

## 2018-10-29 LAB
BASOPHILS # BLD AUTO: 0 10E9/L (ref 0–0.2)
BASOPHILS NFR BLD AUTO: 0.4 %
DIFFERENTIAL METHOD BLD: ABNORMAL
EOSINOPHIL # BLD AUTO: 0.4 10E9/L (ref 0–0.7)
EOSINOPHIL NFR BLD AUTO: 5.3 %
ERYTHROCYTE [DISTWIDTH] IN BLOOD BY AUTOMATED COUNT: 15.3 % (ref 10–15)
HCT VFR BLD AUTO: 34.4 % (ref 35–47)
HGB BLD-MCNC: 10.5 G/DL (ref 11.7–15.7)
LYMPHOCYTES # BLD AUTO: 2.8 10E9/L (ref 0.8–5.3)
LYMPHOCYTES NFR BLD AUTO: 34 %
MCH RBC QN AUTO: 24.8 PG (ref 26.5–33)
MCHC RBC AUTO-ENTMCNC: 30.5 G/DL (ref 31.5–36.5)
MCV RBC AUTO: 81 FL (ref 78–100)
MONOCYTES # BLD AUTO: 0.8 10E9/L (ref 0–1.3)
MONOCYTES NFR BLD AUTO: 10.1 %
NEUTROPHILS # BLD AUTO: 4.1 10E9/L (ref 1.6–8.3)
NEUTROPHILS NFR BLD AUTO: 50.2 %
PLATELET # BLD AUTO: 325 10E9/L (ref 150–450)
RBC # BLD AUTO: 4.24 10E12/L (ref 3.8–5.2)
WBC # BLD AUTO: 8.2 10E9/L (ref 4–11)

## 2018-10-29 PROCEDURE — 36415 COLL VENOUS BLD VENIPUNCTURE: CPT | Performed by: PHYSICIAN ASSISTANT

## 2018-10-29 PROCEDURE — 99214 OFFICE O/P EST MOD 30 MIN: CPT | Performed by: PHYSICIAN ASSISTANT

## 2018-10-29 PROCEDURE — 84443 ASSAY THYROID STIM HORMONE: CPT | Performed by: PHYSICIAN ASSISTANT

## 2018-10-29 PROCEDURE — 85025 COMPLETE CBC W/AUTO DIFF WBC: CPT | Performed by: PHYSICIAN ASSISTANT

## 2018-10-29 PROCEDURE — 82728 ASSAY OF FERRITIN: CPT | Performed by: PHYSICIAN ASSISTANT

## 2018-10-29 RX ORDER — FLUTICASONE PROPIONATE 50 MCG
1-2 SPRAY, SUSPENSION (ML) NASAL DAILY
Qty: 1 BOTTLE | Refills: 11 | Status: ON HOLD | OUTPATIENT
Start: 2018-10-29 | End: 2019-08-02

## 2018-10-29 RX ORDER — CETIRIZINE HYDROCHLORIDE 10 MG/1
10 TABLET ORAL EVERY EVENING
Qty: 30 TABLET | Refills: 11 | Status: SHIPPED | OUTPATIENT
Start: 2018-10-29 | End: 2019-07-29
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
## 2018-10-29 NOTE — MR AVS SNAPSHOT
After Visit Summary   10/29/2018    Teresita Bang    MRN: [id number]           Patient Information     Date Of Birth          1985        Visit Information        Provider Department      10/29/2018 1:25 PM Gina Eli PA-C; [employer] [facility]        Today's Diagnoses     Acquired hypothyroidism    -  1    Paraplegia (H)        Neurogenic bladder        Anemia, unspecified type        Chronic rhinitis           Follow-ups after your visit        Follow-up notes from your care team     Return in about 1 week (around 11/5/2018), or if symptoms worsen or fail to improve.      Your next 10 appointments already scheduled     Nov 01, 2018  2:20 PM CDT   (Arrive by 2:05 PM)   Return Botox with Elio Dejesus MD   [facility] Physical Medicine and Rehabilitation ([facility])    [address] [place] [id number] [phone]              Who to contact     If you have questions or need follow up information about today's clinic visit or your schedule please contact [facility] directly at [phone].  Normal or non-critical lab and imaging results will be communicated to you by Kaposthart, letter or phone within 4 business days after the clinic has received the results. If you do not hear from us within 7 days, please contact the clinic through Kaposthart or phone. If you have a critical or abnormal lab result, we will notify you by phone as soon as possible.  Submit refill requests through [email or web address] or call your pharmacy and they will forward the refill request to us. Please allow 3 business days for your refill to be completed.          Additional Information About Your Visit        MyChart Information     [email or web address] lets you send messages to your doctor, view your test results, renew your prescriptions, schedule appointments and more. To sign up, go to  [email or web address] . Click on \"Log in\" on the left side of the screen, which will take you to the Welcome page. Then click on \"Sign up Now\" on the right side of the page.     You will be asked to enter the access code listed below, as well as some personal information. Please follow the directions to create your username and password.     Your access code is: [email or web address]  Expires: 2019  6:30 AM     Your access code will  in 90 days. If you need help or a new code, please call your [place] clinic or [phone].        Care EveryWhere ID     This is your Care EveryWhere ID. This could be used by other organizations to access your [place] medical records  [id number]        Your Vitals Were     Pulse Temperature Respirations Pulse Oximetry BMI (Body Mass Index)       79 99  F (37.2  C) (Tympanic) 16 100% 25.96 kg/m2        Blood Pressure from Last 3 Encounters:   10/29/18 164/85   18 158/88   18 163/74    Weight from Last 3 Encounters:   10/29/18 156 lb (70.8 kg)   18 155 lb (70.3 kg)   18 155 lb (70.3 kg)              We Performed the Following     CBC with platelets differential     Ferritin     TSH with free T4 reflex          Today's Medication Changes          These changes are accurate as of 10/29/18  2:43 PM.  If you have any questions, ask your nurse or doctor.               Start taking these medicines.        Dose/Directions    cetirizine 10 MG tablet   Commonly known as:  zyrTEC   Used for:  Chronic rhinitis   Started by:  Gina Eli PA-C        Dose:  10 mg   Take 1 tablet (10 mg) by mouth every evening   Quantity:  30 tablet   Refills:  11       order for DME   Used for:  Paraplegia (H), Neurogenic bladder   Started by:  Gina Eli PA-C        Equipment being ordered:  adult diapers for urinary incontinence - Fax to [employer] [phone]   Quantity:  200 Units   Refills:  11            Where to get your medicines      These " medications were sent to [facility] - [place], [place] - [address]  [address] [facility], [facility] [address]     Phone:  [phone]     cetirizine 10 MG tablet    fluticasone 50 MCG/ACT spray         Some of these will need a paper prescription and others can be bought over the counter.  Ask your nurse if you have questions.     Bring a paper prescription for each of these medications     order for DME                Primary Care Provider Office Phone # Fax #    Gina Eli PA-C [phone]       [address] [facility] [address]  [facility] [address]        Equal Access to Services     HERNANDEZ OLEARY : Hadii dariela shanks hadasho Sorobel, waaxda luqadaha, qaybta kaalmada adeegyakortney, nadiya blackwell . So [facility] [phone].    ATENCIÓN: Si habla español, tiene a kan disposición servicios gratuitos de asistencia lingüística. [facility] [phone].    We comply with applicable federal civil rights laws and [place] laws. We do not discriminate on the basis of race, color, national origin, age, disability, sex, sexual orientation, or gender identity.            Thank you!     Thank you for choosing [facility]  for your care. Our goal is always to provide you with excellent care. Hearing back from our patients is one way we can continue to improve our services. Please take a few minutes to complete the written survey that you may receive in the mail after your visit with us. Thank you!             Your Updated Medication List - Protect others around you: Learn how to safely use, store and throw away your medicines at www.disposemymeds.org.          This list is accurate as of 10/29/18  2:43 PM.  Always use your most recent med list.                   Brand Name Dispense Instructions for use Diagnosis    acetaminophen 325 MG tablet    TYLENOL    100 tablet    Take 2 tablets (650 mg) by mouth every 4 hours as needed for mild pain    Muscle spasticity       KASSANDRA  REID GREASELESS cream     113 g    Apply topically every 6 hours as needed    Chronic pain of both shoulders       bisacodyl 10 MG Suppository    DULCOLAX    30 suppository    Place 1 suppository (10 mg) rectally daily as needed    Other constipation       botulinum toxin type A 100 units injection    BOTOX    400 Units    Inject 400 Units into the muscle every 3 months    Other muscle spasm       cetirizine 10 MG tablet    zyrTEC    30 tablet    Take 1 tablet (10 mg) by mouth every evening    Chronic rhinitis       fluticasone 50 MCG/ACT spray    FLONASE    1 Bottle    Spray 1-2 sprays into both nostrils daily    Chronic rhinitis       labetalol 100 MG tablet    NORMODYNE    180 tablet    Take 1 tablet (100 mg) by mouth 2 times daily    Essential hypertension       levothyroxine 100 MCG tablet    SYNTHROID/LEVOTHROID    90 tablet    Take 1 tablet (100 mcg) by mouth daily    Acquired hypothyroidism       order for DME     1 Device    Equipment being ordered: electric williams lift that can be operated by one person.Patient's Height is 5 feet 9 inches; weight as of 8/15/13 is 160 pounds    Paraplegia (H)       order for DME     1 Box    Equipment being ordered: gauze 4*4 pack of 100, refill prn    Surgical wound, non healing, sequela       order for DME     100 Units    Equipment being ordered: 4*4 gauzes for dressing change    Open wound       * order for DME     1 each    BP cuff, brand as covered by insurance.  Dx: HTN    Essential hypertension       * order for DME     1 each    Equipment being ordered: motorized wheelchair    Paraplegia (H)       order for DME     200 pad    Equipment being ordered: Chux    Paraplegia (H), Continuous leakage(788.37)       order for DME     4 Box    Equipment being ordered: {generic gloves #100 with 5 refills    Paraplegia (H), Neurogenic bladder, Continuous leakage(788.37)       order for DME     1 each    Equipment being ordered: long-handled grasping assist device    Paraplegia  (H)       order for DME     200 Units    Equipment being ordered:  adult diapers for urinary incontinence - Fax to [employer] [phone]    Paraplegia (H), Neurogenic bladder       vitamin D 2000 units tablet     90 tablet    Take 2,000 Units by mouth daily    Vitamin D deficiency disease       * Notice:  This list has 2 medication(s) that are the same as other medications prescribed for you. Read the directions carefully, and ask your doctor or other care provider to review them with you.

## 2018-10-30 LAB
FERRITIN SERPL-MCNC: 6 NG/ML (ref 12–150)
TSH SERPL DL<=0.005 MIU/L-ACNC: 2.27 MU/L (ref 0.4–4)

## 2018-10-31 ENCOUNTER — TELEPHONE (OUTPATIENT)
Dept: FAMILY MEDICINE | Facility: CLINIC | Age: 33
End: 2018-10-31

## 2018-10-31 DIAGNOSIS — D50.0 IRON DEFICIENCY ANEMIA DUE TO CHRONIC BLOOD LOSS: Primary | ICD-10-CM

## 2018-10-31 RX ORDER — FERROUS SULFATE 325(65) MG
325 TABLET ORAL EVERY OTHER DAY
Qty: 15 TABLET | Refills: 2 | Status: SHIPPED | OUTPATIENT
Start: 2018-10-31 | End: 2019-06-28

## 2018-10-31 NOTE — TELEPHONE ENCOUNTER
Patient was called with provider's message through a female  (as requested by the patient.) Pt agrees with this plan. She requests to only use female interpreters at office visits in the future. Advised to tell this to reception before the appt is scheduled.

## 2018-10-31 NOTE — PROGRESS NOTES
Result reviewed. Patient with iron deficiency anemia, likely from menses. Will treat with iron supplement every other day x2-3 months, then recheck.

## 2018-11-01 ENCOUNTER — OFFICE VISIT (OUTPATIENT)
Dept: PHYSICAL MEDICINE AND REHAB | Facility: CLINIC | Age: 33
End: 2018-11-01
Payer: COMMERCIAL

## 2018-11-01 VITALS
DIASTOLIC BLOOD PRESSURE: 82 MMHG | SYSTOLIC BLOOD PRESSURE: 150 MMHG | WEIGHT: 156 LBS | BODY MASS INDEX: 25.99 KG/M2 | HEART RATE: 87 BPM | OXYGEN SATURATION: 100 % | HEIGHT: 65 IN

## 2018-11-01 DIAGNOSIS — G82.20 PARAPLEGIA (H): ICD-10-CM

## 2018-11-01 DIAGNOSIS — M62.838 MUSCLE SPASTICITY: Primary | ICD-10-CM

## 2018-11-01 ASSESSMENT — PAIN SCALES - GENERAL: PAINLEVEL: NO PAIN (0)

## 2018-11-01 NOTE — LETTER
"11/1/2018       RE: Teresita Bang  [address]  [facility] [address]     Dear Colleague,    Thank you for referring your patient, Teresita Bang, to the [facility] PHYSICAL MEDICINE AND REHABILITATION at [facility]. Please see a copy of my visit note below.    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 She is accompanied by friend/family member and [place]  today. I last saw her 7/18/2018 which time he utilized 400 units of Botox divided evenly between the right and left leg adductors and hamstrings. Last visit we did shift some from the adductors to the hamstrings because we had very little spontaneous activity with the adductors.  Overall she reports.  This definitely helps the spasms and if anything this most recent injection lasted a little bit longer.  She is just started noticing the wear off earlier this week.  She denies any side effects with the last or previous injections.  No significant interval history.    Physical exam:  /82 (BP Location: Left arm)  Pulse 87  Ht 5' 5\" (1.651 m)  Wt 156 lb (70.8 kg)  SpO2 100%  BMI 25.96 kg/m2   Gloria is alert pleasant no diaphoresis. We use a [place]  for communication. She arrives with a power wheelchair with right side joystick control and tilt capacity. Involuntary spasms are noted in her bilateral lower extremities. These exacerbate with attempted passive range of motion. Has both flexion and extension synergy patterns. No adductor spasms noted and resting position is with legs comfortable wide.  Modified Lorin 3/4 in both legs quads and hamstrings above the knee, 4/4 versus fixed contracture in the bilateral ankles and toes with plantar flexion inversion positioning (unchanged compared to all prior " visits).  Overlying skin in the thighs hamstrings is healthy.    Assessment and recommendations:  1. Gloria has severe spasticity in her lower extremities related to her spinal cord injury and has benefited from chemodenervation with botulinum toxin in the past.  We will repeat these injections today. She for sure has knee extension tone but has articulated the knee flexion tone is the more problematic functionally.  2. Will further shift from adductor to hamstrings today. If continues without adductor tone we can forgo adductors altogether next visit.   3. She should continue with positioning and stretching exercises.  4. Follow-up in 3 months time, will call sooner if any side effects or other concerns arise.       Procedure: Chemodenervation to bilateral lower extremities utilizing onabotulinum toxin type A, Botox. I reviewed at length the risks benefits and potential time course for onset and duration. She agrees and signed the consent form today as does the . Family members present also in agreement as well. Total therapeutic units of Botox lot -G9 expiration 2021 was utilized. NDC #6305-0477-79. Diluted with preservative-free normal saline ratio of 100 units per milliliter. This was divided into 2 syringes. 27-gauge recording EMG needle was utilized in conjunction with EMG guidance to identify the most active motor units while avoiding surrounding structures. Today stayed in her power w/c and with help of brother were able to access targeted muscles. All areas were cleansed with ChloraPrep. The following muscles were then injected    Left le. Adductor muscle group, 50 units divided over 2 sites  2. Hamstring, 150 units divided over 4 sites    Right le. Adductor muscle group, 50 units divided over 2 sites  2. Hamstring, 150 units divided over 4 sites.    Again noted to have very little spontaneous activity in bilateral adductors.  Besides some involuntary spasms that occur during  the needle procedure, she tolerated very well.    Again, thank you for allowing me to participate in the care of your patient.      Sincerely,    Elio Dejesus MD

## 2018-11-01 NOTE — MR AVS SNAPSHOT
After Visit Summary   2018    Teresita Bang    MRN: [id number]           Patient Information     Date Of Birth          1985        Visit Information        Provider Department      2018 2:05 PM Elio Dejesus MD; [employer] [facility] Physical Medicine and Rehabilitation         Follow-ups after your visit        Your next 10 appointments already scheduled     2019  3:00 PM CST   (Arrive by 2:45 PM)   Return Botox with Elio Dejesus MD   [facility] Physical Medicine and Rehabilitation ([facility])    [address] [place] [id number] [phone]            2019  2:20 PM CDT   (Arrive by 2:05 PM)   Return Botox with Elio Dejesus MD   [facility] Physical Medicine and Rehabilitation [facility])    [address] [place] [id number] [phone]              Who to contact     Please call your clinic at [phone] to:    Ask questions about your health    Make or cancel appointments    Discuss your medicines    Learn about your test results    Speak to your doctor            Additional Information About Your Visit        MyChart Information     PureWRXt is an electronic gateway that provides easy, online access to your medical records. With EpiSensor, you can request a clinic appointment, read your test results, renew a prescription or communicate with your care team.     To sign up for PureWRXt visit the website at [email or web address]   You will be asked to enter the access code listed below, as well as some personal information. Please follow the directions to create your username and password.     Your access code is: [email or web address]  Expires: 2019  6:30 AM     Your access code will  in 90 days. If you need help or a new code, please contact your [facility] or call [phone] for assistance.        Care  "EveryWhere ID     This is your Care EveryWhere ID. This could be used by other organizations to access your [place] medical records  [id number]        Your Vitals Were     Pulse Height Pulse Oximetry BMI (Body Mass Index)          87 1.651 m (5' 5\") 100% 25.96 kg/m2         Blood Pressure from Last 3 Encounters:   11/01/18 150/82   10/29/18 164/85   08/01/18 158/88    Weight from Last 3 Encounters:   11/01/18 70.8 kg (156 lb)   10/29/18 70.8 kg (156 lb)   07/18/18 70.3 kg (155 lb)              Today, you had the following     No orders found for display       Primary Care Provider Office Phone # Fax #    Gina Eli PA-C [phone]       [address] [address]        Equal Access to Services     HERNANDEZ OLEARY : Hadii dariela amaroo Sorobel, waaxda luqadaha, qaybta kaalmada adetitoyakortney, nadiya blackwell . So [facility] [phone].    ATENCIÓN: Si habla español, tiene a kan disposición servicios gratuitos de asistencia lingüística. Carmina al [phone].    We comply with applicable federal civil rights laws and [place] laws. We do not discriminate on the basis of race, color, national origin, age, disability, sex, sexual orientation, or gender identity.            Thank you!     Thank you for choosing [facility] PHYSICAL MEDICINE AND REHABILITATION  for your care. Our goal is always to provide you with excellent care. Hearing back from our patients is one way we can continue to improve our services. Please take a few minutes to complete the written survey that you may receive in the mail after your visit with us. Thank you!             Your Updated Medication List - Protect others around you: Learn how to safely use, store and throw away your medicines at www.disposemymeds.org.          This list is accurate as of 11/1/18  3:20 PM.  Always use your most recent med list.                   Brand Name Dispense Instructions for use Diagnosis    acetaminophen " 325 MG tablet    TYLENOL    100 tablet    Take 2 tablets (650 mg) by mouth every 4 hours as needed for mild pain    Muscle spasticity       KASSANDRA REID GREASELESS cream     113 g    Apply topically every 6 hours as needed    Chronic pain of both shoulders       bisacodyl 10 MG Suppository    DULCOLAX    30 suppository    Place 1 suppository (10 mg) rectally daily as needed    Other constipation       botulinum toxin type A 100 units injection    BOTOX    400 Units    Inject 400 Units into the muscle every 3 months    Other muscle spasm       cetirizine 10 MG tablet    zyrTEC    30 tablet    Take 1 tablet (10 mg) by mouth every evening    Chronic rhinitis       ferrous sulfate 325 (65 Fe) MG tablet    IRON    15 tablet    Take 1 tablet (325 mg) by mouth every other day    Iron deficiency anemia due to chronic blood loss       fluticasone 50 MCG/ACT spray    FLONASE    1 Bottle    Spray 1-2 sprays into both nostrils daily    Chronic rhinitis       labetalol 100 MG tablet    NORMODYNE    180 tablet    Take 1 tablet (100 mg) by mouth 2 times daily    Essential hypertension       levothyroxine 100 MCG tablet    SYNTHROID/LEVOTHROID    90 tablet    Take 1 tablet (100 mcg) by mouth daily    Acquired hypothyroidism       order for DME     1 Device    Equipment being ordered: electric williams lift that can be operated by one person.Patient's Height is 5 feet 9 inches; weight as of 8/15/13 is 160 pounds    Paraplegia (H)       order for DME     1 Box    Equipment being ordered: gauze 4*4 pack of 100, refill prn    Surgical wound, non healing, sequela       order for DME     100 Units    Equipment being ordered: 4*4 gauzes for dressing change    Open wound       * order for DME     1 each    BP cuff, brand as covered by insurance.  Dx: HTN    Essential hypertension       * order for DME     1 each    Equipment being ordered: motorized wheelchair    Paraplegia (H)       order for DME     200 pad    Equipment being ordered: Yoan     Paraplegia (H), Continuous leakage(788.37)       order for DME     4 Box    Equipment being ordered: {generic gloves #100 with 5 refills    Paraplegia (H), Neurogenic bladder, Continuous leakage(788.37)       order for DME     1 each    Equipment being ordered: long-handled grasping assist device    Paraplegia (H)       order for DME     200 Units    Equipment being ordered:  adult diapers for urinary incontinence - Fax to [employer] [phone]    Paraplegia (H), Neurogenic bladder       vitamin D 2000 units tablet     90 tablet    Take 2,000 Units by mouth daily    Vitamin D deficiency disease       * Notice:  This list has 2 medication(s) that are the same as other medications prescribed for you. Read the directions carefully, and ask your doctor or other care provider to review them with you.

## 2018-11-01 NOTE — PROGRESS NOTES
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 She is accompanied by friend/family member and [facility]  today. I last saw her 7/18/2018 which time he utilized 400 units of Botox divided evenly between the right and left leg adductors and hamstrings. Last visit we did shift some from the adductors to the hamstrings because we had very little spontaneous activity with the adductors.  Overall she reports.  This definitely helps the spasms and if anything this most recent injection lasted a little bit longer.  She is just started noticing the wear off earlier this week.  She denies any side effects with the last or previous injections.  No significant interval history.    Physical exam:  /82 (BP Location: Left arm)  Pulse 87  Ht 5' 5\" (1.651 m)  Wt 156 lb (70.8 kg)  SpO2 100%  BMI 25.96 kg/m2   Gloria is alert pleasant no diaphoresis. We use a [employer]  for communication. She arrives with a power wheelchair with right side joystick control and tilt capacity. Involuntary spasms are noted in her bilateral lower extremities. These exacerbate with attempted passive range of motion. Has both flexion and extension synergy patterns. No adductor spasms noted and resting position is with legs comfortable wide.  Modified Lorin 3/4 in both legs quads and hamstrings above the knee, 4/4 versus fixed contracture in the bilateral ankles and toes with plantar flexion inversion positioning (unchanged compared to all prior visits).  Overlying skin in the thighs hamstrings is healthy.    Assessment and recommendations:  1. Gloria has severe spasticity in her lower extremities related to her spinal cord injury and has benefited from chemodenervation with botulinum toxin in the past.  We will repeat these injections today. She for sure has " knee extension tone but has articulated the knee flexion tone is the more problematic functionally.  2. Will further shift from adductor to hamstrings today. If continues without adductor tone we can forgo adductors altogether next visit.   3. She should continue with positioning and stretching exercises.  4. Follow-up in 3 months time, will call sooner if any side effects or other concerns arise.       Procedure: Chemodenervation to bilateral lower extremities utilizing onabotulinum toxin type A, Botox. I reviewed at length the risks benefits and potential time course for onset and duration. She agrees and signed the consent form today as does the . Family members present also in agreement as well. Total therapeutic units of Botox lot -P9 expiration 2021 was utilized. NDC #2258-3495-11. Diluted with preservative-free normal saline ratio of 100 units per milliliter. This was divided into 2 syringes. 27-gauge recording EMG needle was utilized in conjunction with EMG guidance to identify the most active motor units while avoiding surrounding structures. Today stayed in her power w/c and with help of brother were able to access targeted muscles. All areas were cleansed with ChloraPrep. The following muscles were then injected    Left le. Adductor muscle group, 50 units divided over 2 sites  2. Hamstring, 150 units divided over 4 sites    Right le. Adductor muscle group, 50 units divided over 2 sites  2. Hamstring, 150 units divided over 4 sites.    Again noted to have very little spontaneous activity in bilateral adductors.  Besides some involuntary spasms that occur during the needle procedure, she tolerated very well.

## 2019-01-25 ENCOUNTER — OFFICE VISIT (OUTPATIENT)
Dept: PHYSICAL MEDICINE AND REHAB | Facility: CLINIC | Age: 34
End: 2019-01-25
Payer: COMMERCIAL

## 2019-01-25 VITALS
SYSTOLIC BLOOD PRESSURE: 153 MMHG | RESPIRATION RATE: 16 BRPM | HEART RATE: 79 BPM | TEMPERATURE: 98.1 F | DIASTOLIC BLOOD PRESSURE: 71 MMHG | OXYGEN SATURATION: 99 %

## 2019-01-25 DIAGNOSIS — M62.838 MUSCLE SPASTICITY: Primary | ICD-10-CM

## 2019-01-25 ASSESSMENT — PAIN SCALES - GENERAL: PAINLEVEL: NO PAIN (0)

## 2019-01-25 NOTE — LETTER
1/25/2019       RE: Teresita Bang  [address]  [facility] [address]     Dear Colleague,    Thank you for referring your patient, Teresita Bang, to the [facility] PHYSICAL MEDICINE AND REHABILITATION at [facility]. Please see a copy of my visit note below.    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 She is accompanied by her brother and [place]  today. I last saw her 11/1/18 at which time he utilized 400 units of Botox divided evenly between the right and left leg adductors and hamstrings. Few visits ago we shifted some from the adductors to the hamstrings because we had very little spontaneous activity with the adductors.    Overall she reports some good benefit.  This definitely helps the spasms and if anything this most recent injection lasted a little bit longer.  She is just started noticing the wear off only in the last few days.  She denies any side effects with the last injections.  No significant interval history.    Physical exam:  /71 (BP Location: Left arm, Patient Position: Sitting, Cuff Size: Adult Large)   Pulse 79   Temp 98.1  F (36.7  C) (Oral)   Resp 16   SpO2 99%    Gloria is alert pleasant no diaphoresis. We use a [employer]  for communication. She arrives with a power wheelchair with right side joystick control and tilt capacity. Involuntary spasms are noted in her bilateral lower extremities. These exacerbate with attempted passive range of motion. Has both flexion and extension synergy patterns. No adductor spasms noted and resting position is with legs comfortable wide.  Modified Lorin 3/4 in both legs quads and hamstrings above the knee, 4/4 versus fixed contracture in the bilateral ankles and toes with plantar flexion inversion  positioning (unchanged compared to all prior visits).  Overlying skin in the thighs hamstrings is healthy.    Assessment and recommendations:  1. Gloria has severe spasticity in her lower extremities related to her spinal cord injury and has benefited from chemodenervation with botulinum toxin in the past.  We will repeat these injections today. She for sure has knee extension tone but has articulated the knee flexion tone is the more problematic functionally.  2. Will shift away from adductor to hamstrings today. If continues without adductor tone we can forgo adductors for subsequent visits.    3. She should continue with positioning and stretching exercises.  4. Follow-up in 3 months time, will call sooner if any side effects or other concerns arise.       Procedure: Chemodenervation to bilateral lower extremities utilizing onabotulinum toxin type A, Botox. I reviewed at length the risks benefits and potential time course for onset and duration. She agrees and signed the consent form today as does the . Family members present also in agreement as well. Total therapeutic units of Botox lot [id number] expiration 2021 was utilized. NDC #4268-4546-66. Diluted with preservative-free normal saline ratio of 100 units per milliliter. This was divided into 2 syringes. 27-gauge recording EMG needle was utilized in conjunction with EMG guidance to identify the most active motor units while avoiding surrounding structures. Today stayed in her power w/c and with help of brother were able to access targeted muscles. All areas were cleansed with ChloraPrep. The following muscles were then injected    Left le. Hamstring, 200 units divided over 4 sites    Right le. Hamstring, 200 units divided over 4 sites.     Again noted to have very little spontaneous activity in bilateral adductors.   Besides some involuntary spasms that occur during the needle procedure, she tolerated very well.    Again, thank you for  allowing me to participate in the care of your patient.      Sincerely,    Elio Dejesus MD

## 2019-01-25 NOTE — NURSING NOTE
Chief Complaint   Patient presents with     RECHECK     BOTOX     Medication Reconciliation     NEED ATTENTION     Andressa Carson

## 2019-01-25 NOTE — PROGRESS NOTES
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 She is accompanied by her brother and Cypriot  today. I last saw her 11/1/18 at which time he utilized 400 units of Botox divided evenly between the right and left leg adductors and hamstrings. Few visits ago we shifted some from the adductors to the hamstrings because we had very little spontaneous activity with the adductors.    Overall she reports some good benefit.  This definitely helps the spasms and if anything this most recent injection lasted a little bit longer.  She is just started noticing the wear off only in the last few days.  She denies any side effects with the last injections.  No significant interval history.    Physical exam:  /71 (BP Location: Left arm, Patient Position: Sitting, Cuff Size: Adult Large)   Pulse 79   Temp 98.1  F (36.7  C) (Oral)   Resp 16   SpO2 99%    Gloria is alert pleasant no diaphoresis. We use a [employer]  for communication. She arrives with a power wheelchair with right side joystick control and tilt capacity. Involuntary spasms are noted in her bilateral lower extremities. These exacerbate with attempted passive range of motion. Has both flexion and extension synergy patterns. No adductor spasms noted and resting position is with legs comfortable wide.  Modified Lorin 3/4 in both legs quads and hamstrings above the knee, 4/4 versus fixed contracture in the bilateral ankles and toes with plantar flexion inversion positioning (unchanged compared to all prior visits).  Overlying skin in the thighs hamstrings is healthy.    Assessment and recommendations:  1. Gloria has severe spasticity in her lower extremities related to her spinal cord injury and has benefited from chemodenervation with botulinum toxin in the past.  We will  repeat these injections today. She for sure has knee extension tone but has articulated the knee flexion tone is the more problematic functionally.  2. Will shift away from adductor to hamstrings today. If continues without adductor tone we can forgo adductors for subsequent visits.    3. She should continue with positioning and stretching exercises.  4. Follow-up in 3 months time, will call sooner if any side effects or other concerns arise.       Procedure: Chemodenervation to bilateral lower extremities utilizing onabotulinum toxin type A, Botox. I reviewed at length the risks benefits and potential time course for onset and duration. She agrees and signed the consent form today as does the . Family members present also in agreement as well. Total therapeutic units of Botox lot [id number] expiration 2021 was utilized. NDC #9905-3430-24. Diluted with preservative-free normal saline ratio of 100 units per milliliter. This was divided into 2 syringes. 27-gauge recording EMG needle was utilized in conjunction with EMG guidance to identify the most active motor units while avoiding surrounding structures. Today stayed in her power w/c and with help of brother were able to access targeted muscles. All areas were cleansed with ChloraPrep. The following muscles were then injected    Left le. Hamstring, 200 units divided over 4 sites    Right le. Hamstring, 200 units divided over 4 sites.     Again noted to have very little spontaneous activity in bilateral adductors.   Besides some involuntary spasms that occur during the needle procedure, she tolerated very well.

## 2019-04-18 ENCOUNTER — OFFICE VISIT (OUTPATIENT)
Dept: PHYSICAL MEDICINE AND REHAB | Facility: CLINIC | Age: 34
End: 2019-04-18
Payer: COMMERCIAL

## 2019-04-18 VITALS
HEIGHT: 65 IN | DIASTOLIC BLOOD PRESSURE: 78 MMHG | OXYGEN SATURATION: 97 % | BODY MASS INDEX: 25.96 KG/M2 | HEART RATE: 85 BPM | SYSTOLIC BLOOD PRESSURE: 165 MMHG | TEMPERATURE: 97.8 F

## 2019-04-18 DIAGNOSIS — M62.838 MUSCLE SPASTICITY: Primary | ICD-10-CM

## 2019-04-18 DIAGNOSIS — G82.20 PARAPLEGIA (H): ICD-10-CM

## 2019-04-18 ASSESSMENT — PAIN SCALES - GENERAL: PAINLEVEL: NO PAIN (0)

## 2019-04-18 NOTE — NURSING NOTE
Chief Complaint   Patient presents with     RECHECK     [facility] RETURN BOTOX       Surekha Schwab LPN

## 2019-04-18 NOTE — PROGRESS NOTES
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 She is accompanied by a family member and [facility]  today. I last saw her 1/25/2019 at which time he utilized 400 units of Botox divided evenly between the right and left leg hamstrings.  With more historical injections we had done both hamstrings and abductors that shifted to hamstrings only last time she was reporting well controlled adduction.  She is very pleased with the results last time.  Denies any side effects.  She believes the benefits started to wear off about a week ago.  There pressure is ongoing underlying spasticity but the severity of which is much improved with the botulinum toxin.    Physical exam:  /78 (BP Location: Left arm, Patient Position: Sitting, Cuff Size: Adult Regular)   Pulse 85   Temp 97.8  F (36.6  C) (Oral)   Ht 1.651 m (5' 5\")   SpO2 97%   BMI 25.96 kg/m     Gloria is alert pleasant no diaphoresis. We use a [employer]  for communication - they step out of room only during actual injections for privacy reasons. She arrives with a new power wheelchair with right side joystick control and full recline, tilt and independent leg elevation capacity. Involuntary spasms are noted in her bilateral lower extremities. These exacerbate with attempted passive range of motion. Has both flexion and extension synergy patterns. No adductor spasms noted and resting position is with legs comfortable wide.  Modified Lorin 3/4 in both legs quads and hamstrings above the knee, 4/4 versus fixed contracture in the bilateral ankles and toes with plantar flexion inversion positioning (unchanged compared to all prior visits).  Overlying skin in the thighs hamstrings is healthy.    Assessment and recommendations:  1. Gloria has severe spasticity in her lower " extremities related to her spinal cord injury and has benefited from chemodenervation with botulinum toxin in the past.  We will repeat these injections today. She for sure has knee extension tone but has articulated the knee flexion tone is the more problematic functionally.  2. Will continue with hamstrings only and continue to forego adductors  3. She should continue with positioning and stretching exercises.  4. Follow-up in 3 months time, will call sooner if any side effects or other concerns arise.       Procedure: Chemodenervation to bilateral lower extremities utilizing onabotulinum toxin type A, Botox. I reviewed at length the risks benefits and potential time course for onset and duration. She agrees and signed the consent form today with her single initial.   present and answered all questions for this process. Total 400 units of Botox lot  C3 expiration 2021 was utilized. NDC #3630-8849-13. Diluted with preservative-free normal saline ratio of 100 units per milliliter. This was divided into 2 syringes. 27-gauge recording EMG needle was utilized in conjunction with EMG guidance to identify the most active motor units while avoiding surrounding structures. Today stayed in her power w/c and with help of family were able to access targeted muscles. All areas were cleansed with ChloraPrep. The following muscles were then injected    Left le. Hamstring, 200 units divided over 4 sites    Right le. Hamstring, 200 units divided over 4 sites.     Besides some involuntary spasms that occur during the needle procedure, she tolerated very well.

## 2019-04-18 NOTE — LETTER
"4/18/2019       RE: Teresita Bang  [address]  [facility] [address]     Dear Colleague,    Thank you for referring your patient, Teresita Bang, to the [facility] PHYSICAL MEDICINE AND REHABILITATION at [facility]. Please see a copy of my visit note below.    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 She is accompanied by a family member and [facility]  today. I last saw her 1/25/2019 at which time he utilized 400 units of Botox divided evenly between the right and left leg hamstrings.  With more historical injections we had done both hamstrings and abductors that shifted to hamstrings only last time she was reporting well controlled adduction.  She is very pleased with the results last time.  Denies any side effects.  She believes the benefits started to wear off about a week ago.  There pressure is ongoing underlying spasticity but the severity of which is much improved with the botulinum toxin.    Physical exam:  /78 (BP Location: Left arm, Patient Position: Sitting, Cuff Size: Adult Regular)   Pulse 85   Temp 97.8  F (36.6  C) (Oral)   Ht 1.651 m (5' 5\")   SpO2 97%   BMI 25.96 kg/m      Gloria is alert pleasant no diaphoresis. We use a [employer]  for communication - they step out of room only during actual injections for privacy reasons. She arrives with a new power wheelchair with right side joystick control and full recline, tilt and independent leg elevation capacity. Involuntary spasms are noted in her bilateral lower extremities. These exacerbate with attempted passive range of motion. Has both flexion and extension synergy patterns. No adductor spasms noted and resting position is with legs comfortable wide.  Modified Lorin 3/4 in both legs quads and " hamstrings above the knee, 4/4 versus fixed contracture in the bilateral ankles and toes with plantar flexion inversion positioning (unchanged compared to all prior visits).  Overlying skin in the thighs hamstrings is healthy.    Assessment and recommendations:  1. Gloria has severe spasticity in her lower extremities related to her spinal cord injury and has benefited from chemodenervation with botulinum toxin in the past.  We will repeat these injections today. She for sure has knee extension tone but has articulated the knee flexion tone is the more problematic functionally.  2. Will continue with hamstrings only and continue to forego adductors  3. She should continue with positioning and stretching exercises.  4. Follow-up in 3 months time, will call sooner if any side effects or other concerns arise.       Procedure: Chemodenervation to bilateral lower extremities utilizing onabotulinum toxin type A, Botox. I reviewed at length the risks benefits and potential time course for onset and duration. She agrees and signed the consent form today with her single initial.   present and answered all questions for this process. Total 400 units of Botox lot  C3 expiration 2021 was utilized. NDC #9565-8303-91. Diluted with preservative-free normal saline ratio of 100 units per milliliter. This was divided into 2 syringes. 27-gauge recording EMG needle was utilized in conjunction with EMG guidance to identify the most active motor units while avoiding surrounding structures. Today stayed in her power w/c and with help of family were able to access targeted muscles. All areas were cleansed with ChloraPrep. The following muscles were then injected    Left le. Hamstring, 200 units divided over 4 sites    Right le. Hamstring, 200 units divided over 4 sites.     Besides some involuntary spasms that occur during the needle procedure, she tolerated very well.    Again, thank you for allowing me to  participate in the care of your patient.      Sincerely,    Elio Dejesus MD

## 2019-06-26 ENCOUNTER — OFFICE VISIT (OUTPATIENT)
Dept: FAMILY MEDICINE | Facility: CLINIC | Age: 34
End: 2019-06-26
Payer: COMMERCIAL

## 2019-06-26 VITALS
WEIGHT: 153 LBS | HEART RATE: 75 BPM | OXYGEN SATURATION: 100 % | BODY MASS INDEX: 25.46 KG/M2 | DIASTOLIC BLOOD PRESSURE: 78 MMHG | SYSTOLIC BLOOD PRESSURE: 136 MMHG | RESPIRATION RATE: 16 BRPM

## 2019-06-26 DIAGNOSIS — E55.9 VITAMIN D DEFICIENCY DISEASE: ICD-10-CM

## 2019-06-26 DIAGNOSIS — E03.9 ACQUIRED HYPOTHYROIDISM: Primary | ICD-10-CM

## 2019-06-26 DIAGNOSIS — E55.9 VITAMIN D DEFICIENCY: ICD-10-CM

## 2019-06-26 DIAGNOSIS — D50.8 OTHER IRON DEFICIENCY ANEMIA: ICD-10-CM

## 2019-06-26 DIAGNOSIS — D50.0 IRON DEFICIENCY ANEMIA DUE TO CHRONIC BLOOD LOSS: ICD-10-CM

## 2019-06-26 DIAGNOSIS — I10 BENIGN LABILE HYPERTENSION: ICD-10-CM

## 2019-06-26 LAB
ERYTHROCYTE [DISTWIDTH] IN BLOOD BY AUTOMATED COUNT: 16.4 % (ref 10–15)
HCT VFR BLD AUTO: 36.3 % (ref 35–47)
HGB BLD-MCNC: 11.3 G/DL (ref 11.7–15.7)
MCH RBC QN AUTO: 25.4 PG (ref 26.5–33)
MCHC RBC AUTO-ENTMCNC: 31.1 G/DL (ref 31.5–36.5)
MCV RBC AUTO: 82 FL (ref 78–100)
PLATELET # BLD AUTO: 319 10E9/L (ref 150–450)
RBC # BLD AUTO: 4.45 10E12/L (ref 3.8–5.2)
WBC # BLD AUTO: 7.9 10E9/L (ref 4–11)

## 2019-06-26 PROCEDURE — 84439 ASSAY OF FREE THYROXINE: CPT | Performed by: PHYSICIAN ASSISTANT

## 2019-06-26 PROCEDURE — 82306 VITAMIN D 25 HYDROXY: CPT | Performed by: PHYSICIAN ASSISTANT

## 2019-06-26 PROCEDURE — 82728 ASSAY OF FERRITIN: CPT | Performed by: PHYSICIAN ASSISTANT

## 2019-06-26 PROCEDURE — 80048 BASIC METABOLIC PNL TOTAL CA: CPT | Performed by: PHYSICIAN ASSISTANT

## 2019-06-26 PROCEDURE — 84443 ASSAY THYROID STIM HORMONE: CPT | Performed by: PHYSICIAN ASSISTANT

## 2019-06-26 PROCEDURE — 36415 COLL VENOUS BLD VENIPUNCTURE: CPT | Performed by: PHYSICIAN ASSISTANT

## 2019-06-26 PROCEDURE — 85027 COMPLETE CBC AUTOMATED: CPT | Performed by: PHYSICIAN ASSISTANT

## 2019-06-26 PROCEDURE — 99214 OFFICE O/P EST MOD 30 MIN: CPT | Performed by: PHYSICIAN ASSISTANT

## 2019-06-26 NOTE — LETTER
June 28, 2019      Teresita Bang  [address]  [facility] [address]        Dear Teresita,    We are writing to inform you of your test results.    - Your metabolic panel shows:  normal electrolytes (sodium, potassium, calcium) normal kidney function (creatinine and GFR) normal liver function (AST/ALT) and a normal fasting blood sugar level (<100)  - Your TSH was a little HIGH but your T4 was normal so we don't need to change your thyroid medication dose. We'll recheck this in 3-6 months.  - Your Vitamin D level is low. I recommend we increase your supplement to 4000 international unit(s) per day, I have sent refills to your pharmacy.  - Your Blood Count Results show normal White Blood Cell count (no sign of infection), LOW but IMPROVED Hemoglobin (anemia), and normal Platelets (affects clotting).  - Your iron levels are remain low. I would like you to continue the iron supplement every other day. We should recheck your levels in 3-6 months.    Results for orders placed or performed in visit on 06/26/19   Ferritin   Result Value Ref Range    Ferritin 7 (L) 12 - 150 ng/mL   CBC with platelets   Result Value Ref Range    WBC 7.9 4.0 - 11.0 10e9/L    RBC Count 4.45 3.8 - 5.2 10e12/L    Hemoglobin 11.3 (L) 11.7 - 15.7 g/dL    Hematocrit 36.3 35.0 - 47.0 %    MCV 82 78 - 100 fl    MCH 25.4 (L) 26.5 - 33.0 pg    MCHC 31.1 (L) 31.5 - 36.5 g/dL    RDW 16.4 (H) 10.0 - 15.0 %    Platelet Count 319 150 - 450 10e9/L   Basic metabolic panel   Result Value Ref Range    Sodium 140 133 - 144 mmol/L    Potassium 3.6 3.4 - 5.3 mmol/L    Chloride 105 94 - 109 mmol/L    Carbon Dioxide 26 20 - 32 mmol/L    Anion Gap 9 3 - 14 mmol/L    Glucose 104 (H) 70 - 99 mg/dL    Urea Nitrogen 7 7 - 30 mg/dL    Creatinine 0.50 (L) 0.52 - 1.04 mg/dL    GFR Estimate >90 >60 mL/min/[1.73_m2]    GFR Estimate If Black >90 >60 mL/min/[1.73_m2]    Calcium 8.4 (L) 8.5 - 10.1 mg/dL   TSH with free T4 reflex   Result Value Ref Range    TSH 4.81  (H) 0.40 - 4.00 mU/L   Vitamin D Deficiency   Result Value Ref Range    Vitamin D Deficiency screening 18 (L) 20 - 75 ug/L   T4 free   Result Value Ref Range    T4 Free 1.19 0.76 - 1.46 ng/dL       Thank you for choosing [facility].  We appreciate the opportunity to serve you and look forward to supporting your healthcare needs in the future.    If you have any questions or concerns, please call me or my staff at [phone].      Sincerely,        Gina Eli PA-C
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
## 2019-06-27 LAB
ANION GAP SERPL CALCULATED.3IONS-SCNC: 9 MMOL/L (ref 3–14)
BUN SERPL-MCNC: 7 MG/DL (ref 7–30)
CALCIUM SERPL-MCNC: 8.4 MG/DL (ref 8.5–10.1)
CHLORIDE SERPL-SCNC: 105 MMOL/L (ref 94–109)
CO2 SERPL-SCNC: 26 MMOL/L (ref 20–32)
CREAT SERPL-MCNC: 0.5 MG/DL (ref 0.52–1.04)
DEPRECATED CALCIDIOL+CALCIFEROL SERPL-MC: 18 UG/L (ref 20–75)
FERRITIN SERPL-MCNC: 7 NG/ML (ref 12–150)
GFR SERPL CREATININE-BSD FRML MDRD: >90 ML/MIN/{1.73_M2}
GLUCOSE SERPL-MCNC: 104 MG/DL (ref 70–99)
POTASSIUM SERPL-SCNC: 3.6 MMOL/L (ref 3.4–5.3)
SODIUM SERPL-SCNC: 140 MMOL/L (ref 133–144)
T4 FREE SERPL-MCNC: 1.19 NG/DL (ref 0.76–1.46)
TSH SERPL DL<=0.005 MIU/L-ACNC: 4.81 MU/L (ref 0.4–4)

## 2019-06-28 RX ORDER — CHOLECALCIFEROL (VITAMIN D3) 50 MCG
2 TABLET ORAL DAILY
Qty: 180 TABLET | Refills: 11 | Status: SHIPPED | OUTPATIENT
Start: 2019-06-28 | End: 2020-06-10

## 2019-06-28 RX ORDER — FERROUS SULFATE 325(65) MG
325 TABLET ORAL EVERY OTHER DAY
Qty: 15 TABLET | Refills: 11 | Status: SHIPPED | OUTPATIENT
Start: 2019-06-28 | End: 2019-10-09

## 2019-06-28 NOTE — RESULT ENCOUNTER NOTE
Lab letter signed and printed. Message comments below:  - Your metabolic panel shows:  normal electrolytes (sodium, potassium, calcium) normal kidney function (creatinine and GFR) normal liver function (AST/ALT) and a normal fasting blood sugar level (<100)  - Your TSH was a little HIGH but your T4 was normal so we don't need to change your thyroid medication dose. We'll recheck this in 3-6 months.  - Your Vitamin D level is low. I recommend we increase your supplement to 4000 international unit(s) per day, I have sent refills to your pharmacy.  - Your Blood Count Results show normal White Blood Cell count (no sign of infection), LOW but IMPROVED Hemoglobin (anemia), and normal Platelets (affects clotting).  - Your iron levels are remain low. I would like you to continue the iron supplement every other day. We should recheck your levels in 3-6 months.

## 2019-07-29 ENCOUNTER — HOSPITAL ENCOUNTER (EMERGENCY)
Facility: CLINIC | Age: 34
Discharge: HOME | End: 2019-07-29
Attending: EMERGENCY MEDICINE | Admitting: EMERGENCY MEDICINE
Payer: COMMERCIAL

## 2019-07-29 ENCOUNTER — APPOINTMENT (OUTPATIENT)
Dept: GENERAL RADIOLOGY | Facility: CLINIC | Age: 34
End: 2019-07-29
Attending: EMERGENCY MEDICINE
Payer: COMMERCIAL

## 2019-07-29 ENCOUNTER — OFFICE VISIT (OUTPATIENT)
Dept: FAMILY MEDICINE | Facility: CLINIC | Age: 34
End: 2019-07-29
Payer: COMMERCIAL

## 2019-07-29 ENCOUNTER — TELEPHONE (OUTPATIENT)
Dept: FAMILY MEDICINE | Facility: CLINIC | Age: 34
End: 2019-07-29

## 2019-07-29 VITALS
HEART RATE: 101 BPM | OXYGEN SATURATION: 99 % | BODY MASS INDEX: 26.79 KG/M2 | RESPIRATION RATE: 16 BRPM | WEIGHT: 161 LBS | SYSTOLIC BLOOD PRESSURE: 138 MMHG | TEMPERATURE: 101.5 F | DIASTOLIC BLOOD PRESSURE: 84 MMHG

## 2019-07-29 VITALS
TEMPERATURE: 99.3 F | OXYGEN SATURATION: 98 % | RESPIRATION RATE: 18 BRPM | SYSTOLIC BLOOD PRESSURE: 133 MMHG | HEART RATE: 108 BPM | DIASTOLIC BLOOD PRESSURE: 74 MMHG

## 2019-07-29 DIAGNOSIS — J18.9 COMMUNITY ACQUIRED PNEUMONIA, UNSPECIFIED LATERALITY: Primary | ICD-10-CM

## 2019-07-29 DIAGNOSIS — G82.20 PARAPLEGIA (H): ICD-10-CM

## 2019-07-29 DIAGNOSIS — R50.9 FEBRILE ILLNESS: ICD-10-CM

## 2019-07-29 DIAGNOSIS — N39.45 CONTINUOUS LEAKAGE(788.37): ICD-10-CM

## 2019-07-29 DIAGNOSIS — J31.0 CHRONIC RHINITIS: ICD-10-CM

## 2019-07-29 DIAGNOSIS — M62.838 MUSCLE SPASTICITY: ICD-10-CM

## 2019-07-29 DIAGNOSIS — E03.9 ACQUIRED HYPOTHYROIDISM: ICD-10-CM

## 2019-07-29 LAB
ALBUMIN SERPL-MCNC: 3.1 G/DL (ref 3.4–5)
ALBUMIN UR-MCNC: 10 MG/DL
ALP SERPL-CCNC: 134 U/L (ref 40–150)
ALT SERPL W P-5'-P-CCNC: 21 U/L (ref 0–50)
ANION GAP SERPL CALCULATED.3IONS-SCNC: 9 MMOL/L (ref 3–14)
APPEARANCE UR: ABNORMAL
AST SERPL W P-5'-P-CCNC: 26 U/L (ref 0–45)
BACTERIA #/AREA URNS HPF: ABNORMAL /HPF
BASOPHILS # BLD AUTO: 0 10E9/L (ref 0–0.2)
BASOPHILS NFR BLD AUTO: 0.1 %
BILIRUB SERPL-MCNC: 0.4 MG/DL (ref 0.2–1.3)
BILIRUB UR QL STRIP: NEGATIVE
BUN SERPL-MCNC: 9 MG/DL (ref 7–30)
CALCIUM SERPL-MCNC: 8.1 MG/DL (ref 8.5–10.1)
CHLORIDE SERPL-SCNC: 104 MMOL/L (ref 94–109)
CO2 BLDCOV-SCNC: 25 MMOL/L (ref 21–28)
CO2 SERPL-SCNC: 24 MMOL/L (ref 20–32)
COLOR UR AUTO: ABNORMAL
CREAT SERPL-MCNC: 0.44 MG/DL (ref 0.52–1.04)
DIFFERENTIAL METHOD BLD: ABNORMAL
EOSINOPHIL # BLD AUTO: 0.1 10E9/L (ref 0–0.7)
EOSINOPHIL NFR BLD AUTO: 0.4 %
ERYTHROCYTE [DISTWIDTH] IN BLOOD BY AUTOMATED COUNT: 19 % (ref 10–15)
GFR SERPL CREATININE-BSD FRML MDRD: >90 ML/MIN/{1.73_M2}
GLUCOSE SERPL-MCNC: 94 MG/DL (ref 70–99)
GLUCOSE UR STRIP-MCNC: NEGATIVE MG/DL
HCT VFR BLD AUTO: 34.6 % (ref 35–47)
HGB BLD-MCNC: 10.7 G/DL (ref 11.7–15.7)
HGB UR QL STRIP: ABNORMAL
IMM GRANULOCYTES # BLD: 0 10E9/L (ref 0–0.4)
IMM GRANULOCYTES NFR BLD: 0.3 %
KETONES UR STRIP-MCNC: NEGATIVE MG/DL
LACTATE BLD-SCNC: 1.9 MMOL/L (ref 0.7–2.1)
LEUKOCYTE ESTERASE UR QL STRIP: ABNORMAL
LYMPHOCYTES # BLD AUTO: 0.7 10E9/L (ref 0.8–5.3)
LYMPHOCYTES NFR BLD AUTO: 5.1 %
MCH RBC QN AUTO: 25.4 PG (ref 26.5–33)
MCHC RBC AUTO-ENTMCNC: 30.9 G/DL (ref 31.5–36.5)
MCV RBC AUTO: 82 FL (ref 78–100)
MONOCYTES # BLD AUTO: 0.6 10E9/L (ref 0–1.3)
MONOCYTES NFR BLD AUTO: 4.3 %
MUCOUS THREADS #/AREA URNS LPF: PRESENT /LPF
NEUTROPHILS # BLD AUTO: 11.8 10E9/L (ref 1.6–8.3)
NEUTROPHILS NFR BLD AUTO: 89.8 %
NITRATE UR QL: NEGATIVE
NRBC # BLD AUTO: 0 10*3/UL
NRBC BLD AUTO-RTO: 0 /100
PCO2 BLDV: 35 MM HG (ref 40–50)
PH BLDV: 7.46 PH (ref 7.32–7.43)
PH UR STRIP: 7 PH (ref 5–7)
PLATELET # BLD AUTO: 245 10E9/L (ref 150–450)
PO2 BLDV: 46 MM HG (ref 25–47)
POTASSIUM SERPL-SCNC: 3.7 MMOL/L (ref 3.4–5.3)
PROT SERPL-MCNC: 7.7 G/DL (ref 6.8–8.8)
RBC # BLD AUTO: 4.22 10E12/L (ref 3.8–5.2)
RBC #/AREA URNS AUTO: <1 /HPF (ref 0–2)
SAO2 % BLDV FROM PO2: 84 %
SODIUM SERPL-SCNC: 137 MMOL/L (ref 133–144)
SOURCE: ABNORMAL
SP GR UR STRIP: 1 (ref 1–1.03)
SQUAMOUS #/AREA URNS AUTO: <1 /HPF (ref 0–1)
UROBILINOGEN UR STRIP-MCNC: NORMAL MG/DL (ref 0–2)
WBC # BLD AUTO: 13.1 10E9/L (ref 4–11)
WBC #/AREA URNS AUTO: 85 /HPF (ref 0–5)
WBC CLUMPS #/AREA URNS HPF: PRESENT /HPF

## 2019-07-29 PROCEDURE — 87040 BLOOD CULTURE FOR BACTERIA: CPT | Performed by: EMERGENCY MEDICINE

## 2019-07-29 PROCEDURE — 81025 URINE PREGNANCY TEST: CPT | Performed by: EMERGENCY MEDICINE

## 2019-07-29 PROCEDURE — 99284 EMERGENCY DEPT VISIT MOD MDM: CPT | Mod: 25 | Performed by: EMERGENCY MEDICINE

## 2019-07-29 PROCEDURE — 96375 TX/PRO/DX INJ NEW DRUG ADDON: CPT | Performed by: EMERGENCY MEDICINE

## 2019-07-29 PROCEDURE — 99214 OFFICE O/P EST MOD 30 MIN: CPT | Performed by: PHYSICIAN ASSISTANT

## 2019-07-29 PROCEDURE — 85025 COMPLETE CBC W/AUTO DIFF WBC: CPT | Performed by: EMERGENCY MEDICINE

## 2019-07-29 PROCEDURE — 87088 URINE BACTERIA CULTURE: CPT | Performed by: EMERGENCY MEDICINE

## 2019-07-29 PROCEDURE — 87077 CULTURE AEROBIC IDENTIFY: CPT | Performed by: EMERGENCY MEDICINE

## 2019-07-29 PROCEDURE — 87086 URINE CULTURE/COLONY COUNT: CPT | Performed by: EMERGENCY MEDICINE

## 2019-07-29 PROCEDURE — 81001 URINALYSIS AUTO W/SCOPE: CPT | Performed by: EMERGENCY MEDICINE

## 2019-07-29 PROCEDURE — 99284 EMERGENCY DEPT VISIT MOD MDM: CPT | Mod: Z6 | Performed by: EMERGENCY MEDICINE

## 2019-07-29 PROCEDURE — 80053 COMPREHEN METABOLIC PANEL: CPT | Performed by: EMERGENCY MEDICINE

## 2019-07-29 PROCEDURE — 82803 BLOOD GASES ANY COMBINATION: CPT

## 2019-07-29 PROCEDURE — 71045 X-RAY EXAM CHEST 1 VIEW: CPT

## 2019-07-29 PROCEDURE — 96361 HYDRATE IV INFUSION ADD-ON: CPT | Performed by: EMERGENCY MEDICINE

## 2019-07-29 PROCEDURE — 83605 ASSAY OF LACTIC ACID: CPT

## 2019-07-29 PROCEDURE — 96365 THER/PROPH/DIAG IV INF INIT: CPT | Performed by: EMERGENCY MEDICINE

## 2019-07-29 PROCEDURE — 87186 SC STD MICRODIL/AGAR DIL: CPT | Performed by: EMERGENCY MEDICINE

## 2019-07-29 PROCEDURE — 87800 DETECT AGNT MULT DNA DIREC: CPT | Performed by: EMERGENCY MEDICINE

## 2019-07-29 PROCEDURE — 25000128 H RX IP 250 OP 636: Performed by: EMERGENCY MEDICINE

## 2019-07-29 RX ORDER — GUAIFENESIN 600 MG/1
600 TABLET, EXTENDED RELEASE ORAL 2 TIMES DAILY
Qty: 60 TABLET | Refills: 0 | Status: ON HOLD | OUTPATIENT
Start: 2019-07-29 | End: 2019-08-02

## 2019-07-29 RX ORDER — ONDANSETRON 2 MG/ML
4 INJECTION INTRAMUSCULAR; INTRAVENOUS ONCE
Status: COMPLETED | OUTPATIENT
Start: 2019-07-29 | End: 2019-07-29

## 2019-07-29 RX ORDER — CETIRIZINE HYDROCHLORIDE 10 MG/1
10 TABLET ORAL EVERY EVENING
Qty: 90 TABLET | Refills: 11 | Status: SHIPPED | OUTPATIENT
Start: 2019-07-29 | End: 2020-06-10

## 2019-07-29 RX ORDER — CEFTRIAXONE 2 G/1
2 INJECTION, POWDER, FOR SOLUTION INTRAMUSCULAR; INTRAVENOUS ONCE
Status: COMPLETED | OUTPATIENT
Start: 2019-07-29 | End: 2019-07-29

## 2019-07-29 RX ORDER — ACETAMINOPHEN 325 MG/1
650 TABLET ORAL ONCE
Status: DISCONTINUED | OUTPATIENT
Start: 2019-07-29 | End: 2019-08-02

## 2019-07-29 RX ORDER — LEVOFLOXACIN 500 MG/1
500 TABLET, FILM COATED ORAL DAILY
Qty: 10 TABLET | Refills: 0 | Status: ON HOLD | OUTPATIENT
Start: 2019-07-29 | End: 2019-08-02

## 2019-07-29 RX ORDER — LEVOTHYROXINE SODIUM 100 UG/1
100 TABLET ORAL DAILY
Qty: 90 TABLET | Refills: 3 | Status: SHIPPED | OUTPATIENT
Start: 2019-07-29 | End: 2019-12-28

## 2019-07-29 RX ORDER — SODIUM CHLORIDE 9 MG/ML
INJECTION, SOLUTION INTRAVENOUS
Status: DISCONTINUED
Start: 2019-07-29 | End: 2019-07-30 | Stop reason: HOSPADM

## 2019-07-29 RX ORDER — ACETAMINOPHEN 325 MG/1
650 TABLET ORAL EVERY 4 HOURS PRN
Qty: 100 TABLET | Refills: 11 | Status: SHIPPED | OUTPATIENT
Start: 2019-07-29

## 2019-07-29 RX ORDER — BENZONATATE 100 MG/1
100 CAPSULE ORAL 3 TIMES DAILY PRN
Qty: 21 CAPSULE | Refills: 0 | Status: ON HOLD | OUTPATIENT
Start: 2019-07-29 | End: 2019-08-02

## 2019-07-29 RX ORDER — ALBUTEROL SULFATE 90 UG/1
2 AEROSOL, METERED RESPIRATORY (INHALATION) 2 TIMES DAILY
Qty: 1 INHALER | Refills: 0 | Status: SHIPPED | OUTPATIENT
Start: 2019-07-29 | End: 2019-08-19

## 2019-07-29 RX ORDER — ONDANSETRON 4 MG/1
4 TABLET, ORALLY DISINTEGRATING ORAL EVERY 6 HOURS PRN
Qty: 10 TABLET | Refills: 0 | Status: ON HOLD | OUTPATIENT
Start: 2019-07-29 | End: 2019-08-02

## 2019-07-29 RX ADMIN — CEFTRIAXONE SODIUM 2 G: 2 INJECTION, POWDER, FOR SOLUTION INTRAMUSCULAR; INTRAVENOUS at 21:14

## 2019-07-29 RX ADMIN — SODIUM CHLORIDE 1000 ML: 9 INJECTION, SOLUTION INTRAVENOUS at 20:55

## 2019-07-29 RX ADMIN — SODIUM CHLORIDE 1000 ML: 9 INJECTION, SOLUTION INTRAVENOUS at 18:16

## 2019-07-29 RX ADMIN — ONDANSETRON 4 MG: 2 INJECTION INTRAMUSCULAR; INTRAVENOUS at 19:47

## 2019-07-29 ASSESSMENT — ENCOUNTER SYMPTOMS
VOMITING: 1
HEMATURIA: 0
DYSURIA: 0
DIARRHEA: 0
FREQUENCY: 0
NAUSEA: 1
DIFFICULTY URINATING: 0
COUGH: 1
FEVER: 1
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
## 2019-07-29 NOTE — ED AVS SNAPSHOT
[facility], [place], Emergency Department  [address]  [facility] [address]  Phone:  [phone]  Fax:  [phone]                                    Teresita Bang   MRN: [id number]    Department:  [facility], Emergency Department   Date of Visit:  7/29/2019           After Visit Summary Signature Page    I have received my discharge instructions, and my questions have been answered. I have discussed any challenges I see with this plan with the nurse or doctor.    ..........................................................................................................................................  Patient/Patient Representative Signature      ..........................................................................................................................................  Patient Representative Print Name and Relationship to Patient    ..................................................               ................................................  Date                                   Time    ..........................................................................................................................................  Reviewed by Signature/Title    ...................................................              ..............................................  Date                                               Time          22EPIC Rev 08/18

## 2019-07-29 NOTE — TELEPHONE ENCOUNTER
Patient vomiting in waiting room. Vomiting continuous x 45 minutes.  Threw up tylenol.  Temp up to 102.4, Pulse 146, Ox 94-98, /90. ? Dehydration.     In wheelchair was waiting for medi ride. Recommend 911 as not safe to ride to hospital without medical assistance and need for fluids and fever control.    Brother put wheelchair on medi ride and Teresita was transported to [place] ED.    Anika Owens RN- Triage [employer]
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
## 2019-07-29 NOTE — ED NOTES
Bed: ED  Expected date: 7/29/19  Expected time: 5:24 PM  Means of arrival: Ambulance  Comments:  [phone]---[phone] female nausea   Is This A New Presentation, Or A Follow-Up?: Nail Discoloration How Severe Is It?: moderate

## 2019-07-30 ENCOUNTER — HOSPITAL ENCOUNTER (INPATIENT)
Facility: CLINIC | Age: 34
LOS: 3 days | Discharge: HOME HEALTH | End: 2019-08-02
Attending: EMERGENCY MEDICINE | Admitting: INTERNAL MEDICINE
Payer: COMMERCIAL

## 2019-07-30 ENCOUNTER — TELEPHONE (OUTPATIENT)
Dept: FAMILY MEDICINE | Facility: CLINIC | Age: 34
End: 2019-07-30

## 2019-07-30 DIAGNOSIS — N12 PYELONEPHRITIS: ICD-10-CM

## 2019-07-30 DIAGNOSIS — I10 ESSENTIAL HYPERTENSION: ICD-10-CM

## 2019-07-30 DIAGNOSIS — A41.51 SEPSIS DUE TO ESCHERICHIA COLI (E. COLI) (H): Primary | ICD-10-CM

## 2019-07-30 LAB
ALBUMIN SERPL-MCNC: 2.9 G/DL (ref 3.4–5)
ALP SERPL-CCNC: 124 U/L (ref 40–150)
ALT SERPL W P-5'-P-CCNC: 22 U/L (ref 0–50)
ANION GAP SERPL CALCULATED.3IONS-SCNC: 9 MMOL/L (ref 3–14)
AST SERPL W P-5'-P-CCNC: 27 U/L (ref 0–45)
BACTERIA SPEC CULT: ABNORMAL
BACTERIA SPEC CULT: ABNORMAL
BASOPHILS # BLD AUTO: 0 10E9/L (ref 0–0.2)
BASOPHILS NFR BLD AUTO: 0 %
BILIRUB SERPL-MCNC: 0.2 MG/DL (ref 0.2–1.3)
BUN SERPL-MCNC: 8 MG/DL (ref 7–30)
CALCIUM SERPL-MCNC: 8.2 MG/DL (ref 8.5–10.1)
CHLORIDE SERPL-SCNC: 105 MMOL/L (ref 94–109)
CO2 SERPL-SCNC: 25 MMOL/L (ref 20–32)
CREAT SERPL-MCNC: 0.43 MG/DL (ref 0.52–1.04)
DIFFERENTIAL METHOD BLD: ABNORMAL
EOSINOPHIL # BLD AUTO: 0.2 10E9/L (ref 0–0.7)
EOSINOPHIL NFR BLD AUTO: 2 %
ERYTHROCYTE [DISTWIDTH] IN BLOOD BY AUTOMATED COUNT: 19.3 % (ref 10–15)
GFR SERPL CREATININE-BSD FRML MDRD: >90 ML/MIN/{1.73_M2}
GLUCOSE SERPL-MCNC: 106 MG/DL (ref 70–99)
HCG UR QL: NEGATIVE
HCT VFR BLD AUTO: 33.4 % (ref 35–47)
HGB BLD-MCNC: 10 G/DL (ref 11.7–15.7)
IMM GRANULOCYTES # BLD: 0 10E9/L (ref 0–0.4)
IMM GRANULOCYTES NFR BLD: 0.4 %
LACTATE BLD-SCNC: 1.2 MMOL/L (ref 0.7–2)
LYMPHOCYTES # BLD AUTO: 1.3 10E9/L (ref 0.8–5.3)
LYMPHOCYTES NFR BLD AUTO: 12.4 %
Lab: ABNORMAL
MCH RBC QN AUTO: 24.8 PG (ref 26.5–33)
MCHC RBC AUTO-ENTMCNC: 29.9 G/DL (ref 31.5–36.5)
MCV RBC AUTO: 83 FL (ref 78–100)
MONOCYTES # BLD AUTO: 0.5 10E9/L (ref 0–1.3)
MONOCYTES NFR BLD AUTO: 5.2 %
NEUTROPHILS # BLD AUTO: 8.2 10E9/L (ref 1.6–8.3)
NEUTROPHILS NFR BLD AUTO: 80 %
NRBC # BLD AUTO: 0 10*3/UL
NRBC BLD AUTO-RTO: 0 /100
PLATELET # BLD AUTO: 230 10E9/L (ref 150–450)
POTASSIUM SERPL-SCNC: 3.6 MMOL/L (ref 3.4–5.3)
PROCALCITONIN SERPL-MCNC: 9.84 NG/ML
PROT SERPL-MCNC: 7.6 G/DL (ref 6.8–8.8)
RBC # BLD AUTO: 4.03 10E12/L (ref 3.8–5.2)
SODIUM SERPL-SCNC: 139 MMOL/L (ref 133–144)
SPECIMEN SOURCE: ABNORMAL
WBC # BLD AUTO: 10.2 10E9/L (ref 4–11)

## 2019-07-30 PROCEDURE — 96367 TX/PROPH/DG ADDL SEQ IV INF: CPT | Performed by: EMERGENCY MEDICINE

## 2019-07-30 PROCEDURE — 25800030 ZZH RX IP 258 OP 636: Performed by: EMERGENCY MEDICINE

## 2019-07-30 PROCEDURE — 87040 BLOOD CULTURE FOR BACTERIA: CPT | Performed by: EMERGENCY MEDICINE

## 2019-07-30 PROCEDURE — 96365 THER/PROPH/DIAG IV INF INIT: CPT | Performed by: EMERGENCY MEDICINE

## 2019-07-30 PROCEDURE — 99207 ZZC CDG-HISTORY COMP: MEETS EXP. PROBLEM FOCUSED-DOWN CODED LACK OF ROS: CPT | Performed by: INTERNAL MEDICINE

## 2019-07-30 PROCEDURE — 99285 EMERGENCY DEPT VISIT HI MDM: CPT | Mod: 25 | Performed by: EMERGENCY MEDICINE

## 2019-07-30 PROCEDURE — 12000001 ZZH R&B MED SURG/OB UMMC

## 2019-07-30 PROCEDURE — 25000132 ZZH RX MED GY IP 250 OP 250 PS 637: Performed by: INTERNAL MEDICINE

## 2019-07-30 PROCEDURE — 99285 EMERGENCY DEPT VISIT HI MDM: CPT | Mod: Z6 | Performed by: EMERGENCY MEDICINE

## 2019-07-30 PROCEDURE — 80053 COMPREHEN METABOLIC PANEL: CPT | Performed by: EMERGENCY MEDICINE

## 2019-07-30 PROCEDURE — 85025 COMPLETE CBC W/AUTO DIFF WBC: CPT | Performed by: EMERGENCY MEDICINE

## 2019-07-30 PROCEDURE — 96361 HYDRATE IV INFUSION ADD-ON: CPT | Performed by: EMERGENCY MEDICINE

## 2019-07-30 PROCEDURE — 83605 ASSAY OF LACTIC ACID: CPT | Performed by: EMERGENCY MEDICINE

## 2019-07-30 PROCEDURE — 25000128 H RX IP 250 OP 636: Performed by: EMERGENCY MEDICINE

## 2019-07-30 PROCEDURE — 99221 1ST HOSP IP/OBS SF/LOW 40: CPT | Mod: AI | Performed by: INTERNAL MEDICINE

## 2019-07-30 PROCEDURE — 84145 PROCALCITONIN (PCT): CPT | Performed by: EMERGENCY MEDICINE

## 2019-07-30 RX ORDER — ACETAMINOPHEN 325 MG/1
650 TABLET ORAL EVERY 4 HOURS PRN
Status: DISCONTINUED | OUTPATIENT
Start: 2019-07-30 | End: 2019-08-02 | Stop reason: HOSPADM

## 2019-07-30 RX ORDER — ALBUTEROL SULFATE 90 UG/1
2 AEROSOL, METERED RESPIRATORY (INHALATION) 2 TIMES DAILY
Status: DISCONTINUED | OUTPATIENT
Start: 2019-07-30 | End: 2019-08-02 | Stop reason: HOSPADM

## 2019-07-30 RX ORDER — BISACODYL 10 MG
10 SUPPOSITORY, RECTAL RECTAL DAILY PRN
Status: DISCONTINUED | OUTPATIENT
Start: 2019-07-30 | End: 2019-08-02 | Stop reason: HOSPADM

## 2019-07-30 RX ORDER — ONDANSETRON 4 MG/1
4 TABLET, ORALLY DISINTEGRATING ORAL EVERY 6 HOURS PRN
Status: DISCONTINUED | OUTPATIENT
Start: 2019-07-30 | End: 2019-08-02 | Stop reason: HOSPADM

## 2019-07-30 RX ORDER — SODIUM CHLORIDE 9 MG/ML
INJECTION, SOLUTION INTRAVENOUS CONTINUOUS
Status: DISCONTINUED | OUTPATIENT
Start: 2019-07-30 | End: 2019-07-31

## 2019-07-30 RX ORDER — CEFTRIAXONE 2 G/1
2 INJECTION, POWDER, FOR SOLUTION INTRAMUSCULAR; INTRAVENOUS ONCE
Status: COMPLETED | OUTPATIENT
Start: 2019-07-30 | End: 2019-07-30

## 2019-07-30 RX ORDER — LEVOFLOXACIN 5 MG/ML
750 INJECTION, SOLUTION INTRAVENOUS ONCE
Status: COMPLETED | OUTPATIENT
Start: 2019-07-30 | End: 2019-07-30

## 2019-07-30 RX ORDER — LEVOTHYROXINE SODIUM 100 UG/1
100 TABLET ORAL DAILY
Status: DISCONTINUED | OUTPATIENT
Start: 2019-07-31 | End: 2019-08-02 | Stop reason: HOSPADM

## 2019-07-30 RX ORDER — CEFTRIAXONE 2 G/1
2 INJECTION, POWDER, FOR SOLUTION INTRAMUSCULAR; INTRAVENOUS EVERY 24 HOURS
Status: DISCONTINUED | OUTPATIENT
Start: 2019-07-31 | End: 2019-07-31

## 2019-07-30 RX ORDER — SODIUM CHLORIDE 9 MG/ML
1000 INJECTION, SOLUTION INTRAVENOUS CONTINUOUS
Status: DISCONTINUED | OUTPATIENT
Start: 2019-07-30 | End: 2019-07-31

## 2019-07-30 RX ORDER — CETIRIZINE HYDROCHLORIDE 10 MG/1
10 TABLET ORAL EVERY EVENING
Status: DISCONTINUED | OUTPATIENT
Start: 2019-07-30 | End: 2019-08-02 | Stop reason: HOSPADM

## 2019-07-30 RX ADMIN — SODIUM CHLORIDE 1000 ML: 9 INJECTION, SOLUTION INTRAVENOUS at 14:08

## 2019-07-30 RX ADMIN — LEVOFLOXACIN 750 MG: 5 INJECTION, SOLUTION INTRAVENOUS at 14:09

## 2019-07-30 RX ADMIN — CEFTRIAXONE SODIUM 2 G: 2 INJECTION, POWDER, FOR SOLUTION INTRAMUSCULAR; INTRAVENOUS at 16:21

## 2019-07-30 RX ADMIN — SODIUM CHLORIDE 1000 ML: 9 INJECTION, SOLUTION INTRAVENOUS at 19:31

## 2019-07-30 RX ADMIN — CETIRIZINE HYDROCHLORIDE 10 MG: 10 TABLET, FILM COATED ORAL at 21:07

## 2019-07-30 RX ADMIN — ACETAMINOPHEN 650 MG: 325 TABLET, FILM COATED ORAL at 21:07

## 2019-07-30 ASSESSMENT — ACTIVITIES OF DAILY LIVING (ADL)
RETIRED_EATING: 0-->INDEPENDENT
COGNITION: 0 - NO COGNITION ISSUES REPORTED
RETIRED_COMMUNICATION: 0-->UNDERSTANDS/COMMUNICATES WITHOUT DIFFICULTY
ADLS_ACUITY_SCORE: 16
FALL_HISTORY_WITHIN_LAST_SIX_MONTHS: NO
BATHING: 3-->ASSISTIVE EQUIPMENT AND PERSON
TRANSFERRING: 3-->ASSISTIVE EQUIPMENT AND PERSON
AMBULATION: 3-->ASSISTIVE EQUIPMENT AND PERSON
DRESS: 2-->ASSISTIVE PERSON
TOILETING: 2-->ASSISTIVE PERSON
SWALLOWING: 0-->SWALLOWS FOODS/LIQUIDS WITHOUT DIFFICULTY

## 2019-07-30 ASSESSMENT — ENCOUNTER SYMPTOMS
FATIGUE: 1
COUGH: 1
FEVER: 1

## 2019-07-30 NOTE — PHARMACY-ADMISSION MEDICATION HISTORY
Admission Medication History status for the 7/30/2019 admission is complete.  See EPIC admission navigator for Prior to Admission medications.    Medication history sources:  Patient    Medication history source reliability: Good    Medication adherence:  Moderate - patient reports forgetting her meds occassionally    Changes made to PTA medication list (reason)  Added: None  Deleted: None  Changed: None    Additional medication history information (including reliability of information, actions taken by pharmacist):   -Patient prescribed albuterol inhaler, benzonatate, cetirizine, guaifenesin, levofloxacin, and ondansetron yesterday 7/29 but has not picked up prescriptions yet.   -Stated last Botox injection was in April but no appointment scheduled to get another dose until September.   -Patient stated she is out of refills for levothyroxine so has been unable to take the past few days.     Time spent in this activity: 25 minutes    Medication history completed by: Zee Whiting PD3    Prior to Admission medications    Medication Sig Last Dose Taking? Auth Provider   acetaminophen (TYLENOL) 325 MG tablet Take 2 tablets (650 mg) by mouth every 4 hours as needed for mild pain or fever 7/30/2019 at Unknown time Yes Gina Eli PA-C   ferrous sulfate (FEROSUL) 325 (65 Fe) MG tablet Take 1 tablet (325 mg) by mouth every other day 7/29/2019 at Unknown time Yes Gina Eli PA-C   fluticasone (FLONASE) 50 MCG/ACT spray Spray 1-2 sprays into both nostrils daily Past Month at Unknown time Yes Gina Eli PA-C   levothyroxine (SYNTHROID/LEVOTHROID) 100 MCG tablet Take 1 tablet (100 mcg) by mouth daily Past Week at Unknown time Yes Gina Eli PA-C   vitamin D3 (CHOLECALCIFEROL) 2000 units (50 mcg) tablet Take 2 tablets (4,000 Units) by mouth daily Past Week at Unknown time Yes Gina Eli PA-C   albuterol (PROAIR HFA) 108 (90 Base) MCG/ACT inhaler Inhale 2 puffs into the lungs 2 times daily  for 7 days Not started  Jacobo Reynaga MD   benzonatate (TESSALON) 100 MG capsule Take 1 capsule (100 mg) by mouth 3 times daily as needed for cough Not started  Jacobo eRynaga MD   bisacodyl (DULCOLAX) 10 MG Suppository Place 1 suppository (10 mg) rectally daily as needed 7/28/2019  Lizy Myles MD   botulinum toxin type A (BOTOX) 100 UNITS injection Inject 400 Units into the muscle every 3 months 4/2019  Elio Dejesus MD   cetirizine (ZYRTEC) 10 MG tablet Take 1 tablet (10 mg) by mouth every evening Not started  Gina Eli PA-C   guaiFENesin (MUCINEX) 600 MG 12 hr tablet Take 1 tablet (600 mg) by mouth 2 times daily Not started  Gina Eli PA-C   levofloxacin (LEVAQUIN) 500 MG tablet Take 1 tablet (500 mg) by mouth daily for 10 days Not started  Jacobo Reynaga MD   ondansetron (ZOFRAN ODT) 4 MG ODT tab Take 1 tablet (4 mg) by mouth every 6 hours as needed for nausea or vomiting Not started  Jacobo Reynaga MD   order for DME Equipment being ordered: Gina Colvin PA-C   order for DME Equipment being ordered:  adult diapers for urinary incontinence - Fax to [employer] [phone]   Gina Eli PA-C

## 2019-07-30 NOTE — ED NOTES
Admitting MD at bedside speaking with pt and her family.  I received from Denzel SIMS RN and I assumed care of this pt.

## 2019-07-30 NOTE — ED NOTES
7/30/19 0810   Microbiology lab called to inform that one set of blood cultures grew gram negative rods. Dr. Ochoa notified. Instructed me to call patient and have her return to this ED today for follow up. I called pt and she stated that she would return to this ED today.

## 2019-07-30 NOTE — TELEPHONE ENCOUNTER
Our goal is to have forms completed within 72 hours, however some forms may require a visit or additional information.    What clinic location was the form placed at [facility] or [place].?     Who is the form from?   Where did the form come from? Faxed to clinic   The form was placed in the inbox of TONI Rodriguez      Please fax to [phone]  Phone number: [phone]    Additional comments: [employer] - underpad, durasorb 23 x 36 LT blue    Call take on 7/30/2019 at 2:21 PM by Sangeeta Beltran

## 2019-07-30 NOTE — RESULT ENCOUNTER NOTE
Emergency Dept/Urgent Care discharge antibiotic: Levofloxacin (Levaquin) 500 mg PO tablet, 1 tablet (500 mg) by mouth once daily for 10 days.  Date of Rx (If Applicable): 7/29/19  Recommendations per [facility] result protocol - Urine culture protocol.

## 2019-07-30 NOTE — RESULT ENCOUNTER NOTE
ED note from Rashi Noonan RN, copied and pasted below:    7/30/19 0810   Microbiology lab called to inform that one set of blood cultures grew gram negative rods. Dr. Ochoa notified. Instructed me to call patient and have her return to this ED today for follow up. I called pt and she stated that she would return to this ED today.   Rashi Noonan RN  Date of Service:  7/29/2019 11:00 PM  Creation Time:  7/30/2019  8:20 AM

## 2019-07-30 NOTE — ED NOTES
I called 10 to give report: charge RN will call back when the bed and nurse have been assigned for this pt.

## 2019-07-30 NOTE — ED NOTES
Pt requested that the rated of the NS bolus be slowed down. Rate decreased to 250 ml/hr and pt is tolerating this.  She denies any pain.
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
## 2019-07-30 NOTE — DISCHARGE INSTRUCTIONS
Keep hydrated    Check your culture results in 2 days.    Please make an appointment to follow up with Your Primary Care Provider in 10-14 days for recheck.    Return to the ER for worsening
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
## 2019-07-31 LAB
ALBUMIN SERPL-MCNC: 2.6 G/DL (ref 3.4–5)
ALP SERPL-CCNC: 112 U/L (ref 40–150)
ALT SERPL W P-5'-P-CCNC: 22 U/L (ref 0–50)
ANION GAP SERPL CALCULATED.3IONS-SCNC: 7 MMOL/L (ref 3–14)
AST SERPL W P-5'-P-CCNC: 21 U/L (ref 0–45)
BILIRUB SERPL-MCNC: 0.3 MG/DL (ref 0.2–1.3)
BUN SERPL-MCNC: 4 MG/DL (ref 7–30)
CALCIUM SERPL-MCNC: 7.7 MG/DL (ref 8.5–10.1)
CHLORIDE SERPL-SCNC: 110 MMOL/L (ref 94–109)
CO2 SERPL-SCNC: 25 MMOL/L (ref 20–32)
CREAT SERPL-MCNC: 0.44 MG/DL (ref 0.52–1.04)
CRP SERPL-MCNC: 115 MG/L (ref 0–8)
ERYTHROCYTE [DISTWIDTH] IN BLOOD BY AUTOMATED COUNT: 19.5 % (ref 10–15)
GFR SERPL CREATININE-BSD FRML MDRD: >90 ML/MIN/{1.73_M2}
GLUCOSE SERPL-MCNC: 91 MG/DL (ref 70–99)
HCT VFR BLD AUTO: 33 % (ref 35–47)
HGB BLD-MCNC: 9.9 G/DL (ref 11.7–15.7)
MCH RBC QN AUTO: 25 PG (ref 26.5–33)
MCHC RBC AUTO-ENTMCNC: 30 G/DL (ref 31.5–36.5)
MCV RBC AUTO: 83 FL (ref 78–100)
PLATELET # BLD AUTO: 195 10E9/L (ref 150–450)
POTASSIUM SERPL-SCNC: 3.5 MMOL/L (ref 3.4–5.3)
PROT SERPL-MCNC: 6.8 G/DL (ref 6.8–8.8)
RBC # BLD AUTO: 3.96 10E12/L (ref 3.8–5.2)
SODIUM SERPL-SCNC: 142 MMOL/L (ref 133–144)
TSH SERPL DL<=0.005 MIU/L-ACNC: 16.7 MU/L (ref 0.4–4)
WBC # BLD AUTO: 4.8 10E9/L (ref 4–11)

## 2019-07-31 PROCEDURE — 86140 C-REACTIVE PROTEIN: CPT | Performed by: INTERNAL MEDICINE

## 2019-07-31 PROCEDURE — 80053 COMPREHEN METABOLIC PANEL: CPT | Performed by: INTERNAL MEDICINE

## 2019-07-31 PROCEDURE — 85027 COMPLETE CBC AUTOMATED: CPT | Performed by: INTERNAL MEDICINE

## 2019-07-31 PROCEDURE — 25000128 H RX IP 250 OP 636: Performed by: INTERNAL MEDICINE

## 2019-07-31 PROCEDURE — 25000132 ZZH RX MED GY IP 250 OP 250 PS 637: Performed by: INTERNAL MEDICINE

## 2019-07-31 PROCEDURE — 84443 ASSAY THYROID STIM HORMONE: CPT | Performed by: INTERNAL MEDICINE

## 2019-07-31 PROCEDURE — 12000001 ZZH R&B MED SURG/OB UMMC

## 2019-07-31 PROCEDURE — 36415 COLL VENOUS BLD VENIPUNCTURE: CPT | Performed by: INTERNAL MEDICINE

## 2019-07-31 PROCEDURE — 99232 SBSQ HOSP IP/OBS MODERATE 35: CPT | Performed by: INTERNAL MEDICINE

## 2019-07-31 RX ORDER — HYDRALAZINE HYDROCHLORIDE 25 MG/1
25 TABLET, FILM COATED ORAL ONCE
Status: COMPLETED | OUTPATIENT
Start: 2019-07-31 | End: 2019-07-31

## 2019-07-31 RX ORDER — HYDRALAZINE HYDROCHLORIDE 25 MG/1
25 TABLET, FILM COATED ORAL EVERY 6 HOURS PRN
Status: DISCONTINUED | OUTPATIENT
Start: 2019-07-31 | End: 2019-08-02

## 2019-07-31 RX ORDER — METOPROLOL SUCCINATE 50 MG/1
50 TABLET, EXTENDED RELEASE ORAL DAILY
Status: DISCONTINUED | OUTPATIENT
Start: 2019-07-31 | End: 2019-08-01

## 2019-07-31 RX ORDER — CEFTRIAXONE 2 G/1
2 INJECTION, POWDER, FOR SOLUTION INTRAMUSCULAR; INTRAVENOUS EVERY 24 HOURS
Status: DISCONTINUED | OUTPATIENT
Start: 2019-07-31 | End: 2019-08-02 | Stop reason: HOSPADM

## 2019-07-31 RX ORDER — HYDRALAZINE HYDROCHLORIDE 10 MG/1
10 TABLET, FILM COATED ORAL EVERY 6 HOURS PRN
Status: DISCONTINUED | OUTPATIENT
Start: 2019-07-31 | End: 2019-07-31

## 2019-07-31 RX ADMIN — ALBUTEROL SULFATE 2 PUFF: 90 AEROSOL, METERED RESPIRATORY (INHALATION) at 00:27

## 2019-07-31 RX ADMIN — HYDRALAZINE HYDROCHLORIDE 10 MG: 10 TABLET, FILM COATED ORAL at 17:26

## 2019-07-31 RX ADMIN — METOPROLOL SUCCINATE 50 MG: 50 TABLET, EXTENDED RELEASE ORAL at 10:22

## 2019-07-31 RX ADMIN — ACETAMINOPHEN 650 MG: 325 TABLET, FILM COATED ORAL at 18:21

## 2019-07-31 RX ADMIN — HYDRALAZINE HYDROCHLORIDE 25 MG: 25 TABLET ORAL at 19:13

## 2019-07-31 RX ADMIN — LEVOTHYROXINE SODIUM 100 MCG: 100 TABLET ORAL at 10:22

## 2019-07-31 RX ADMIN — MELATONIN 4000 UNITS: at 10:22

## 2019-07-31 RX ADMIN — ALBUTEROL SULFATE 2 PUFF: 90 AEROSOL, METERED RESPIRATORY (INHALATION) at 19:13

## 2019-07-31 RX ADMIN — CEFTRIAXONE SODIUM 2 G: 2 INJECTION, POWDER, FOR SOLUTION INTRAMUSCULAR; INTRAVENOUS at 10:22

## 2019-07-31 RX ADMIN — ACETAMINOPHEN 650 MG: 325 TABLET, FILM COATED ORAL at 01:54

## 2019-07-31 RX ADMIN — CETIRIZINE HYDROCHLORIDE 10 MG: 10 TABLET, FILM COATED ORAL at 19:13

## 2019-07-31 ASSESSMENT — ACTIVITIES OF DAILY LIVING (ADL)
ADLS_ACUITY_SCORE: 26
ADLS_ACUITY_SCORE: 26
ADLS_ACUITY_SCORE: 22
ADLS_ACUITY_SCORE: 22
ADLS_ACUITY_SCORE: 28
ADLS_ACUITY_SCORE: 28

## 2019-07-31 NOTE — PROGRESS NOTES
Pt was seen, case reviewed with team.  Family member present    Pt feels fine, denies abd pain, nausea.  Appetite fair.  She denies headache.    She and family member notes that she is supposed to be taking a blood pressure medication but has been taking it infrequently.  Family unclear what medication, question labetalol, per epic    Maximum temperature was 101.5 at 2218 yesterday  Currently afebrile  Blood pressure 160s to 180s over 60s to 90s  Heart rate 90s      Patient is alert, fully oriented, appears comfortable, nontoxic  Lungs clear  Neck supple  CV RRR, no murmurs  Abdomen soft, nondistended  No lower extremity edema  Lower extremity paraplegia.  No lower extremity sensation      Assessment    E coli bacteremia likely secondary to UTI, clinically stable.  Vitals stable.  White blood cell count decreased.  Patient minimally symptomatic.  History of neurogenic bladder secondary to spinal cord injury, not recently at least evaluated.  No history of frequent UTI.  E. coli is pansensitive.  Patient received a dose of Rocephin IV on the day prior to admission, received a dose of Levaquin yesterday area and    Hypertension, chronic, history of noncompliance of medications.    Hypothyroidism with elevated TSH, on levothyroxine.  Unsure patient is been compliant with thyroid replacement.      Anemia, likely secondary to acute illness on chronic iron deficiency.    Plan  Resume IV Rocephin to minimize risk of muscle skeletal complications of quinolones.  Clarify with patient if she is taking her thyroid replacement  Monitor vitals, oral intake  Saline lock IV intake adequate  Possible discharge to home tomorrow on oral antibiotics.  Mechanical DVT proph while Pt is in bed  Start Toprol XL while Pt is in bed

## 2019-07-31 NOTE — PLAN OF CARE
[id number]: Pt's BP was still 176/66 after PRN hydralazine 10mg given and Temp increased to 102.9- pt is alert but reports HA, gave PRN Tylenol and notified MD.  MD placed another order for Hydralazine, continue to monitor BP and temp and give Ibuprofen if patient is still experiencing discomfort per MD.      VS:   Pt's BP elevated this am, rest of VSS, MD started pt on Metoprolol, was still 168/83 at noon.  After change of shift at 1600 pt's BP was elevated to 190/77 and temp 101.6- MD notified, patient is asymptomatic- PRN hydralazine 10mg ordered and given.     Output:   Patient is incontinent of urine- needs Ao2 for change/clean up.  Patient reported no BM for 3 days, wanted to use BSC- pt is para but was able to slide/lift into bariatric BSC from bed with Ao2 and gait belt.  Supp given PRN per pt request and had medium hard stool.     Lungs LS clear, denies SOB   Activity:   Turn/repo with Ao2 for bed mobility.  BSC with Ao2.     Skin: Skin intact   Pain:   Patient denies any pain on this shift   Neuro/CMS:   Pt A/Ox4, no sensation hips down- para    Diet:   Regular, needed assist to order meals- appetite is good, push fluids   LDA:   PIV right hand, SL   Plan:   Monitor fever trends/symptoms, monitor BP, continue to check/change/clean for urinary incontinence   Additional Info:   Pt is Senegalese speaking, used  phone, patient also has family at bedside.

## 2019-07-31 NOTE — ED NOTES
I spoke with ED physician regarding urine POCT.upt.  She verbalized that she would add a serum pregnancy test.
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
## 2019-08-01 ENCOUNTER — OFFICE VISIT (OUTPATIENT)
Dept: INTERPRETER SERVICES | Facility: CLINIC | Age: 34
End: 2019-08-01
Payer: COMMERCIAL

## 2019-08-01 LAB
BACTERIA SPEC CULT: ABNORMAL
CRP SERPL-MCNC: 87.1 MG/L (ref 0–8)
Lab: ABNORMAL
SPECIMEN SOURCE: ABNORMAL

## 2019-08-01 PROCEDURE — 12000001 ZZH R&B MED SURG/OB UMMC

## 2019-08-01 PROCEDURE — 25000132 ZZH RX MED GY IP 250 OP 250 PS 637: Performed by: INTERNAL MEDICINE

## 2019-08-01 PROCEDURE — T1013 SIGN LANG/ORAL INTERPRETER: HCPCS | Mod: U3

## 2019-08-01 PROCEDURE — 25000128 H RX IP 250 OP 636: Performed by: INTERNAL MEDICINE

## 2019-08-01 PROCEDURE — 86140 C-REACTIVE PROTEIN: CPT | Performed by: INTERNAL MEDICINE

## 2019-08-01 PROCEDURE — 99232 SBSQ HOSP IP/OBS MODERATE 35: CPT | Performed by: INTERNAL MEDICINE

## 2019-08-01 PROCEDURE — 36415 COLL VENOUS BLD VENIPUNCTURE: CPT | Performed by: INTERNAL MEDICINE

## 2019-08-01 RX ORDER — METOPROLOL SUCCINATE 50 MG/1
100 TABLET, EXTENDED RELEASE ORAL DAILY
Status: DISCONTINUED | OUTPATIENT
Start: 2019-08-01 | End: 2019-08-02 | Stop reason: HOSPADM

## 2019-08-01 RX ORDER — SODIUM CHLORIDE 9 MG/ML
INJECTION, SOLUTION INTRAVENOUS
Status: DISPENSED
Start: 2019-08-01 | End: 2019-08-02

## 2019-08-01 RX ORDER — SODIUM CHLORIDE 9 MG/ML
INJECTION, SOLUTION INTRAVENOUS
Status: DISPENSED
Start: 2019-08-01 | End: 2019-08-01

## 2019-08-01 RX ADMIN — METOPROLOL SUCCINATE 100 MG: 50 TABLET, EXTENDED RELEASE ORAL at 10:33

## 2019-08-01 RX ADMIN — CETIRIZINE HYDROCHLORIDE 10 MG: 10 TABLET, FILM COATED ORAL at 19:36

## 2019-08-01 RX ADMIN — ALBUTEROL SULFATE 2 PUFF: 90 AEROSOL, METERED RESPIRATORY (INHALATION) at 10:33

## 2019-08-01 RX ADMIN — BISACODYL 10 MG: 10 SUPPOSITORY RECTAL at 19:36

## 2019-08-01 RX ADMIN — CEFTRIAXONE SODIUM 2 G: 2 INJECTION, POWDER, FOR SOLUTION INTRAMUSCULAR; INTRAVENOUS at 10:35

## 2019-08-01 RX ADMIN — LEVOTHYROXINE SODIUM 100 MCG: 100 TABLET ORAL at 10:33

## 2019-08-01 RX ADMIN — ALBUTEROL SULFATE 2 PUFF: 90 AEROSOL, METERED RESPIRATORY (INHALATION) at 23:10

## 2019-08-01 RX ADMIN — ALBUTEROL SULFATE 2 PUFF: 90 AEROSOL, METERED RESPIRATORY (INHALATION) at 19:38

## 2019-08-01 RX ADMIN — MELATONIN 4000 UNITS: at 10:33

## 2019-08-01 ASSESSMENT — ACTIVITIES OF DAILY LIVING (ADL)
ADLS_ACUITY_SCORE: 28
ADLS_ACUITY_SCORE: 28
ADLS_ACUITY_SCORE: 32
ADLS_ACUITY_SCORE: 28
ADLS_ACUITY_SCORE: 28
ADLS_ACUITY_SCORE: 32

## 2019-08-01 NOTE — PROGRESS NOTES
Pt alert and oriented x4, denied pain, denied any concerns, states she feels better and hopes to go home tomorrow. No BM yet, BS active, passing gas. Appetite good, ate 100% breakfast. Fluids promoted. Pt Metoprolol increased to 100 mg today. Brother at bedside, pt seen with . IV is saline locked.

## 2019-08-01 NOTE — PLAN OF CARE
BP (!) 143/61 (BP Location: Left arm)   Pulse 89   Temp 98.6  F (37  C) (Oral)   Resp 16   SpO2 98% .LS clear,diminished on lower bases.Incentive spirometry use instructed,doing well reaching up to 1200ml level.Incontinent of urine midnight.She was assisted later to commode and voided more clear,yellow urine.No BM but passing gas,last  BM yesterday.Denies pain.PIV line R hand,saline locked.Denying nausea and reports eating better.Sleeping mostly through the night.Brother sleeping on recliner in room.

## 2019-08-01 NOTE — PROGRESS NOTES
Patient was seen, case reviewed with nursing staff.    Family was present, served as .    Patient feels improved, states she feels back to normal.  She denies back pain, nausea, vomiting, abdominal pain.  She has not had a bowel movement since admission.  She would like a portable commode for her home if possible.    She denies headache, vision changes.      Maximum temperature 102.9 degrees last evening, current temperature is 98.9 degrees  Blood pressures been elevated with systolics in the 140s to 170s, diastolics in the 60s to 110s  Alert, fully oriented, appears comfortable,  Appears well  Lungs clear  CV RRR, no murmurs  Abdomen soft, nondistended  Lower extremity edema    Results for JESSICA HAMPTON (MRN [id number]) as of 8/1/2019 12:42   Ref. Range 7/31/2019 06:38 8/1/2019 06:45   CRP Inflammation Latest Ref Range: 0.0 - 8.0 mg/L 115.0 (H) 87.1 (H)         Assessment    E. coli bacteremia secondary to UTI gradual clinical improvement with Rocephin IV.  Still with intermittent fevers though patient feels improved overall.  White blood cell count normalized.  CRP improved.  History of neurogenic bladder, but no past history of UTI per patient and family    Hypertension, chronic, suspect baseline poorly controlled as patient has not been taking medications as prescribed unclear if there is some component of autonomic dysreflexia as well.    Hypothyroidism, on replacement, with elevated TSH during this hospitalization.  Patient acknowledges incomplete compliance with thyroid replacement, has not taken medications for at least last few days prior to admission    Plan  Continue Rocephin 2 g IV today and tomorrow  Possible discharge to home tomorrow  If recurrent fevers, will obtain abdominal ultrasound to rule out structural renal process  Toprol- mg daily, may need to add a second agent such as amlodipine  Same thyroid replacement with outpatient follow-up

## 2019-08-02 VITALS
OXYGEN SATURATION: 97 % | TEMPERATURE: 97.6 F | HEART RATE: 69 BPM | RESPIRATION RATE: 16 BRPM | DIASTOLIC BLOOD PRESSURE: 74 MMHG | SYSTOLIC BLOOD PRESSURE: 150 MMHG

## 2019-08-02 PROCEDURE — 25000128 H RX IP 250 OP 636: Performed by: INTERNAL MEDICINE

## 2019-08-02 PROCEDURE — 25000132 ZZH RX MED GY IP 250 OP 250 PS 637: Performed by: INTERNAL MEDICINE

## 2019-08-02 PROCEDURE — 99238 HOSP IP/OBS DSCHRG MGMT 30/<: CPT | Performed by: INTERNAL MEDICINE

## 2019-08-02 RX ORDER — AMLODIPINE BESYLATE 2.5 MG/1
2.5 TABLET ORAL DAILY
Qty: 30 TABLET | Refills: 1 | Status: SHIPPED | OUTPATIENT
Start: 2019-08-02 | End: 2019-08-19

## 2019-08-02 RX ORDER — CEFDINIR 300 MG/1
300 CAPSULE ORAL 2 TIMES DAILY
Qty: 20 CAPSULE | Refills: 0 | Status: SHIPPED | OUTPATIENT
Start: 2019-08-03 | End: 2019-08-19

## 2019-08-02 RX ORDER — METOPROLOL SUCCINATE 100 MG/1
100 TABLET, EXTENDED RELEASE ORAL DAILY
Qty: 30 TABLET | Refills: 1 | Status: SHIPPED | OUTPATIENT
Start: 2019-08-02 | End: 2019-08-19

## 2019-08-02 RX ORDER — AMLODIPINE BESYLATE 2.5 MG/1
2.5 TABLET ORAL DAILY
Status: DISCONTINUED | OUTPATIENT
Start: 2019-08-02 | End: 2019-08-02 | Stop reason: HOSPADM

## 2019-08-02 RX ADMIN — MELATONIN 4000 UNITS: at 08:30

## 2019-08-02 RX ADMIN — CEFTRIAXONE SODIUM 2 G: 2 INJECTION, POWDER, FOR SOLUTION INTRAMUSCULAR; INTRAVENOUS at 08:31

## 2019-08-02 RX ADMIN — ALBUTEROL SULFATE 2 PUFF: 90 AEROSOL, METERED RESPIRATORY (INHALATION) at 08:29

## 2019-08-02 RX ADMIN — METOPROLOL SUCCINATE 100 MG: 50 TABLET, EXTENDED RELEASE ORAL at 08:31

## 2019-08-02 RX ADMIN — LEVOTHYROXINE SODIUM 100 MCG: 100 TABLET ORAL at 08:30

## 2019-08-02 RX ADMIN — AMLODIPINE BESYLATE 2.5 MG: 2.5 TABLET ORAL at 08:30

## 2019-08-02 ASSESSMENT — ACTIVITIES OF DAILY LIVING (ADL)
ADLS_ACUITY_SCORE: 32

## 2019-08-02 NOTE — PROGRESS NOTES
Care Coordinator - Discharge Planning    Admission Date/Time:  7/30/2019  Attending MD:  Armando Pisano MD     Data  Date of initial CC assessment:  Previous notes  Chart reviewed, discussed with interdisciplinary team.   Patient was admitted for:   1. Sepsis due to Escherichia coli (E. coli) (H)    2. Pyelonephritis    3. Essential hypertension         Assessment   Full assessment completed in previous note    Coordination of Care and Referrals: Provided patient/family with options for Home Care.  RNCC met with patient and relative at bedside that spoke English. Provided RX for BP cuff and verified that family has paper work for bariatric commode which they state they have someone to help with ordering and finding equipment. Family member verbalized understanding.  RNCC encouraged family to utilize Care Coordinator and  at their primary clinic also. RNCC @ [facility] is Vipin Deluca  [phone]      Referral forwarded to HC      Plan  Anticipated Discharge Date:  8/2/19  Anticipated Discharge Plan: home with SNV    CTS Handoff completed:  YES    Erica ROTHMANN RN CCM  RN Care Coordinator [address] [place] [id number]  [email or web address] [email or web address]  Office: [phone]  Pager: [phone]    To contact Weekend RNCC, dial * [phone] and enter job code [phone] at prompt. This pager can not be contacted by text page or outside line.
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
## 2019-08-02 NOTE — PLAN OF CARE
VS:   BP (!) 166/86   Pulse 68   Temp 98.2  F (36.8  C) (Oral)   Resp 16   SpO2 98%      Output:   Voids spontaneously, LBM 7/30/19   Lungs Cough and wheezing noted, patient has rescue inhaler    Activity:   Patient is a Para, activity as tolerated    Skin: Intact    Pain:   Denies    Neuro/CMS:   DALILA - Patient is Brazilian speaking. And she has no sensation below the waste    Dressing(s):   None    Diet:   Regular    LDA:   None - RN flyer notified that patient needs IV prior to ABX in the am.   Equipment:   IV pole and bedside commode    Plan:   Discharge once IV ABX are complete    Additional Info:   Patient has family at the bedside

## 2019-08-02 NOTE — PLAN OF CARE
VS:   BP (!) 150/74   Pulse 69   Temp 97.6  F (36.4  C) (Oral)   Resp 16   SpO2 97%      Output:   Incontinent diaper in place. Last BM 8/1/19   Lungs LS diminished at bases and coarse anterior    Activity:   Paraplegic- good upper body strength, turns in bed   Skin: Intact    Pain:   denies   Neuro/CMS:   A&Ox4. No sensation below the waist    Dressing(s):   none   Diet:   regular   LDA:   L PIV placed @ 8:30am for IV antibiotic    Equipment:   Bedside commode    Plan:   Discharge home after IV antibiotic    Additional Info:   Able to make needs known family at bedside       Pt. discharged at 1130 to HOME, was accompanied by family, and left with personal belongings. Pt. received complete discharge paperwork and ALL medications as filled by discharge pharmacy as well as script for at home blood pressure cuff and commode. Pt. was given times of last dose for all discharge medications in writing on discharge medication sheets. Discharge teaching included ALL medication, pain management, activity restrictions,  and signs and symptoms of infection. Pt. to follow up with Primary MD in two weeks. Pt. had no further questions at the time of discharge and no unmet needs were identified.   Family was at bedside to interpret discharge instructions, Pt declined formal  from hospital. Family verbalized understanding.

## 2019-08-02 NOTE — PLAN OF CARE
VS:   BP (!) 168/78 (BP Location: Left arm)   Pulse 79   Temp 98.2  F (36.8  C) (Oral)   Resp 16   SpO2 100%      Output:   Voiding adequately- diaper in place    Activity:   Paraplegic- good upper body strength, turns in bed   Skin: Intact    Pain:   Denies    Neuro/CMS:   A&Ox4, sensation absent waist down    Diet:   Regular    LDA:   Loss of PIV access- one will be place this AM for last dose of antibiotics    Equipment:   commode   Plan:   Discharge to home   Additional Info:   Able to make needs known, family at bedside

## 2019-08-04 ENCOUNTER — MEDICAL CORRESPONDENCE (OUTPATIENT)
Dept: HEALTH INFORMATION MANAGEMENT | Facility: CLINIC | Age: 34
End: 2019-08-04

## 2019-08-05 ENCOUNTER — TELEPHONE (OUTPATIENT)
Dept: FAMILY MEDICINE | Facility: CLINIC | Age: 34
End: 2019-08-05

## 2019-08-05 ENCOUNTER — PATIENT OUTREACH (OUTPATIENT)
Dept: CARE COORDINATION | Facility: CLINIC | Age: 34
End: 2019-08-05

## 2019-08-05 LAB
BACTERIA SPEC CULT: NO GROWTH
BACTERIA SPEC CULT: NO GROWTH
Lab: NORMAL
Lab: NORMAL
SPECIMEN SOURCE: NORMAL
SPECIMEN SOURCE: NORMAL

## 2019-08-05 NOTE — TELEPHONE ENCOUNTER
"Hospital/TCU/ED for chronic condition Discharge Protocol    \"Hi, my name is Mabelottoniel Vee, a registered nurse, and I am calling from [facility].  I am calling to follow up and see how things are going for you after your recent emergency visit/hospital/TCU stay.\"    Tell me how you are doing now that you are home?\"     I'm doing very well. I'm taking my medications as I've been instructed.       Discharge Instructions    \"Let's review your discharge instructions.  What is/are the follow-up recommendations?  Pt. Response:     They gave instructions of take antibiotics in morning and night and high BP medications.     \"Has an appointment with your primary care provider been scheduled?\"   No (schedule appointment)   Scheduled 8/19 at 3:40     \"When you see the provider, I would recommend that you bring your medications with you.\"    Medications    \"Tell me what changed about your medicines when you discharged?\"    Changes to chronic meds?    Antibiotic - Omnicef    Amlodipine - started for BP    Albuterol Inhaler - short term therapy    Metoprolol ER - started for BP    \"What questions do you have about your medications?\"    Should I take medications together?  - Questions cannot be easily answered - Epic MTM referral needed      Questions:    1. Should I continue taking both medications - for how long?   Advised to discuss with Gina Eli at appointment.      New diagnoses of heart failure, COPD, diabetes, or MI?    No              Post Discharge Medication Reconciliation Status: discharge medications reconciled and changed, per note/orders (see AVS).    Was MTM referral placed (*Make sure to put transitions as reason for referral)?   No - declines MTM referral.    Call Summary    \"What questions or concerns do you have about your recent visit and your follow-up care?\"     none     No other questions regarding hospital stay.   There was a medication I take as an injection - the doctor that was giving it, I " "was told that the doctor left and no longer works there. They gave appointment for a long time from now. Appointment is for every 3 months. Now, she hasn't seen one in almost three months. Next appointment will be 9/19. She is requesting an earlier appointment. Encouraged to contact clinic directly to get in if there is a cancellation or earlier appointment available.     \"If you have questions or things don't continue to improve, we encourage you contact us through the main clinic number (give number).  Even if the clinic is not open, triage nurses are available 24/7 to help you.     We would like you to know that our clinic has extended hours (provide information).  We also have urgent care (provide details on closest location and hours/contact info)\"      \"Thank you for your time and take care!\"             "

## 2019-08-05 NOTE — TELEPHONE ENCOUNTER
Reason for Call: Request for an order or referral:  Order or referral being requested: orders  Date needed: as soon as possible  Has the patient been seen by the PCP for this problem? YES  Additional comments: please call Noy from [facility]  Phone number Patient can be reached at:  Other phone number:  [phone]  Best Time:  any  Can we leave a detailed message on this number?  YES  Call taken on 8/5/2019 at 1:31 PM by RIVERA PATRICIA

## 2019-08-05 NOTE — TELEPHONE ENCOUNTER
Called Noy with verbal approval of SN orders.    SN 2 x a wk for 1 wk, 1 x a wk for 6 weeks and 3 PRN visits.

## 2019-08-19 ENCOUNTER — OFFICE VISIT (OUTPATIENT)
Dept: FAMILY MEDICINE | Facility: CLINIC | Age: 34
End: 2019-08-19
Payer: COMMERCIAL

## 2019-08-19 VITALS
TEMPERATURE: 99.7 F | RESPIRATION RATE: 16 BRPM | DIASTOLIC BLOOD PRESSURE: 82 MMHG | HEART RATE: 82 BPM | SYSTOLIC BLOOD PRESSURE: 132 MMHG | OXYGEN SATURATION: 100 %

## 2019-08-19 DIAGNOSIS — D50.8 IRON DEFICIENCY ANEMIA SECONDARY TO INADEQUATE DIETARY IRON INTAKE: ICD-10-CM

## 2019-08-19 DIAGNOSIS — E03.9 ACQUIRED HYPOTHYROIDISM: ICD-10-CM

## 2019-08-19 DIAGNOSIS — A41.51 SEPSIS DUE TO ESCHERICHIA COLI (E. COLI) (H): Primary | ICD-10-CM

## 2019-08-19 DIAGNOSIS — I10 ESSENTIAL HYPERTENSION: ICD-10-CM

## 2019-08-19 LAB
BASOPHILS # BLD AUTO: 0 10E9/L (ref 0–0.2)
BASOPHILS NFR BLD AUTO: 0.3 %
DIFFERENTIAL METHOD BLD: ABNORMAL
EOSINOPHIL # BLD AUTO: 0.6 10E9/L (ref 0–0.7)
EOSINOPHIL NFR BLD AUTO: 8.7 %
ERYTHROCYTE [DISTWIDTH] IN BLOOD BY AUTOMATED COUNT: 20 % (ref 10–15)
HCT VFR BLD AUTO: 36.1 % (ref 35–47)
HGB BLD-MCNC: 11.3 G/DL (ref 11.7–15.7)
LYMPHOCYTES # BLD AUTO: 2 10E9/L (ref 0.8–5.3)
LYMPHOCYTES NFR BLD AUTO: 29.8 %
MCH RBC QN AUTO: 26.9 PG (ref 26.5–33)
MCHC RBC AUTO-ENTMCNC: 31.3 G/DL (ref 31.5–36.5)
MCV RBC AUTO: 86 FL (ref 78–100)
MONOCYTES # BLD AUTO: 0.5 10E9/L (ref 0–1.3)
MONOCYTES NFR BLD AUTO: 7.2 %
NEUTROPHILS # BLD AUTO: 3.5 10E9/L (ref 1.6–8.3)
NEUTROPHILS NFR BLD AUTO: 54 %
PLATELET # BLD AUTO: 301 10E9/L (ref 150–450)
RBC # BLD AUTO: 4.2 10E12/L (ref 3.8–5.2)
WBC # BLD AUTO: 6.6 10E9/L (ref 4–11)

## 2019-08-19 PROCEDURE — 36415 COLL VENOUS BLD VENIPUNCTURE: CPT | Performed by: PHYSICIAN ASSISTANT

## 2019-08-19 PROCEDURE — 84443 ASSAY THYROID STIM HORMONE: CPT | Performed by: PHYSICIAN ASSISTANT

## 2019-08-19 PROCEDURE — 99214 OFFICE O/P EST MOD 30 MIN: CPT | Performed by: PHYSICIAN ASSISTANT

## 2019-08-19 PROCEDURE — 85025 COMPLETE CBC W/AUTO DIFF WBC: CPT | Performed by: PHYSICIAN ASSISTANT

## 2019-08-19 RX ORDER — AMLODIPINE BESYLATE 2.5 MG/1
2.5 TABLET ORAL DAILY
Qty: 30 TABLET | Refills: 11 | Status: SHIPPED | OUTPATIENT
Start: 2019-08-19 | End: 2019-09-18

## 2019-08-19 RX ORDER — METOPROLOL SUCCINATE 100 MG/1
100 TABLET, EXTENDED RELEASE ORAL DAILY
Qty: 30 TABLET | Refills: 11 | Status: SHIPPED | OUTPATIENT
Start: 2019-08-19 | End: 2019-09-18 | Stop reason: ALTCHOICE

## 2019-08-19 NOTE — LETTER
August 21, 2019      Teresita Bang  [address]  [place] [address]        Dear Teresita,    We are writing to inform you of your test results.    - Your TSH was normal, indicating that you are on the correct dose of thyroid medication.  - Your Blood Count Results show normal White Blood Cell count (no sign of infection), normal Hemoglobin (not anemia), and normal Platelets (affects clotting).    Results for orders placed or performed in visit on 08/19/19   TSH with free T4 reflex   Result Value Ref Range    TSH 2.73 0.40 - 4.00 mU/L   CBC with platelets differential   Result Value Ref Range    WBC 6.6 4.0 - 11.0 10e9/L    RBC Count 4.20 3.8 - 5.2 10e12/L    Hemoglobin 11.3 (L) 11.7 - 15.7 g/dL    Hematocrit 36.1 35.0 - 47.0 %    MCV 86 78 - 100 fl    MCH 26.9 26.5 - 33.0 pg    MCHC 31.3 (L) 31.5 - 36.5 g/dL    RDW 20.0 (H) 10.0 - 15.0 %    Platelet Count 301 150 - 450 10e9/L    % Neutrophils 54.0 %    % Lymphocytes 29.8 %    % Monocytes 7.2 %    % Eosinophils 8.7 %    % Basophils 0.3 %    Absolute Neutrophil 3.5 1.6 - 8.3 10e9/L    Absolute Lymphocytes 2.0 0.8 - 5.3 10e9/L    Absolute Monocytes 0.5 0.0 - 1.3 10e9/L    Absolute Eosinophils 0.6 0.0 - 0.7 10e9/L    Absolute Basophils 0.0 0.0 - 0.2 10e9/L    Diff Method Automated Method        Thank you for choosing [facility].  We appreciate the opportunity to serve you and look forward to supporting your healthcare needs in the future.    If you have any questions or concerns, please call me or my staff at [phone].      Sincerely,        Gina Eli PA-C

## 2019-08-19 NOTE — PATIENT INSTRUCTIONS
Phone number: [phone]  [facility]  [address] [facility] [place] [address]
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
## 2019-08-20 LAB — TSH SERPL DL<=0.005 MIU/L-ACNC: 2.73 MU/L (ref 0.4–4)

## 2019-08-21 ENCOUNTER — TELEPHONE (OUTPATIENT)
Dept: FAMILY MEDICINE | Facility: CLINIC | Age: 34
End: 2019-08-21

## 2019-08-21 NOTE — TELEPHONE ENCOUNTER
Message relayed with home care nurse, Simon.   No further action needed at this time. She will have patient schedule appt.

## 2019-08-21 NOTE — TELEPHONE ENCOUNTER
Reason for Call:  Other call back    Detailed comments: SABIHA Montes from [facility] called to report that Teresita's lungs have weezing, rales, wet cough.  Paras thinks she has a cold, but after coughing, sounds are still there.  She has no fever, had this weezing for 2-3 days.    Simon is asking what does the provider recommend.     Phone Number Patient can be reached at:   Nee: [phone]    Best Time: any    Can we leave a detailed message on this number? YES    Call taken on 8/21/2019 at 2:40 PM by Thais Frausto

## 2019-08-21 NOTE — TELEPHONE ENCOUNTER
Recommend use of OTC Mucinex to help with secretions. Drink plenty of fluids.    Patient with recent PNA, lungs clear on recheck earlier this week. Given findings today, would like patient to f/u in clinic for recheck - either myself or other provider. Ok for double-book on my schedule if needed.

## 2019-08-28 ENCOUNTER — ANCILLARY PROCEDURE (OUTPATIENT)
Dept: GENERAL RADIOLOGY | Facility: CLINIC | Age: 34
End: 2019-08-28
Attending: PHYSICIAN ASSISTANT
Payer: COMMERCIAL

## 2019-08-28 ENCOUNTER — OFFICE VISIT (OUTPATIENT)
Dept: FAMILY MEDICINE | Facility: CLINIC | Age: 34
End: 2019-08-28
Payer: COMMERCIAL

## 2019-08-28 VITALS
BODY MASS INDEX: 26.63 KG/M2 | SYSTOLIC BLOOD PRESSURE: 130 MMHG | TEMPERATURE: 98.7 F | DIASTOLIC BLOOD PRESSURE: 80 MMHG | RESPIRATION RATE: 18 BRPM | OXYGEN SATURATION: 97 % | WEIGHT: 160 LBS | HEART RATE: 85 BPM

## 2019-08-28 DIAGNOSIS — R05.9 COUGH: ICD-10-CM

## 2019-08-28 DIAGNOSIS — J18.9 PNEUMONIA OF RIGHT MIDDLE LOBE DUE TO INFECTIOUS ORGANISM: ICD-10-CM

## 2019-08-28 DIAGNOSIS — R05.9 COUGH: Primary | ICD-10-CM

## 2019-08-28 PROCEDURE — 99214 OFFICE O/P EST MOD 30 MIN: CPT | Performed by: PHYSICIAN ASSISTANT

## 2019-08-28 PROCEDURE — 71046 X-RAY EXAM CHEST 2 VIEWS: CPT | Mod: FY

## 2019-08-28 RX ORDER — LEVOFLOXACIN 500 MG/1
500 TABLET, FILM COATED ORAL DAILY
Qty: 7 TABLET | Refills: 0 | Status: SHIPPED | OUTPATIENT
Start: 2019-08-28 | End: 2019-09-18

## 2019-08-28 RX ORDER — ALBUTEROL SULFATE 90 UG/1
2 AEROSOL, METERED RESPIRATORY (INHALATION) EVERY 4 HOURS PRN
Qty: 1 INHALER | Refills: 0 | Status: SHIPPED | OUTPATIENT
Start: 2019-08-28 | End: 2019-09-18
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
## 2019-09-06 ENCOUNTER — TELEPHONE (OUTPATIENT)
Dept: FAMILY MEDICINE | Facility: CLINIC | Age: 34
End: 2019-09-06

## 2019-09-06 DIAGNOSIS — Z53.9 DIAGNOSIS NOT YET DEFINED: Primary | ICD-10-CM

## 2019-09-06 PROCEDURE — 99207 C MD CERTIFICATION HHA PATIENT: CPT | Performed by: FAMILY MEDICINE

## 2019-09-06 NOTE — TELEPHONE ENCOUNTER
Our goal is to have forms completed within 72 hours, however some forms may require a visit or additional information.    What clinic location was the form placed at [facility] or [place].?     Who is the form from?   Where did the form come from? Faxed to clinic   The form was placed in the inbox of Arturo Edmonds MD      Please fax to [phone]   Phone number:      Additional comments: [facility] 8/4/19 to 10/2/19    Call take on 9/6/2019 at 4:12 PM by Yanelis Cook

## 2019-09-17 ENCOUNTER — TELEPHONE (OUTPATIENT)
Dept: FAMILY MEDICINE | Facility: CLINIC | Age: 34
End: 2019-09-17

## 2019-09-17 NOTE — TELEPHONE ENCOUNTER
Our goal is to have forms completed within 72 hours, however some forms may require a visit or additional information.    What clinic location was the form placed at [facility] or [place].?     Who is the form from?   Where did the form come from? Faxed to clinic   The form was placed in the inbox of TONI Rodriguez      Please fax to [phone]  Phone number: [phone]    Additional comments: [facility] - prescription order    Call take on 9/17/2019 at 4:50 PM by Sangeeta Beltran

## 2019-09-18 ENCOUNTER — OFFICE VISIT (OUTPATIENT)
Dept: FAMILY MEDICINE | Facility: CLINIC | Age: 34
End: 2019-09-18
Payer: COMMERCIAL

## 2019-09-18 VITALS
TEMPERATURE: 97 F | HEART RATE: 75 BPM | SYSTOLIC BLOOD PRESSURE: 138 MMHG | OXYGEN SATURATION: 96 % | RESPIRATION RATE: 16 BRPM | DIASTOLIC BLOOD PRESSURE: 80 MMHG

## 2019-09-18 DIAGNOSIS — I10 ESSENTIAL HYPERTENSION: ICD-10-CM

## 2019-09-18 DIAGNOSIS — J40 BRONCHITIS: Primary | ICD-10-CM

## 2019-09-18 PROCEDURE — 99214 OFFICE O/P EST MOD 30 MIN: CPT | Performed by: PHYSICIAN ASSISTANT

## 2019-09-18 RX ORDER — AMLODIPINE BESYLATE 5 MG/1
5 TABLET ORAL DAILY
Qty: 90 TABLET | Refills: 11 | Status: SHIPPED | OUTPATIENT
Start: 2019-09-18 | End: 2020-07-14

## 2019-09-18 RX ORDER — ALBUTEROL SULFATE 90 UG/1
2 AEROSOL, METERED RESPIRATORY (INHALATION) EVERY 4 HOURS PRN
Qty: 1 INHALER | Refills: 0 | Status: SHIPPED | OUTPATIENT
Start: 2019-09-18 | End: 2020-06-10

## 2019-09-18 RX ORDER — PREDNISONE 20 MG/1
20 TABLET ORAL DAILY
Qty: 5 TABLET | Refills: 0 | Status: SHIPPED | OUTPATIENT
Start: 2019-09-18 | End: 2019-09-25
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
## 2019-09-19 ENCOUNTER — OFFICE VISIT (OUTPATIENT)
Dept: PHYSICAL MEDICINE AND REHAB | Facility: CLINIC | Age: 34
End: 2019-09-19
Payer: COMMERCIAL

## 2019-09-19 VITALS
TEMPERATURE: 98 F | SYSTOLIC BLOOD PRESSURE: 170 MMHG | RESPIRATION RATE: 16 BRPM | OXYGEN SATURATION: 97 % | HEART RATE: 83 BPM | DIASTOLIC BLOOD PRESSURE: 89 MMHG

## 2019-09-19 DIAGNOSIS — G82.22 CHRONIC INCOMPLETE SPASTIC PARAPLEGIA (H): Primary | ICD-10-CM

## 2019-09-19 DIAGNOSIS — S24.109S INJURY OF THORACIC SPINAL CORD, SEQUELA (H): ICD-10-CM

## 2019-09-19 ASSESSMENT — PAIN SCALES - GENERAL: PAINLEVEL: NO PAIN (0)

## 2019-09-19 NOTE — PROGRESS NOTES
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 She is accompanied by sister, cousin, and [place]  today. She is new to my clinic and was last seen by Dr. Dejesus on 4/18/2019 at which time he utilized 400 units of Botox divided evenly between the right and left leg hamstrings.  With more historical injections they had done both hamstrings and abductors that shifted to hamstrings only last time she was reporting well controlled adduction.  She is very pleased with the results last time.  Denies any side effects.  She believes the benefits started to wear off at 12 week time point.  Severity of spasticity is much improved with the botulinum toxin.    Equipment, has new power WC (2018). Arm support is currently being repaired in that chair and she is using old power Timeful. She transfers to bed via slide board. Does not use braces of any kind.     Medically, she was hospitalized for UTI and septicemia in July. Recovered from that and is now without any urinary issues. She was also diagnosed with pneumonia in late August and treated with a course of Levaquin. She has ongoing cough and was started on 5 day course of prenisone, and albuterol yesterday for bronchitis. For her HTN, metoprolol stopped yesterday and amlodipine increased to 5 mg daily.     Physical exam:  BP (!) 170/89 (BP Location: Right arm, Patient Position: Chair, Cuff Size: Adult Regular)   Pulse 83   Temp 98  F (36.7  C) (Oral)   Resp 16   SpO2 97%      She was alert pleasant no diaphoresis, resting comfortably in power Timeful. We use a [place]  for communication   Involuntary spasms are noted in her bilateral lower extremities. These exacerbate with attempted passive range of motion, especially in quads b/l. Has both flexion and extension spasticity patterns. No adductor  spasms noted and resting position is with legs comfortable wide.  Modified Lorin 3/4 in both legs quads and hamstrings above the knee, 4/4 versus fixed contracture in the bilateral ankles and toes with severe mid-foot / ankle plantar flexion inversion positioning.   Overlying skin in the thighs hamstrings is healthy without lesions or evidence of skin breakdown.    Assessment and recommendations:  1. Teresita has severe spasticity in her lower extremities related to her spinal cord injury and has benefited from chemodenervation with botulinum toxin in the past.  We will repeat these injections today. She for sure has knee extension tone but has articulated the knee flexion tone is the more problematic functionally.  2. Will continue with hamstrings, add b/l gastrocnemius, and continue to forego adductors  3. She should continue with positioning and stretching exercises.  4. Follow-up in 3 months time, will call sooner if any side effects or other concerns arise.       Procedure: Chemodenervation to bilateral lower extremities utilizing onabotulinum toxin type A, Botox. I reviewed at length the risks benefits and potential time course for onset and duration. She agrees and signed the consent form today with her single initial.   present and answered all questions for this process. Total 400 units of Botox lot  C3 expiration 2022 was utilized. NDC #9653-1462-74. Diluted with preservative-free normal saline ratio of 100 units per milliliter. This was divided into 2 syringes. 27-gauge recording EMG needle was utilized in conjunction with EMG guidance to identify the most active motor units while avoiding surrounding structures. Today transferred to bed for injections. All areas were cleansed with ChloraPrep. The following muscles were then injected.    Left le. Hamstring, 100 units divided over 2 sites  2. Gastrocnemius, 100 units divided over 2 sites    Right le. Hamstring, 100 units  divided over 2 sites  2. Gastrocnemius, 100 units divided over 2 sites      Besides some involuntary spasms that occur during the needle procedure, she tolerated very well.        Note prepared by Joby Cole, MS4, [employer].      Physician Attestation   I, Kathryn Prado, was present with the medical student who participated in the service and in the documentation of the note.  I have verified the history and personally performed the physical exam and medical decision making.  I agree with the assessment and plan of care as documented in the note.      I personally reviewed vital signs and medications.    Teresita continues to have good response to the botulinum toxin injections for controlling her symptoms. Contractures and deformities in her feet/ankle are not expected to change with this procedure. She has had multiple medical issues over the past 3 months and most likely deconditioned. Still recovering from recent pneumonia. Consider to start PT at next visit for conditioning and also to review her HEP. Also needs f/u with PCP for screening for bone density given the risk of osteoporosis in chronic SCI patients. Vitamin D level was 18 in June and she is on supplements.   We rechecked her BP before we started the procedure and SBP was 160.     Kathryn Prado MD  Date of Service (when I saw the patient): 9/19/19

## 2019-09-19 NOTE — NURSING NOTE
Chief Complaint   Patient presents with     RECHECK     ump return botox    Marguerite Skaggs cma

## 2019-09-19 NOTE — LETTER
9/19/2019       RE: Teresita Bang  [address]  [place] [address]     Dear Colleague,    Thank you for referring your patient, Teresita Bang, to the [facility] PHYSICAL MEDICINE AND REHABILITATION at [facility]. Please see a copy of my visit note below.    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 She is accompanied by sister, cousin, and [place]  today. She is new to my clinic and was last seen by Dr. Dejesus on 4/18/2019 at which time he utilized 400 units of Botox divided evenly between the right and left leg hamstrings.  With more historical injections they had done both hamstrings and abductors that shifted to hamstrings only last time she was reporting well controlled adduction.  She is very pleased with the results last time.  Denies any side effects.  She believes the benefits started to wear off at 12 week time point.  Severity of spasticity is much improved with the botulinum toxin.    Equipment, has new power [employer] (2018). Arm support is currently being repaired in that chair and she is using old power [employer]. She transfers to bed via slide board. Does not use braces of any kind.     Medically, she was hospitalized for UTI and septicemia in July. Recovered from that and is now without any urinary issues. She was also diagnosed with pneumonia in late August and treated with a course of Levaquin. She has ongoing cough and was started on 5 day course of prenisone, and albuterol yesterday for bronchitis. For her HTN, metoprolol stopped yesterday and amlodipine increased to 5 mg daily.     Physical exam:  BP (!) 170/89 (BP Location: Right arm, Patient Position: Chair, Cuff Size: Adult Regular)   Pulse 83   Temp 98  F (36.7  C) (Oral)   Resp 16   SpO2 97%      She was alert  pleasant no diaphoresis, resting comfortably in power WC. We use a [employer]  for communication   Involuntary spasms are noted in her bilateral lower extremities. These exacerbate with attempted passive range of motion, especially in quads b/l. Has both flexion and extension spasticity patterns. No adductor spasms noted and resting position is with legs comfortable wide.  Modified Lorin 3/4 in both legs quads and hamstrings above the knee, 4/4 versus fixed contracture in the bilateral ankles and toes with severe mid-foot / ankle plantar flexion inversion positioning.   Overlying skin in the thighs hamstrings is healthy without lesions or evidence of skin breakdown.    Assessment and recommendations:  1. Teresita has severe spasticity in her lower extremities related to her spinal cord injury and has benefited from chemodenervation with botulinum toxin in the past.  We will repeat these injections today. She for sure has knee extension tone but has articulated the knee flexion tone is the more problematic functionally.  2. Will continue with hamstrings, add b/l gastrocnemius, and continue to forego adductors  3. She should continue with positioning and stretching exercises.  4. Follow-up in 3 months time, will call sooner if any side effects or other concerns arise.     Procedure: Chemodenervation to bilateral lower extremities utilizing onabotulinum toxin type A, Botox. I reviewed at length the risks benefits and potential time course for onset and duration. She agrees and signed the consent form today with her single initial.   present and answered all questions for this process. Total 400 units of Botox lot  C3 expiration 03/2022 was utilized. NDC #3186-4489-76. Diluted with preservative-free normal saline ratio of 100 units per milliliter. This was divided into 2 syringes. 27-gauge recording EMG needle was utilized in conjunction with EMG guidance to identify the most active motor units  while avoiding surrounding structures. Today transferred to bed for injections. All areas were cleansed with ChloraPrep. The following muscles were then injected.    Left le. Hamstring, 100 units divided over 2 sites  2. Gastrocnemius, 100 units divided over 2 sites    Right le. Hamstring, 100 units divided over 2 sites  2. Gastrocnemius, 100 units divided over 2 sites    Besides some involuntary spasms that occur during the needle procedure, she tolerated very well.    Note prepared by Joby Cole, MS4, [employer].    Physician Attestation   I, Kathryn Prado, was present with the medical student who participated in the service and in the documentation of the note.  I have verified the history and personally performed the physical exam and medical decision making.  I agree with the assessment and plan of care as documented in the note.      I personally reviewed vital signs and medications.    Teresita continues to have good response to the botulinum toxin injections for controlling her symptoms. Contractures and deformities in her feet/ankle are not expected to change with this procedure. She has had multiple medical issues over the past 3 months and most likely deconditioned. Still recovering from recent pneumonia. Consider to start PT at next visit for conditioning and also to review her HEP. Also needs f/u with PCP for screening for bone density given the risk of osteoporosis in chronic SCI patients. Vitamin D level was 18 in  and she is on supplements.   We rechecked her BP before we started the procedure and SBP was 160.     Kathryn Prado MD  Date of Service (when I saw the patient): 19

## 2019-09-25 ENCOUNTER — OFFICE VISIT (OUTPATIENT)
Dept: FAMILY MEDICINE | Facility: CLINIC | Age: 34
End: 2019-09-25
Payer: COMMERCIAL

## 2019-09-25 VITALS
HEIGHT: 65 IN | SYSTOLIC BLOOD PRESSURE: 130 MMHG | TEMPERATURE: 97.4 F | RESPIRATION RATE: 16 BRPM | OXYGEN SATURATION: 100 % | DIASTOLIC BLOOD PRESSURE: 78 MMHG | WEIGHT: 160 LBS | HEART RATE: 77 BPM | BODY MASS INDEX: 26.66 KG/M2

## 2019-09-25 DIAGNOSIS — Z23 NEED FOR PROPHYLACTIC VACCINATION AND INOCULATION AGAINST INFLUENZA: ICD-10-CM

## 2019-09-25 DIAGNOSIS — J30.89 SEASONAL ALLERGIC RHINITIS DUE TO OTHER ALLERGIC TRIGGER: ICD-10-CM

## 2019-09-25 DIAGNOSIS — J45.30 MILD PERSISTENT REACTIVE AIRWAY DISEASE WITHOUT COMPLICATION: Primary | ICD-10-CM

## 2019-09-25 PROCEDURE — 90471 IMMUNIZATION ADMIN: CPT | Performed by: PHYSICIAN ASSISTANT

## 2019-09-25 PROCEDURE — 99214 OFFICE O/P EST MOD 30 MIN: CPT | Mod: 25 | Performed by: PHYSICIAN ASSISTANT

## 2019-09-25 PROCEDURE — 90686 IIV4 VACC NO PRSV 0.5 ML IM: CPT | Performed by: PHYSICIAN ASSISTANT

## 2019-09-25 RX ORDER — MONTELUKAST SODIUM 10 MG/1
10 TABLET ORAL AT BEDTIME
Qty: 90 TABLET | Refills: 11 | Status: SHIPPED | OUTPATIENT
Start: 2019-09-25 | End: 2021-07-26

## 2019-09-25 RX ORDER — FLUTICASONE PROPIONATE 50 MCG
1 SPRAY, SUSPENSION (ML) NASAL 2 TIMES DAILY
Qty: 16 G | Refills: 11 | Status: SHIPPED | OUTPATIENT
Start: 2019-09-25 | End: 2021-11-02

## 2019-09-25 ASSESSMENT — ASTHMA QUESTIONNAIRES
QUESTION_5 LAST FOUR WEEKS HOW WOULD YOU RATE YOUR ASTHMA CONTROL: WELL CONTROLLED
QUESTION_3 LAST FOUR WEEKS HOW OFTEN DID YOUR ASTHMA SYMPTOMS (WHEEZING, COUGHING, SHORTNESS OF BREATH, CHEST TIGHTNESS OR PAIN) WAKE YOU UP AT NIGHT OR EARLIER THAN USUAL IN THE MORNING: ONCE OR TWICE
QUESTION_1 LAST FOUR WEEKS HOW MUCH OF THE TIME DID YOUR ASTHMA KEEP YOU FROM GETTING AS MUCH DONE AT WORK, SCHOOL OR AT HOME: NONE OF THE TIME
ACUTE_EXACERBATION_TODAY: NO
ACT_TOTALSCORE: 20
QUESTION_2 LAST FOUR WEEKS HOW OFTEN HAVE YOU HAD SHORTNESS OF BREATH: NOT AT ALL
QUESTION_4 LAST FOUR WEEKS HOW OFTEN HAVE YOU USED YOUR RESCUE INHALER OR NEBULIZER MEDICATION (SUCH AS ALBUTEROL): ONE OR TWO TIMES PER DAY

## 2019-09-25 ASSESSMENT — MIFFLIN-ST. JEOR: SCORE: 1416.64
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
## 2019-09-26 ENCOUNTER — TELEPHONE (OUTPATIENT)
Dept: FAMILY MEDICINE | Facility: CLINIC | Age: 34
End: 2019-09-26

## 2019-09-26 DIAGNOSIS — J45.30 MILD PERSISTENT REACTIVE AIRWAY DISEASE WITHOUT COMPLICATION: Primary | ICD-10-CM

## 2019-09-26 ASSESSMENT — ASTHMA QUESTIONNAIRES: ACT_TOTALSCORE: 20

## 2019-09-26 NOTE — TELEPHONE ENCOUNTER
Prior Authorization Retail Medication Request    Medication/Dose: fluticasone (ARNUITY ELLIPTA) 50 MCG/ACT inhaler   ICD code (if different than what is on RX):     Previously Tried and Failed:     Rationale:       Insurance Name:     Insurance ID:         Pharmacy Information (if different than what is on RX)  Name:     Phone:

## 2019-09-30 NOTE — TELEPHONE ENCOUNTER
CENTRAL PRIOR AUTHORIZATION  [phone]    PA Initiation    Medication: fluticasone (ARNUITY ELLIPTA) 50 MCG/ACT inhaler  Insurance Company: [employer] - Phone [phone] Fax [phone]  Pharmacy Filling the Rx: [facility] - [place], [place] - [address]  Filling Pharmacy Phone: [phone]  Filling Pharmacy Fax:    Start Date: 9/30/2019

## 2019-10-03 NOTE — TELEPHONE ENCOUNTER
PRIOR AUTHORIZATION DENIED    Medication: fluticasone (ARNUITY ELLIPTA) 50 MCG/ACT inhaler - DENIED     Denial Date: 9/30/2019    Denial Rational: The patient needs to have tried and failed 2 or more preferred medications that are chemically unique. Some examples of preferred medications are: Asmanex, Budesonide, Dulera, Flovent HFA, Pulmicort Flexhaler, Symbicort.        Appeal Information: IF THE PROVIDER WOULD LIKE APPEAL THIS DENIAL, PLEASE HAVE THEM PROVIDE A LETTER OF MEDICAL NECESSITY ALONG WITH ANY DOCUMENTATION THAT STATES THERAPIES TRIED/OUTCOMES. ONCE IT HAS BEEN PLACED IN THE PATIENT'S CHART, PLEASE NOTIFY THE PA TEAM ONCE IT HAS BEEN COMPLETED AND WE CAN INITIATE THE APPEAL ON BEHALF OF THE PROVIDER AND PATIENT.

## 2019-10-04 ENCOUNTER — TELEPHONE (OUTPATIENT)
Dept: FAMILY MEDICINE | Facility: CLINIC | Age: 34
End: 2019-10-04

## 2019-10-04 RX ORDER — FLUTICASONE PROPIONATE 44 UG/1
1 AEROSOL, METERED RESPIRATORY (INHALATION) 2 TIMES DAILY
Qty: 1 INHALER | Refills: 0 | Status: SHIPPED | OUTPATIENT
Start: 2019-10-04 | End: 2019-10-09

## 2019-10-04 NOTE — TELEPHONE ENCOUNTER
Our goal is to have forms completed within 72 hours, however some forms may require a visit or additional information.    What clinic location was the form placed at [facility] or [place]     Who is the form from?   Where did the form come from? 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

## 2019-10-09 ENCOUNTER — OFFICE VISIT (OUTPATIENT)
Dept: FAMILY MEDICINE | Facility: CLINIC | Age: 34
End: 2019-10-09
Payer: COMMERCIAL

## 2019-10-09 VITALS
OXYGEN SATURATION: 97 % | WEIGHT: 160 LBS | HEIGHT: 65 IN | DIASTOLIC BLOOD PRESSURE: 79 MMHG | RESPIRATION RATE: 18 BRPM | HEART RATE: 96 BPM | BODY MASS INDEX: 26.66 KG/M2 | SYSTOLIC BLOOD PRESSURE: 138 MMHG

## 2019-10-09 DIAGNOSIS — N39.45 CONTINUOUS LEAKAGE(788.37): ICD-10-CM

## 2019-10-09 DIAGNOSIS — J30.89 SEASONAL ALLERGIC RHINITIS DUE TO OTHER ALLERGIC TRIGGER: ICD-10-CM

## 2019-10-09 DIAGNOSIS — G82.20 PARAPLEGIA (H): ICD-10-CM

## 2019-10-09 DIAGNOSIS — J45.30 MILD PERSISTENT REACTIVE AIRWAY DISEASE WITHOUT COMPLICATION: Primary | ICD-10-CM

## 2019-10-09 PROCEDURE — 99214 OFFICE O/P EST MOD 30 MIN: CPT | Performed by: PHYSICIAN ASSISTANT

## 2019-10-09 RX ORDER — FLUTICASONE PROPIONATE 44 UG/1
1 AEROSOL, METERED RESPIRATORY (INHALATION) 2 TIMES DAILY
Qty: 3 INHALER | Refills: 11 | Status: SHIPPED | OUTPATIENT
Start: 2019-10-09 | End: 2021-03-22

## 2019-10-09 ASSESSMENT — MIFFLIN-ST. JEOR: SCORE: 1416.64

## 2019-10-09 NOTE — PROGRESS NOTES
"Subjective     Teresita Bang is a 34 year old female who presents to clinic today for the following health issues:    HPI   Tobacco  Allergies  Meds  Problems  Med Hx  Surg Hx  Fam Hx     RESPIRATORY SYMPTOMS F/U      Duration: x2 months    Description  nasal congestion, cough, wheezing and fatigue/malaise    Severity: mild    Accompanying signs and symptoms: see above    History (predisposing factors):  Patient was last seen 9/25/19, symptoms have improved but not resolved since then    Precipitating or alleviating factors: was on prednisone for from 9/18-9/23    Therapies tried and outcome:  none     is present.   - Started on Flovent last visit for reactive airway disease but never picked it up from the pharmacy  - Has been using albuterol BID with better relief  - Singulair has helped with congestion  - Still has some lingering cough and chest tightness  - requests new rx for Chux with quantity increase    Review of Systems   ROS COMP: Constitutional, HEENT, cardiovascular, pulmonary, GI, , musculoskeletal, neuro, skin, endocrine and psych systems are negative, except as otherwise noted.      Objective    /79   Pulse 96   Resp 18   Ht 1.651 m (5' 5\")   Wt 72.6 kg (160 lb)   SpO2 97%   BMI 26.63 kg/m    Body mass index is 26.63 kg/m .  Physical Exam   GENERAL:  WDWN, no acute distress  PSYCH: pleasant, cooperative  EYES: no discharge, no injection  HENT:  Normocephalic. Moist mucus membranes.  NECK:  Supple, symmetric  LUNGS: Good respiratory effort. Diminished breath sounds throughout with expiratory wheeze in bilat bases. No rhonchi or rales  HEART:  Regular rate & rhythm. No murmur, gallop, or rub.  EXTREMITIES: paraplegic, resting comfortably in power wheelchair  SKIN:  Warm and dry, no rash or suspicious lesions    NEUROLOGIC:  alert, sensation grossly intact.    Diagnostic Test Results:  none         Assessment & Plan       ICD-10-CM    1. Mild persistent reactive airway " disease without complication J45.30 fluticasone (FLOVENT HFA) 44 MCG/ACT inhaler   2. Seasonal allergic rhinitis due to other allergic trigger J30.89    3. Paraplegia (H) G82.20 order for DME   4. Continuous leakage(788.37) N39.45 order for DME     - Reviewed role of ICS in mgmt of RAD, rather than albuterol alone. Start Flovent BID, continue albuterol prn  - Continue Singular at bedtime at least until winter  - Chux rx faxed to [facility]    Return in about 1 week (around 10/16/2019), or if symptoms worsen or fail to improve.    Gina Eli PA-C  [facility]

## 2019-11-01 DIAGNOSIS — K59.09 OTHER CONSTIPATION: ICD-10-CM

## 2019-11-01 NOTE — TELEPHONE ENCOUNTER
"Requested Prescriptions   Pending Prescriptions Disp Refills     bisacodyl (DULCOLAX) 10 MG suppository  Last Written Prescription Date:  8/1/2018  Last Fill Quantity: 30 suppository,  # refills: 2   Last office visit: 10/9/2019 with prescribing provider:  Sreedhar   Future Office Visit:     30 suppository 2     Sig: Place 1 suppository (10 mg) rectally daily as needed       Laxatives Protocol Passed - 11/1/2019  3:29 PM        Passed - Patient is age 6 or older        Passed - Recent (12 mo) or future (30 days) visit within the authorizing provider's specialty     Patient has had an office visit with the authorizing provider or a provider within the authorizing providers department within the previous 12 mos or has a future within next 30 days. See \"Patient Info\" tab in inbasket, or \"Choose Columns\" in Meds & Orders section of the refill encounter.              Passed - Medication is active on med list           "

## 2019-11-01 NOTE — TELEPHONE ENCOUNTER
Reason for Call:  Medication or medication refill:  Do you use a [facility]?  Name of the pharmacy and phone number for the current request:  [facility] - [place], [place] - [facility] [address]  [phone]  Name of the medication requested:    bisacodyl (DULCOLAX) 10 MG Suppository     Other request: none  Can we leave a detailed message on this number? YES  Phone number patient can be reached at: Home number on file [phone] (home)  Best Time: any  Call taken on 11/1/2019 at 3:28 PM by RIVERA PATRICIA

## 2019-11-04 RX ORDER — BISACODYL 10 MG
10 SUPPOSITORY, RECTAL RECTAL DAILY PRN
Qty: 30 SUPPOSITORY | Refills: 11 | Status: SHIPPED | OUTPATIENT
Start: 2019-11-04 | End: 2020-11-23

## 2019-11-04 NOTE — TELEPHONE ENCOUNTER
Left a detailed voice message informing pt Rx was sent to [facility] pharmacy. She may check with them when ready for .

## 2019-11-04 NOTE — TELEPHONE ENCOUNTER
Routing refill request to provider for review/approval because:  Medication hasn't been ordered by PCP.

## 2019-12-19 ENCOUNTER — OFFICE VISIT (OUTPATIENT)
Dept: PHYSICAL MEDICINE AND REHAB | Facility: CLINIC | Age: 34
End: 2019-12-19
Payer: COMMERCIAL

## 2019-12-19 VITALS
OXYGEN SATURATION: 96 % | HEART RATE: 82 BPM | SYSTOLIC BLOOD PRESSURE: 160 MMHG | DIASTOLIC BLOOD PRESSURE: 87 MMHG | TEMPERATURE: 98 F

## 2019-12-19 DIAGNOSIS — M62.838 MUSCLE SPASTICITY: ICD-10-CM

## 2019-12-19 DIAGNOSIS — G82.22 CHRONIC INCOMPLETE SPASTIC PARAPLEGIA (H): Primary | ICD-10-CM

## 2019-12-19 DIAGNOSIS — S24.109S INJURY OF THORACIC SPINAL CORD, SEQUELA (H): ICD-10-CM

## 2019-12-19 ASSESSMENT — PAIN SCALES - GENERAL: PAINLEVEL: NO PAIN (0)

## 2019-12-19 NOTE — PROGRESS NOTES
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 She is accompanied by her brother, and Monegasque  today. She was followed by Dr. Dejesus and was last seen in our clinic 9/19/19.  With more historical injections they had done both hamstrings and adductors that shifted to hamstrings only as she was reporting well controlled adduction. We added gastrocnemius muscles b/l at last visit. She is very pleased with the results last time.  Denies any side effects.  She believes the benefits started to wear off at 12 week time point.  Severity of spasticity is much improved with the botulinum toxin.    Equipment, has new power Ziplocal (2018). Arm support is currently being repaired in that chair and she is using old power Ziplocal. She transfers to bed via slide board. Does not use braces of any kind.     Medically, she was hospitalized for UTI and septicemia in July. Recovered from that and is now without any urinary issues. She was also diagnosed with pneumonia in late August and treated with a course of Levaquin. She is followed by her PCP regarding HTN and HLD management.       Physical exam:  BP (!) 160/87 (BP Location: Right arm, Patient Position: Sitting, Cuff Size: Adult Regular)   Pulse 82   Temp 98  F (36.7  C)   SpO2 96%      She was alert pleasant no diaphoresis, resting comfortably in power WC. We use a Monegasque  for communication   Involuntary spasms are noted in her bilateral lower extremities. These exacerbate with attempted passive range of motion, especially in quads b/l. Has both flexion and extension spasticity patterns. No adductor spasms noted and resting position is with legs comfortable wide.  Modified Lorin 3/4 in both legs quads and hamstrings above the knee, 4/4 versus fixed contracture in the bilateral ankles and toes with  severe mid-foot / ankle plantar flexion inversion positioning.   Overlying skin in the thighs hamstrings is healthy without lesions or evidence of skin breakdown.    Assessment and recommendations:  1. Teresita has severe spasticity in her lower extremities related to her spinal cord injury and has benefited from chemodenervation with botulinum toxin in the past.  We will repeat these injections today. She for sure has knee extension tone but has articulated the knee flexion tone is the more problematic functionally.  2. Will continue with hamstrings, add b/l gastrocnemius, and continue to forego adductors  3. She should continue with positioning and stretching exercises.  4. Follow-up in 3 months time, will call sooner if any side effects or other concerns arise.       Procedure: Chemodenervation to bilateral lower extremities utilizing onabotulinum toxin type A, Botox. I reviewed at length the risks benefits and potential time course for onset and duration. She agrees and signed the consent form today with her single initial.   present and answered all questions for this process. Total 400 units of Botox lot  C3 expiration 2022 was utilized. NDC #3366-4298-51. Diluted with preservative-free normal saline ratio of 100 units per milliliter. This was divided into 2 syringes. 27-gauge recording EMG needle was utilized in conjunction with EMG guidance to identify the most active motor units while avoiding surrounding structures. Today transferred to bed for injections. All areas were cleansed with ChloraPrep. The following muscles were then injected.    Left le. Hamstring, 100 units divided over 2 sites  2. Gastrocnemius, 100 units divided over 2 sites    Right le. Hamstring, 100 units divided over 2 sites  2. Gastrocnemius, 100 units divided over 2 sites      Besides some involuntary spasms that occur during the needle procedure, she tolerated very well.      Teresita continues to have good  response to the botulinum toxin injections for controlling her symptoms. Contractures and deformities in her feet/ankle are not expected to change with this procedure. She has had multiple medical issues over the past 3 months and is most likely deconditioned. Discussed and ordered PT for conditioning and also to review her HEP. Also needs f/u with PCP for screening for bone density given the risk of osteoporosis in chronic SCI patients. Vitamin D level was 18 in June and she is on supplements.     Kathryn Prado MD  Physical Medicine & Rehabilitation

## 2019-12-19 NOTE — LETTER
12/19/2019       RE: Teresita Bang  [address]  [facility] [place] [facility] [address]     Dear Colleague,    Thank you for referring your patient, Teresita Bang, to the [facility] PHYSICAL MEDICINE AND REHABILITATION at [facility]. Please see a copy of my visit note below.    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 She is accompanied by her brother, and Icelandic  today. She was followed by Dr. Dejesus and was last seen in our clinic 9/19/19.  With more historical injections they had done both hamstrings and adductors that shifted to hamstrings only as she was reporting well controlled adduction. We added gastrocnemius muscles b/l at last visit. She is very pleased with the results last time.  Denies any side effects.  She believes the benefits started to wear off at 12 week time point.  Severity of spasticity is much improved with the botulinum toxin.    Equipment, has new power WC (2018). Arm support is currently being repaired in that chair and she is using old power WC. She transfers to bed via slide board. Does not use braces of any kind.     Medically, she was hospitalized for UTI and septicemia in July. Recovered from that and is now without any urinary issues. She was also diagnosed with pneumonia in late August and treated with a course of Levaquin. She is followed by her PCP regarding HTN and HLD management.       Physical exam:  BP (!) 160/87 (BP Location: Right arm, Patient Position: Sitting, Cuff Size: Adult Regular)   Pulse 82   Temp 98  F (36.7  C)   SpO2 96%      She was alert pleasant no diaphoresis, resting comfortably in power WC. We use a Icelandic  for communication   Involuntary spasms are noted in her bilateral lower extremities. These exacerbate with attempted  passive range of motion, especially in quads b/l. Has both flexion and extension spasticity patterns. No adductor spasms noted and resting position is with legs comfortable wide.  Modified Lorin 3/4 in both legs quads and hamstrings above the knee, 4/4 versus fixed contracture in the bilateral ankles and toes with severe mid-foot / ankle plantar flexion inversion positioning.   Overlying skin in the thighs hamstrings is healthy without lesions or evidence of skin breakdown.    Assessment and recommendations:  1. Teresita has severe spasticity in her lower extremities related to her spinal cord injury and has benefited from chemodenervation with botulinum toxin in the past.  We will repeat these injections today. She for sure has knee extension tone but has articulated the knee flexion tone is the more problematic functionally.  2. Will continue with hamstrings, add b/l gastrocnemius, and continue to forego adductors  3. She should continue with positioning and stretching exercises.  4. Follow-up in 3 months time, will call sooner if any side effects or other concerns arise.       Procedure: Chemodenervation to bilateral lower extremities utilizing onabotulinum toxin type A, Botox. I reviewed at length the risks benefits and potential time course for onset and duration. She agrees and signed the consent form today with her single initial.   present and answered all questions for this process. Total 400 units of Botox lot  C3 expiration 2022 was utilized. NDC #5621-8149-08. Diluted with preservative-free normal saline ratio of 100 units per milliliter. This was divided into 2 syringes. 27-gauge recording EMG needle was utilized in conjunction with EMG guidance to identify the most active motor units while avoiding surrounding structures. Today transferred to bed for injections. All areas were cleansed with ChloraPrep. The following muscles were then injected.    Left le. Hamstring, 100 units  divided over 2 sites  2. Gastrocnemius, 100 units divided over 2 sites    Right le. Hamstring, 100 units divided over 2 sites  2. Gastrocnemius, 100 units divided over 2 sites      Besides some involuntary spasms that occur during the needle procedure, she tolerated very well.    Teresita continues to have good response to the botulinum toxin injections for controlling her symptoms. Contractures and deformities in her feet/ankle are not expected to change with this procedure. She has had multiple medical issues over the past 3 months and is most likely deconditioned. Discussed and ordered PT for conditioning and also to review her HEP. Also needs f/u with PCP for screening for bone density given the risk of osteoporosis in chronic SCI patients. Vitamin D level was 18 in  and she is on supplements.     Kathryn Prado MD  Physical Medicine & Rehabilitation

## 2019-12-19 NOTE — NURSING NOTE
Chief Complaint   Patient presents with     Botox     [facility] RETURN BOTOX - 12 WK F/U BOTOX      Ericka Aiken, EMT

## 2019-12-27 ENCOUNTER — OFFICE VISIT (OUTPATIENT)
Dept: FAMILY MEDICINE | Facility: CLINIC | Age: 34
End: 2019-12-27
Payer: COMMERCIAL

## 2019-12-27 VITALS
HEART RATE: 73 BPM | SYSTOLIC BLOOD PRESSURE: 134 MMHG | RESPIRATION RATE: 16 BRPM | OXYGEN SATURATION: 98 % | DIASTOLIC BLOOD PRESSURE: 88 MMHG

## 2019-12-27 DIAGNOSIS — G82.20 PARAPLEGIA (H): ICD-10-CM

## 2019-12-27 DIAGNOSIS — Z13.6 SCREENING FOR CARDIOVASCULAR CONDITION: ICD-10-CM

## 2019-12-27 DIAGNOSIS — N31.9 NEUROGENIC BLADDER: ICD-10-CM

## 2019-12-27 DIAGNOSIS — E03.9 ACQUIRED HYPOTHYROIDISM: ICD-10-CM

## 2019-12-27 DIAGNOSIS — N39.45 CONTINUOUS LEAKAGE(788.37): ICD-10-CM

## 2019-12-27 DIAGNOSIS — Z13.1 SCREENING FOR DIABETES MELLITUS: ICD-10-CM

## 2019-12-27 DIAGNOSIS — I10 ESSENTIAL HYPERTENSION: ICD-10-CM

## 2019-12-27 DIAGNOSIS — N91.2 AMENORRHEA: ICD-10-CM

## 2019-12-27 DIAGNOSIS — Z86.2 HX OF IRON DEFICIENCY ANEMIA: ICD-10-CM

## 2019-12-27 DIAGNOSIS — Z00.00 ROUTINE GENERAL MEDICAL EXAMINATION AT A HEALTH CARE FACILITY: Primary | ICD-10-CM

## 2019-12-27 LAB
ANION GAP SERPL CALCULATED.3IONS-SCNC: 5 MMOL/L (ref 3–14)
BUN SERPL-MCNC: 8 MG/DL (ref 7–30)
CALCIUM SERPL-MCNC: 9.1 MG/DL (ref 8.5–10.1)
CHLORIDE SERPL-SCNC: 104 MMOL/L (ref 94–109)
CHOLEST SERPL-MCNC: 157 MG/DL
CO2 SERPL-SCNC: 27 MMOL/L (ref 20–32)
CREAT SERPL-MCNC: 0.44 MG/DL (ref 0.52–1.04)
ERYTHROCYTE [DISTWIDTH] IN BLOOD BY AUTOMATED COUNT: 17.1 % (ref 10–15)
ESTRADIOL SERPL-MCNC: 149 PG/ML
FSH SERPL-ACNC: 26.3 IU/L
GFR SERPL CREATININE-BSD FRML MDRD: >90 ML/MIN/{1.73_M2}
GLUCOSE SERPL-MCNC: 74 MG/DL (ref 70–99)
HBA1C MFR BLD: 5.6 % (ref 0–5.6)
HCT VFR BLD AUTO: 40.6 % (ref 35–47)
HDLC SERPL-MCNC: 49 MG/DL
HGB BLD-MCNC: 12.8 G/DL (ref 11.7–15.7)
LDLC SERPL CALC-MCNC: 82 MG/DL
MCH RBC QN AUTO: 26.7 PG (ref 26.5–33)
MCHC RBC AUTO-ENTMCNC: 31.5 G/DL (ref 31.5–36.5)
MCV RBC AUTO: 85 FL (ref 78–100)
NONHDLC SERPL-MCNC: 108 MG/DL
PLATELET # BLD AUTO: 293 10E9/L (ref 150–450)
POTASSIUM SERPL-SCNC: 3.7 MMOL/L (ref 3.4–5.3)
PROLACTIN SERPL-MCNC: 8 UG/L (ref 3–27)
RBC # BLD AUTO: 4.8 10E12/L (ref 3.8–5.2)
SODIUM SERPL-SCNC: 136 MMOL/L (ref 133–144)
T4 FREE SERPL-MCNC: 1.29 NG/DL (ref 0.76–1.46)
TRIGL SERPL-MCNC: 129 MG/DL
TSH SERPL DL<=0.005 MIU/L-ACNC: 4.64 MU/L (ref 0.4–4)
WBC # BLD AUTO: 7.3 10E9/L (ref 4–11)

## 2019-12-27 PROCEDURE — 80061 LIPID PANEL: CPT | Performed by: PHYSICIAN ASSISTANT

## 2019-12-27 PROCEDURE — 99395 PREV VISIT EST AGE 18-39: CPT | Performed by: PHYSICIAN ASSISTANT

## 2019-12-27 PROCEDURE — 85027 COMPLETE CBC AUTOMATED: CPT | Performed by: PHYSICIAN ASSISTANT

## 2019-12-27 PROCEDURE — 83001 ASSAY OF GONADOTROPIN (FSH): CPT | Performed by: PHYSICIAN ASSISTANT

## 2019-12-27 PROCEDURE — 36415 COLL VENOUS BLD VENIPUNCTURE: CPT | Performed by: PHYSICIAN ASSISTANT

## 2019-12-27 PROCEDURE — 80048 BASIC METABOLIC PNL TOTAL CA: CPT | Performed by: PHYSICIAN ASSISTANT

## 2019-12-27 PROCEDURE — 99214 OFFICE O/P EST MOD 30 MIN: CPT | Mod: 25 | Performed by: PHYSICIAN ASSISTANT

## 2019-12-27 PROCEDURE — 84439 ASSAY OF FREE THYROXINE: CPT | Performed by: PHYSICIAN ASSISTANT

## 2019-12-27 PROCEDURE — 82670 ASSAY OF TOTAL ESTRADIOL: CPT | Performed by: PHYSICIAN ASSISTANT

## 2019-12-27 PROCEDURE — 84146 ASSAY OF PROLACTIN: CPT | Performed by: PHYSICIAN ASSISTANT

## 2019-12-27 PROCEDURE — 84443 ASSAY THYROID STIM HORMONE: CPT | Performed by: PHYSICIAN ASSISTANT

## 2019-12-27 PROCEDURE — 83036 HEMOGLOBIN GLYCOSYLATED A1C: CPT | Performed by: PHYSICIAN ASSISTANT

## 2019-12-27 NOTE — PROGRESS NOTES
SUBJECTIVE:   CC: Teresita Bang is an 34 year old woman who presents for preventive health visit.  is present.     Healthy Habits:     Getting at least 3 servings of Calcium per day:  Yes    Bi-annual eye exam:  Yes    Dental care twice a year:  Yes    Sleep apnea or symptoms of sleep apnea:  None    Diet:  Low salt    Frequency of exercise:  2-3 days/week    Duration of exercise:  15-30 minutes    Taking medications regularly:  Yes    Barriers to taking medications:  None    Medication side effects:  Not applicable    PHQ-2 Total Score: 0    Additional concerns today:  Yes    -Needs refills of Chux, incontinence underwear, and disposable gloves.  - Hasn't had menses since Aug 2019. Last cycle was normal, did have cramping but no bleeding in Sept 2019. Hasn't had any symptoms since Sept 2019.     Today's PHQ-2 Score:   PHQ-2 ( 1999 [employer]) 12/27/2019   Q1: Little interest or pleasure in doing things 0   Q2: Feeling down, depressed or hopeless 0   PHQ-2 Score 0   Q1: Little interest or pleasure in doing things Not at all   Q2: Feeling down, depressed or hopeless Not at all   PHQ-2 Score 0     Abuse: Current or Past(Physical, Sexual or Emotional)- No  Do you feel safe in your environment? Yes    Social History     Tobacco Use     Smoking status: Never Smoker     Smokeless tobacco: Never Used   Substance Use Topics     Alcohol use: No     If you drink alcohol do you typically have >3 drinks per day or >7 drinks per week? No    Alcohol Use 12/27/2019   Prescreen: >3 drinks/day or >7 drinks/week? No   Prescreen: >3 drinks/day or >7 drinks/week? -       Reviewed orders with patient.  Reviewed health maintenance and updated orders accordingly - Yes  BP Readings from Last 3 Encounters:   12/27/19 134/88   12/19/19 (!) 160/87   10/09/19 138/79    Wt Readings from Last 3 Encounters:   10/09/19 72.6 kg (160 lb)   09/25/19 72.6 kg (160 lb)   08/28/19 72.6 kg (160 lb)           Mammogram not appropriate for this  patient based on age.    Pertinent mammograms are reviewed under the imaging tab.  History of abnormal Pap smear: NO - age 30-65 PAP every 5 years with negative HPV co-testing recommended  PAP / HPV Latest Ref Rng & Units 5/7/2018 8/15/2013   PAP - NIL NIL   HPV 16 DNA NEG:Negative Negative -   HPV 18 DNA NEG:Negative Negative -   OTHER HR HPV NEG:Negative Negative -     Reviewed and updated as needed this visit by clinical staff  Tobacco  Allergies  Meds  Med Hx  Surg Hx  Fam Hx  Soc Hx        Reviewed and updated as needed this visit by Provider            Review of Systems  CONSTITUTIONAL: NEGATIVE for fever, chills, change in weight  INTEGUMENTARU/SKIN: NEGATIVE for worrisome rashes, moles or lesions  EYES: NEGATIVE for vision changes or irritation  ENT: NEGATIVE for ear, mouth and throat problems  RESP: NEGATIVE for significant cough or SOB  BREAST: NEGATIVE for masses, tenderness or discharge  CV: NEGATIVE for chest pain, palpitations or peripheral edema  GI: NEGATIVE for nausea, abdominal pain, heartburn, or change in bowel habits  : NEGATIVE for unusual urinary or vaginal symptoms. Periods are regular.  MUSCULOSKELETAL: NEGATIVE for significant arthralgias or myalgia  NEURO: NEGATIVE for weakness, dizziness or paresthesias  PSYCHIATRIC: NEGATIVE for changes in mood or affect     OBJECTIVE:   /88   Pulse 73   Resp 16   SpO2 98%   Physical Exam  GENERAL:  WDWN, no acute distress  PSYCH: pleasant, cooperative  EYES: no discharge, no injection  HENT:  Normocephalic. Moist mucus membranes.  NECK:  Supple, symmetric  LUNGS:  Clear to auscultation bilaterally without rhonchi, rales, or wheeze. Chest rise symmetric and no tenderness to palpation.  HEART:  Regular rate & rhythm. No murmur, gallop, or rub.  EXTREMITIES: sitting comfortably in wheel chair, no peripheral edema.  SKIN:  Warm and dry, no rash or suspicious lesions    NEUROLOGIC: alert, sensation grossly intact.    Diagnostic Test  "Results:  none     ASSESSMENT/PLAN:       ICD-10-CM    1. Routine general medical examination at a health care facility Z00.00    2. Amenorrhea N91.2 Estradiol     Follicle stimulating hormone     Prolactin     HCG qualitative, Blood ([id number])   3. Paraplegia (H) G82.20 order for DME     order for DME   4. Continuous leakage(788.37) N39.45 order for DME     Miscellaneous Order for DME - ONLY FOR DME   5. Neurogenic bladder N31.9 order for DME     Miscellaneous Order for DME - ONLY FOR DME   6. Essential hypertension I10 Basic metabolic panel   7. Acquired hypothyroidism E03.9 TSH with free T4 reflex   8. Hx of iron deficiency anemia Z86.2 CBC with platelets   9. Screening for cardiovascular condition Z13.6 Lipid panel reflex to direct LDL Fasting   10. Screening for diabetes mellitus Z13.1 Hemoglobin A1c     - Unclear etiology of secondary amenorrhea. Patient not sexually active, no chance of pregnancy; will check Hcg for completeness. Hx hypothyroid, TSH wnl at last check 8/2019, hasn't missed or skipped doses of levothyroxine, will check TSH again. Will also check hormones as above. F/u pending these results.  - Refills for incontinence supplies as above.    COUNSELING:  Reviewed preventive health counseling, as reflected in patient instructions    Estimated body mass index is 26.63 kg/m  as calculated from the following:    Height as of 10/9/19: 1.651 m (5' 5\").    Weight as of 10/9/19: 72.6 kg (160 lb).    Weight management plan: Discussed healthy diet and exercise guidelines     reports that she has never smoked. She has never used smokeless tobacco.      Counseling Resources:  ATP IV Guidelines  Pooled Cohorts Equation Calculator  Breast Cancer Risk Calculator  FRAX Risk Assessment  ICSI Preventive Guidelines  Dietary Guidelines for Americans, 2010  USDA's MyPlate  ASA Prophylaxis  Lung CA Screening    Gina Eli PA-C  [facility]  "

## 2019-12-28 ENCOUNTER — TELEPHONE (OUTPATIENT)
Dept: FAMILY MEDICINE | Facility: CLINIC | Age: 34
End: 2019-12-28

## 2019-12-28 DIAGNOSIS — E03.9 ACQUIRED HYPOTHYROIDISM: ICD-10-CM

## 2019-12-28 RX ORDER — LEVOTHYROXINE SODIUM 112 UG/1
112 TABLET ORAL DAILY
Qty: 90 TABLET | Refills: 2 | Status: SHIPPED | OUTPATIENT
Start: 2019-12-28 | End: 2021-01-04

## 2019-12-28 NOTE — TELEPHONE ENCOUNTER
Please call patient with her results:    - Your female hormone levels (estrogen, FSH, etc) were all normal  - Your TSH was slightly high and your T4 was normal. Generally with this result we would keep your levothyroxine at the same dose, but due to the abnormalities with your cycle, I'd like to increase the dose slightly and see if this corrects it. I have sent a new dose of 112 mcg daily to your pharmacy. Please return in 6-8 weeks for a recheck.  - You cholesterol was normal. Your diabetes screening was normal. Your kidney function, blood counts, and electrolytes were all normal.

## 2020-01-13 ENCOUNTER — HOSPITAL ENCOUNTER (OUTPATIENT)
Dept: PHYSICAL THERAPY | Facility: CLINIC | Age: 35
Setting detail: THERAPIES SERIES
End: 2020-01-13
Attending: PHYSICAL MEDICINE & REHABILITATION
Payer: COMMERCIAL

## 2020-01-13 DIAGNOSIS — S24.109S INJURY OF THORACIC SPINAL CORD, SEQUELA (H): ICD-10-CM

## 2020-01-13 DIAGNOSIS — G82.22 CHRONIC INCOMPLETE SPASTIC PARAPLEGIA (H): ICD-10-CM

## 2020-01-13 DIAGNOSIS — M62.838 MUSCLE SPASTICITY: ICD-10-CM

## 2020-01-13 PROCEDURE — 97161 PT EVAL LOW COMPLEX 20 MIN: CPT | Mod: GP | Performed by: PHYSICAL THERAPIST

## 2020-01-13 PROCEDURE — 97110 THERAPEUTIC EXERCISES: CPT | Mod: GP | Performed by: PHYSICAL THERAPIST
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
## 2020-01-13 NOTE — PROGRESS NOTES
"   20 1000   Signing Clinician's Name/Credentials   Signing Clinician's Name/Credentials Mary Kemp, DPT, NCS   Function in Sitting Test (FIST). Position 1/2 femur on surface; hips & knees flexed to 90 degrees, use step/stool for positioning & foot support. Randomly nudge each direction only one time.   Anterior Nudge: superior sternum 3   Posterior Nudge: between scapular spines 4   Lateral Nudge: to dominant side at acromion 3   Static Sittin seconds 4   Sitting, Shake \"No\": left and right 4   Sitting, Eyes Closed: 30 seconds 4   Sitting, Lift Foot: dominant side, lift foot 1 inch twice 0   Sitting, Lift Foot Comments no active ROM in LEs    Object from Behind: hands breadth posterior/mid 3   Forward Reach: use dominant arm, complete full ROM 2   Lateral Reach: use dominant arm, clear opposite isch tub 2   Lateral Reach Comments signficant posting off of non-reaching hand    Object From Floor: from between feet 0   Posterior Scooting: move backwards 2 inches 2   Anterior Scooting: move forward 2 inches 2   Lateral Scooting: move to dominant side 2 inches 2   Total Score (A higher score indicates greater ability to move and perform functional tasks in a seated position)   Total Score (out of 56) 35     Function In Sitting Test (FIST): The FIST is a bedside balance test that assesses sensory, motor, proactive and reactive balance factors.     Patient Score: 35/56  Minimal Detectable Change for patients with acute/chronic stroke = 5.63 according to Zapata et al 2010.  Minimal Detectable Change for patients with sitting balance dysfunction = 5.5 according to Zapata et al 2014.  MCID >6.5 points according to Zapata et al 2014.    Assessment (rationale for performing, application to patient s function & care plan): determine progress in sitting balance and core strength for increased indep with B/IADLs.  Minutes billed as physical performance test: 0 - day of eval.  "

## 2020-01-13 NOTE — PROGRESS NOTES
[facility]        OUTPATIENT PHYSICAL THERAPY FUNCTIONAL EVALUATION  PLAN OF TREATMENT FOR OUTPATIENT REHABILITATION  (COMPLETE FOR INITIAL CLAIMS ONLY)  Patient's Last Name, First Name, M.I.  YOB: 1985  Teresita Bang     Provider's Name   [facility]   Medical Record No.  [id number]     Start of Care Date:  01/13/20   Onset Date:  12/19/19   Type:     _X__PT   ____OT  ____SLP Medical Diagnosis:  Chronic incomplete spastic paraplegia; Injury of thoracic spinal cord, sequela; Muscle spasticity.      PT Diagnosis:  impaired functional mobility Visits from SOC:  1                              __________________________________________________________________________________  Plan of Treatment/Functional Goals:  balance training, neuromuscular re-education, ROM, strengthening, stretching     Microcurrent Electrical Stimulation     GOALS  FIST  Pt demo improved core strength and stability for IADLs as evidenced by FIST >41/56.  04/11/20    HEP  Pt demo indep direction HEP for LE ROM, and indep with UB and core strengthening/endurance for ongoing wellness in context of chronic SCI.  04/11/20       Therapy Frequency:  2 times/Week(taper as appropriate)   Predicted Duration of Therapy Intervention:  90 days    Kristel Kemp, PT                                    I CERTIFY THE NEED FOR THESE SERVICES FURNISHED UNDER        THIS PLAN OF TREATMENT AND WHILE UNDER MY CARE     (Physician co-signature of this document indicates review and certification of the therapy plan).                Certification Date From:  01/13/20   Certification Date To:  04/11/20    Referring Provider:  Dr Kathryn Prado    Initial Assessment  See Epic Evaluation- Start of Care Date: 01/13/20

## 2020-02-05 ENCOUNTER — HOSPITAL ENCOUNTER (OUTPATIENT)
Dept: PHYSICAL THERAPY | Facility: CLINIC | Age: 35
Setting detail: THERAPIES SERIES
End: 2020-02-05
Attending: PHYSICAL MEDICINE & REHABILITATION
Payer: COMMERCIAL

## 2020-02-05 PROCEDURE — 97110 THERAPEUTIC EXERCISES: CPT | Mod: GP | Performed by: PHYSICAL THERAPIST

## 2020-02-05 PROCEDURE — 97032 APPL MODALITY 1+ESTIM EA 15: CPT | Mod: GP | Performed by: PHYSICAL THERAPIST

## 2020-02-12 ENCOUNTER — HOSPITAL ENCOUNTER (OUTPATIENT)
Dept: PHYSICAL THERAPY | Facility: CLINIC | Age: 35
Setting detail: THERAPIES SERIES
End: 2020-02-12
Attending: PHYSICAL MEDICINE & REHABILITATION
Payer: COMMERCIAL

## 2020-02-12 PROCEDURE — 97110 THERAPEUTIC EXERCISES: CPT | Mod: GP | Performed by: PHYSICAL THERAPIST

## 2020-02-18 ENCOUNTER — HOSPITAL ENCOUNTER (OUTPATIENT)
Dept: PHYSICAL THERAPY | Facility: CLINIC | Age: 35
Setting detail: THERAPIES SERIES
End: 2020-02-18
Attending: PHYSICAL MEDICINE & REHABILITATION
Payer: COMMERCIAL

## 2020-02-18 PROCEDURE — 97032 APPL MODALITY 1+ESTIM EA 15: CPT | Mod: GP | Performed by: PHYSICAL THERAPIST

## 2020-02-18 NOTE — PROGRESS NOTES
Skilled set up on :  Pt dependent for proper placement of electrodes on B quads for desenstization via sub motor stimulation, safe positioning on w/c within frame and cushion for prevention of skin breakdown, feet secured to foot/arm pedals, w/c secured to  w/ Q-straints.  Passive motion assessed to ensure proper positioning - pedals as smallest arc possible, pt remained in at least 15* knee flexion - both to prevent spastic response.  Determined appropriate FES parameters for each muscle group based off of sensory response of muscle test.    Pt performed 20:00 minutes of active FES ergometry with 6.00 ?C stimulation applied to above muscles at 15.31 rpm (intentionally slow to reduce spastic response) with 0.500 nm resistance.  This PT adjusted e-stim and cycling parameters in real-time to ensure NO palpable muscle contractions throughout session. PT also needed to apply prolonged deep pressure to R patellar tendon initially to break spasticity and allow for ROM. Pt with very inverted feet - applied lace up figure 8 support to R ankle with improved positioning. Instead of crossing straps over top of foot, put straps horizontally which limited the sliding on the hard foot pedals.    Please see [email or web address] for further details on patient's stimulation parameters and ergometry outcomes.      Changes in parameters that were part of this treatment:   -pulse width, frequency  -amplitude  -pedal speed    Functional outcomes from this intervention include:   -reduced spasticity  -improved sensory awareness and proprioception    Patient ID:[id number], PIN: [id number].

## 2020-02-20 ENCOUNTER — HOSPITAL ENCOUNTER (OUTPATIENT)
Dept: PHYSICAL THERAPY | Facility: CLINIC | Age: 35
Setting detail: THERAPIES SERIES
End: 2020-02-20
Attending: PHYSICAL MEDICINE & REHABILITATION
Payer: COMMERCIAL

## 2020-02-20 PROCEDURE — 97032 APPL MODALITY 1+ESTIM EA 15: CPT | Mod: GP | Performed by: PHYSICAL THERAPIST

## 2020-02-20 NOTE — PROGRESS NOTES
Skilled set up on :  Pt dependent for proper placement of electrodes on B quads and HS for desenstization via sub motor stimulation, safe positioning on w/c within frame and cushion for prevention of skin breakdown, feet secured to foot pedals, w/c secured to  w/ Q-straints.  Passive motion assessed to ensure proper positioning - pedals as smallest arc possible, pt remained in at least 15* knee flexion - both to prevent spastic response.  Determined appropriate FES parameters for each muscle group based off of sensory response of muscle test.     Pt performed 26:01 minutes of active FES ergometry with 5.69 ?C stimulation applied to above muscles at 13.43 rpm (intentionally slow to reduce spastic response) with 0.500 nm resistance.  This PT adjusted e-stim and cycling parameters in real-time to ensure NO palpable muscle contractions throughout session.     Pt with very inverted feet - applied lace up figure 8 support to R ankle, Triloc with figure 8 strap to L ankle with improved positioning. Instead of crossing foot straps over top of foot, put straps horizontally which limited the sliding on the hard foot pedals.     Please see [email or web address] for further details on patient's stimulation parameters and ergometry outcomes.       Changes in parameters that were part of this treatment:   -pulse width, frequency  -amplitude  -pedal speed     Functional outcomes from this intervention include:   -reduced spasticity  -improved sensory awareness and proprioception     Patient ID:[id number], PIN: [id number].

## 2020-03-03 ENCOUNTER — HOSPITAL ENCOUNTER (OUTPATIENT)
Dept: PHYSICAL THERAPY | Facility: CLINIC | Age: 35
Setting detail: THERAPIES SERIES
End: 2020-03-03
Attending: PHYSICAL MEDICINE & REHABILITATION
Payer: COMMERCIAL

## 2020-03-03 PROCEDURE — 97112 NEUROMUSCULAR REEDUCATION: CPT | Mod: GP | Performed by: PHYSICAL THERAPIST

## 2020-03-03 PROCEDURE — 97110 THERAPEUTIC EXERCISES: CPT | Mod: GP | Performed by: PHYSICAL THERAPIST

## 2020-03-11 ENCOUNTER — VIRTUAL VISIT (OUTPATIENT)
Dept: INTERPRETER SERVICES | Facility: CLINIC | Age: 35
End: 2020-03-11

## 2020-03-11 ENCOUNTER — HOSPITAL ENCOUNTER (OUTPATIENT)
Dept: PHYSICAL THERAPY | Facility: CLINIC | Age: 35
Setting detail: THERAPIES SERIES
End: 2020-03-11
Attending: PHYSICAL MEDICINE & REHABILITATION
Payer: COMMERCIAL

## 2020-03-11 PROCEDURE — 97110 THERAPEUTIC EXERCISES: CPT | Mod: GP | Performed by: PHYSICAL THERAPIST

## 2020-03-11 NOTE — IP AVS SNAPSHOT
MRN:[id number]                      After Visit Summary   3/11/2020    Teresita Bang    MRN: [id number]           Visit Information        Provider Department      3/11/2020  1:00 PM Kristel Kemp, PT [facility] [place], Physical Therapy - Outpatient        Your next 10 appointments already scheduled    Mar 24, 2020 11:45 AM CDT  Neuro Treatment with Kristel Kemp, PT  [facility] [place], Physical Therapy - Outpatient ([facility]) [address], [address]  [place] [address]  [phone]      Mar 31, 2020  2:45 PM CDT  Neuro Treatment with Juliet Villagomez, PT  [facility] [place], Physical Therapy - Outpatient ([facility]) [address], [address]  [place] [address]  [phone]      Apr 30, 2020  4:20 PM CDT  (Arrive by 4:05 PM)  Return Botox with Kathryn Prado MD  [facility] ([facility]) [address] [place] [address] [id number] [phone]      Jun 25, 2020  2:00 PM CDT  (Arrive by 1:45 PM)  Return Botox with Kathryn Prado MD  [facility] ([facility]) [address] [place] [address] [id number] [phone]         Care EveryWhere ID    This is your Care EveryWhere ID. This could be used by other organizations to access your [place] medical records  [id number]       Equal Access to Services    HERNANDEZ OLEARY AH: Hadii aad ku hadasho Soomaali, waaxda luqadaha, qaybta kaalmada adeegyada, nadiya ahmadi. So [facility] [phone].    ATENCIÓN: Si habla español, tiene a kan disposición servicios gratuitos de asistencia lingüística. Llame al [phone].    We comply with applicable federal and state civil rights laws, including the [place] Human Rights Act. We do not discriminate on the basis of race, color,  creed, Gnosticist, national origin, marital status, age, disability, sex, sexual orientation, or gender identity.

## 2020-03-18 ENCOUNTER — APPOINTMENT (OUTPATIENT)
Dept: INTERPRETER SERVICES | Facility: CLINIC | Age: 35
End: 2020-03-18
Payer: COMMERCIAL

## 2020-06-10 ENCOUNTER — VIRTUAL VISIT (OUTPATIENT)
Dept: FAMILY MEDICINE | Facility: CLINIC | Age: 35
End: 2020-06-10
Payer: COMMERCIAL

## 2020-06-10 DIAGNOSIS — J31.0 CHRONIC RHINITIS: ICD-10-CM

## 2020-06-10 DIAGNOSIS — G82.20 PARAPLEGIA (H): ICD-10-CM

## 2020-06-10 DIAGNOSIS — J45.30 MILD PERSISTENT REACTIVE AIRWAY DISEASE WITHOUT COMPLICATION: Primary | ICD-10-CM

## 2020-06-10 DIAGNOSIS — E03.9 ACQUIRED HYPOTHYROIDISM: ICD-10-CM

## 2020-06-10 DIAGNOSIS — E55.9 VITAMIN D DEFICIENCY: ICD-10-CM

## 2020-06-10 PROCEDURE — 99214 OFFICE O/P EST MOD 30 MIN: CPT | Mod: 95 | Performed by: PHYSICIAN ASSISTANT

## 2020-06-10 RX ORDER — CETIRIZINE HYDROCHLORIDE 10 MG/1
10 TABLET ORAL EVERY EVENING
Qty: 90 TABLET | Refills: 11 | Status: SHIPPED | OUTPATIENT
Start: 2020-06-10 | End: 2021-06-30

## 2020-06-10 RX ORDER — CHOLECALCIFEROL (VITAMIN D3) 50 MCG
2 TABLET ORAL DAILY
Qty: 90 TABLET | Refills: 11 | Status: SHIPPED | OUTPATIENT
Start: 2020-06-10 | End: 2021-09-21

## 2020-06-10 RX ORDER — ALBUTEROL SULFATE 90 UG/1
2 AEROSOL, METERED RESPIRATORY (INHALATION) EVERY 4 HOURS PRN
Qty: 1 INHALER | Refills: 11 | Status: SHIPPED | OUTPATIENT
Start: 2020-06-10 | End: 2021-03-22
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
## 2020-06-10 NOTE — PROGRESS NOTES
"Teresita Bang is a 35 year old female who is being evaluated via a billable video visit.      The patient has been notified of following:     \"This video visit will be conducted via a call between you and your physician/provider. We have found that certain health care needs can be provided without the need for an in-person physical exam.  This service lets us provide the care you need with a video conversation.  If a prescription is necessary we can send it directly to your pharmacy.  If lab work is needed we can place an order for that and you can then stop by our lab to have the test done at a later time.    Video visits are billed at different rates depending on your insurance coverage.  Please reach out to your insurance provider with any questions.    If during the course of the call the physician/provider feels a video visit is not appropriate, you will not be charged for this service.\"    Patient has given verbal consent for Video visit? Yes    How would you like to obtain your AVS? Mail a copy    Patient would like the video invitation sent by: Send to e-mail at: [email or web address]    Will anyone else be joining your video visit? No  {If patient encounters technical issues they should call [phone] :333370}    Subjective     Teresita Bang is a 35 year old female who presents today via video visit for the following health issues:    HPI  Asthma Follow-Up    Was ACT completed today?    Yes    ACT Total Scores 6/10/2020   ACT TOTAL SCORE (Goal Greater than or Equal to 20) 19   In the past 12 months, how many times did you visit the emergency room for your asthma without being admitted to the hospital? 0   In the past 12 months, how many times were you hospitalized overnight because of your asthma? 0     {Provider Documentation  Patient had ACT/CACT less than 19- Link to exacerbation documentation :831046}    How many days per week do you miss taking your asthma controller medication?  0    Please " "describe any recent triggers for your asthma: weather    Have you had any Emergency Room Visits, Urgent Care Visits, or Hospital Admissions since your last office visit?  No      How many servings of fruits and vegetables do you eat daily?  2-3    On average, how many sweetened beverages do you drink each day (Examples: soda, juice, sweet tea, etc.  Do NOT count diet or artificially sweetened beverages)?   0    How many days per week do you exercise enough to make your heart beat faster? 3 or less    How many minutes a day do you exercise enough to make your heart beat faster? 30 - 60    How many days per week do you miss taking your medication? 0    {PEDS Chronic and Acute Problems (Optional):211390}   ***    Video Start Time: {video visit start/end time for provider to select:043797}    {additonal problems for provider to add (Optional):959978}    {HIST REVIEW/ LINKS 2 (Optional):160100}    Reviewed and updated as needed this visit by Provider         Review of Systems   {ROS COMP (Optional):113068}      Objective    There were no vitals taken for this visit.  Estimated body mass index is 26.63 kg/m  as calculated from the following:    Height as of 10/9/19: 1.651 m (5' 5\").    Weight as of 10/9/19: 72.6 kg (160 lb).  Physical Exam     {video visit exam brief selected:015187::\"GENERAL: Healthy, alert and no distress\",\"EYES: Eyes grossly normal to inspection.  No discharge or erythema, or obvious scleral/conjunctival abnormalities.\",\"RESP: No audible wheeze, cough, or visible cyanosis.  No visible retractions or increased work of breathing.  \",\"SKIN: Visible skin clear. No significant rash, abnormal pigmentation or lesions.\",\"NEURO: Cranial nerves grossly intact.  Mentation and speech appropriate for age.\",\"PSYCH: Mentation appears normal, affect normal/bright, judgement and insight intact, normal speech and appearance well-groomed.\"}      {Diagnostic Test Results (Optional):695541::\"Diagnostic Test Results:\",\"Labs " "reviewed in Epic\"}        {PROVIDER CHARTING PREFERENCE:231169}      Video-Visit Details    Type of service:  Video Visit    Video End Time:{video visit start/end time for provider to select:504707}    Originating Location (pt. Location): {video visit patient location:428848::\"Home\"}    Distant Location (provider location):  [facility]     Platform used for Video Visit: {Virtual Visit Platforms:724792::\"Profind\"}    No follow-ups on file.       {signature options:104612}        "

## 2020-06-11 ASSESSMENT — ASTHMA QUESTIONNAIRES: ACT_TOTALSCORE: 19

## 2020-06-25 ENCOUNTER — OFFICE VISIT (OUTPATIENT)
Dept: PHYSICAL MEDICINE AND REHAB | Facility: CLINIC | Age: 35
End: 2020-06-25
Payer: COMMERCIAL

## 2020-06-25 VITALS
BODY MASS INDEX: 23.96 KG/M2 | DIASTOLIC BLOOD PRESSURE: 89 MMHG | TEMPERATURE: 98.2 F | WEIGHT: 144 LBS | HEART RATE: 75 BPM | SYSTOLIC BLOOD PRESSURE: 162 MMHG

## 2020-06-25 DIAGNOSIS — G82.22 CHRONIC INCOMPLETE SPASTIC PARAPLEGIA (H): Primary | ICD-10-CM

## 2020-06-25 DIAGNOSIS — S24.109S INJURY OF THORACIC SPINAL CORD, SEQUELA (H): ICD-10-CM

## 2020-06-25 DIAGNOSIS — M62.838 MUSCLE SPASTICITY: ICD-10-CM

## 2020-06-25 NOTE — PROGRESS NOTES
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 She is accompanied by her sister today. She was followed by Dr. Dejesus and was last seen in our clinic 12/19/19.  She is very pleased with the results last time.  Denies any side effects.  She believes the benefits started to wear off at 10 week time point.  Severity of spasticity is much improved with the botulinum toxin.    Equipment, has new power WC (2018). Arm support was repaired. She transfers to bed via slide board. Does not use braces of any kind.     Medically, she has been stable since December. She is followed by her PCP regarding HTN and HLD management.       Physical exam:  BP (!) 162/89 (BP Location: Right arm, Patient Position: Sitting, Cuff Size: Adult Regular)   Pulse 75   Temp 98.2  F (36.8  C) (Oral)   Wt 65.3 kg (144 lb)   BMI 23.96 kg/m       She was alert pleasant, sitting comfortably in power WC.       We use a [employer]  for communication   Involuntary spasms are noted in her bilateral lower extremities. These exacerbate with attempted passive range of motion, especially in quads b/l. Has both flexion and extension spasticity patterns. No adductor spasms noted and resting position is with legs comfortable wide.  Modified Lorin 3/4 in both legs quads and hamstrings above the knee, 4/4 versus fixed contracture in the bilateral ankles and toes with severe mid-foot / ankle plantar flexion inversion positioning.   Overlying skin in the thighs hamstrings is healthy without lesions or evidence of skin breakdown.    Assessment and recommendations:  1. Teresita has severe spasticity in her lower extremities related to her spinal cord injury and has benefited from chemodenervation with botulinum toxin in the past.  We will repeat these injections today. She for sure has knee  extension tone but has articulated the knee flexion tone is the more problematic functionally.  2. Will continue with hamstrings, add b/l gastrocnemius, and continue to forego adductors  3. She should continue with positioning and stretching exercises.  4. Follow-up in 3 months time, will call sooner if any side effects or other concerns arise.       Procedure: Chemodenervation to bilateral lower extremities utilizing onabotulinum toxin type A, Botox. I reviewed at length the risks benefits and potential time course for onset and duration. She agrees and signed the consent form today with her single initial.   present and answered all questions for this process. Total 400 units of Botox Lot # /C3 with Expiration Date: 2023 was utilized. NDC #6163-1175-89. Diluted with preservative-free normal saline ratio of 100 units per milliliter. This was divided into 2 syringes. 27-gauge recording EMG needle was utilized in conjunction with EMG guidance to identify the most active motor units while avoiding surrounding structures. Today transferred to bed for injections. All areas were cleansed with ChloraPrep. The following muscles were then injected.    Left le. Hamstring, 100 units divided over 2 sites  2. Gastrocnemius, 50 units divided over 2 sites  3. Adductors 50 units     Right le. Hamstring, 100 units divided over 2 sites  2. Gastrocnemius, 100 units divided over 2 sites  3. Adductors 50 units       Besides some involuntary spasms that occur during the needle procedure, she tolerated very well.      Teresita continues to have good response to the botulinum toxin injections for controlling her symptoms. Contractures and deformities in her feet/ankle are not expected to change with this procedure. She has had multiple medical issues over the past 3 months and is most likely deconditioned. Discussed and ordered PT for conditioning and also to review her HEP. Also needs f/u with PCP for screening  for bone density given the risk of osteoporosis in chronic SCI patients. Vitamin D level was 18 in June and she is on supplements.     Kathryn Prado MD  Physical Medicine & Rehabilitation

## 2020-06-25 NOTE — LETTER
6/25/2020       RE: Teresita Bang  [address]  [facility] [address]     Dear Colleague,    Thank you for referring your patient, Teresita Bang, to the [facility] PHYSICAL MEDICINE AND REHABILITATION at [facility]. Please see a copy of my visit note below.    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 She is accompanied by her sister today. She was followed by Dr. Dejesus and was last seen in our clinic 12/19/19.  She is very pleased with the results last time.  Denies any side effects.  She believes the benefits started to wear off at 10 week time point.  Severity of spasticity is much improved with the botulinum toxin.    Equipment, has new power WC (2018). Arm support was repaired. She transfers to bed via slide board. Does not use braces of any kind.     Medically, she has been stable since December. She is followed by her PCP regarding HTN and HLD management.       Physical exam:  BP (!) 162/89 (BP Location: Right arm, Patient Position: Sitting, Cuff Size: Adult Regular)   Pulse 75   Temp 98.2  F (36.8  C) (Oral)   Wt 65.3 kg (144 lb)   BMI 23.96 kg/m       She was alert pleasant, sitting comfortably in power WC.       We use a [employer]  for communication   Involuntary spasms are noted in her bilateral lower extremities. These exacerbate with attempted passive range of motion, especially in quads b/l. Has both flexion and extension spasticity patterns. No adductor spasms noted and resting position is with legs comfortable wide.  Modified Lorin 3/4 in both legs quads and hamstrings above the knee, 4/4 versus fixed contracture in the bilateral ankles and toes with severe mid-foot / ankle plantar flexion inversion positioning.   Overlying skin in the thighs hamstrings is  healthy without lesions or evidence of skin breakdown.    Assessment and recommendations:  1. Teresita has severe spasticity in her lower extremities related to her spinal cord injury and has benefited from chemodenervation with botulinum toxin in the past.  We will repeat these injections today. She for sure has knee extension tone but has articulated the knee flexion tone is the more problematic functionally.  2. Will continue with hamstrings, add b/l gastrocnemius, and continue to forego adductors  3. She should continue with positioning and stretching exercises.  4. Follow-up in 3 months time, will call sooner if any side effects or other concerns arise.       Procedure: Chemodenervation to bilateral lower extremities utilizing onabotulinum toxin type A, Botox. I reviewed at length the risks benefits and potential time course for onset and duration. She agrees and signed the consent form today with her single initial.   present and answered all questions for this process. Total 400 units of Botox Lot # /C3 with Expiration Date: 2023 was utilized. NDC #6125-1477-70. Diluted with preservative-free normal saline ratio of 100 units per milliliter. This was divided into 2 syringes. 27-gauge recording EMG needle was utilized in conjunction with EMG guidance to identify the most active motor units while avoiding surrounding structures. Today transferred to bed for injections. All areas were cleansed with ChloraPrep. The following muscles were then injected.    Left le. Hamstring, 100 units divided over 2 sites  2. Gastrocnemius, 50 units divided over 2 sites  3. Adductors 50 units     Right le. Hamstring, 100 units divided over 2 sites  2. Gastrocnemius, 100 units divided over 2 sites  3. Adductors 50 units       Besides some involuntary spasms that occur during the needle procedure, she tolerated very well.      Teresita continues to have good response to the botulinum toxin injections for  controlling her symptoms. Contractures and deformities in her feet/ankle are not expected to change with this procedure. She has had multiple medical issues over the past 3 months and is most likely deconditioned. Discussed and ordered PT for conditioning and also to review her HEP. Also needs f/u with PCP for screening for bone density given the risk of osteoporosis in chronic SCI patients. Vitamin D level was 18 in June and she is on supplements.     Kathryn Prado MD  Physical Medicine & Rehabilitation

## 2020-07-14 ENCOUNTER — VIRTUAL VISIT (OUTPATIENT)
Dept: FAMILY MEDICINE | Facility: CLINIC | Age: 35
End: 2020-07-14
Payer: COMMERCIAL

## 2020-07-14 DIAGNOSIS — I10 ESSENTIAL HYPERTENSION: ICD-10-CM

## 2020-07-14 PROCEDURE — 99214 OFFICE O/P EST MOD 30 MIN: CPT | Mod: 95 | Performed by: FAMILY MEDICINE

## 2020-07-14 RX ORDER — AMLODIPINE BESYLATE 5 MG/1
5 TABLET ORAL DAILY
Qty: 90 TABLET | Refills: 1 | Status: SHIPPED | OUTPATIENT
Start: 2020-07-14 | End: 2020-11-23

## 2020-07-14 NOTE — PROGRESS NOTES
"Teresita Bang is a 35 year old female who is being evaluated via a billable telephone visit.      The patient has been notified of following:     \"This telephone visit will be conducted via a call between you and your physician/provider. We have found that certain health care needs can be provided without the need for a physical exam.  This service lets us provide the care you need with a short phone conversation.  If a prescription is necessary we can send it directly to your pharmacy.  If lab work is needed we can place an order for that and you can then stop by our lab to have the test done at a later time.    Telephone visits are billed at different rates depending on your insurance coverage. During this emergency period, for some insurers they may be billed the same as an in-person visit.  Please reach out to your insurance provider with any questions.    If during the course of the call the physician/provider feels a telephone visit is not appropriate, you will not be charged for this service.\"    Patient has given verbal consent for Telephone visit?  Yes    What phone number would you like to be contacted at? [phone]    How would you like to obtain your AVS? Mail a copy    Subjective     Teresita Bang is a 35 year old female who presents via phone visit today for the following health issues:    HPI  Hypertension Follow-up      Do you check your blood pressure regularly outside of the clinic? Yes     Are you following a low salt diet? Yes    Are your blood pressures ever more than 140 on the top number (systolic) OR more   than 90 on the bottom number (diastolic), for example 140/90? Yes  135/120 this morning     She hasn't taken Norvasc 5 mg for the last 3 days.       Reviewed and updated as needed this visit by Provider         Review of Systems   Constitutional, HEENT, cardiovascular, pulmonary, gi and gu systems are negative, except as otherwise noted.       Objective   Reported vitals:  There " were no vitals taken for this visit.   healthy, alert and no distress  PSYCH: Alert and oriented times 3; coherent speech, normal   rate and volume, able to articulate logical thoughts, able   to abstract reason, no tangential thoughts, no hallucinations   or delusions  Her affect is normal  RESP: No cough, no audible wheezing, able to talk in full sentences  Remainder of exam unable to be completed due to telephone visits    Diagnostic Test Results:  Labs reviewed in Epic        Assessment/Plan:    1. Essential hypertension  - not controlled  - advised continued low salt   - pt will be scheduled for MA visit in 2 weeks, if B/P is still elevated then she would need medication adjusted  - amLODIPine (NORVASC) 5 MG tablet; Take 1 tablet (5 mg) by mouth daily  Dispense: 90 tablet; Refill: 1      Phone call duration:  4 minutes    Jag Moran MD

## 2020-07-28 ENCOUNTER — OFFICE VISIT (OUTPATIENT)
Dept: NURSING | Facility: CLINIC | Age: 35
End: 2020-07-28
Payer: COMMERCIAL

## 2020-07-28 VITALS — HEART RATE: 81 BPM | SYSTOLIC BLOOD PRESSURE: 147 MMHG | DIASTOLIC BLOOD PRESSURE: 81 MMHG

## 2020-07-28 DIAGNOSIS — I10 ESSENTIAL HYPERTENSION: Primary | ICD-10-CM

## 2020-07-28 PROCEDURE — 99207 ZZC NO BILLABLE SERVICE THIS VISIT: CPT

## 2020-07-28 NOTE — PATIENT INSTRUCTIONS
Increase Norvasc to 7.5 mg daily   Check home blood pressures for one week. If average blood pressures are greater than 140/90 then increase Norvasc to 10 mg daily.   Schedule office or telephone visit in one month.

## 2020-09-22 NOTE — PROGRESS NOTES
"  Outpatient Physical Therapy Discharge Note     Patient: Teresita Bang  : 1985    Beginning/End Dates of Reporting Period:  2020 to 3/11/2020    Referring Provider: Dr Kathryn Prado    Therapy Diagnosis: impaired functional mobility     Client Self Report: Pt arrived for appt despite sister calling eariler in the day to cancel appt due to lack of transportation. Was able to get phone  - pt signed for no  today, brother to provide support.    Objective Measurements:  Objective Measure: nustep  Details: WL 1  seat 9 arms 9.  Time: 16min  distance:  0.38 miles; belt around thighs.   max assist to set up with feet strapped in and towels under her feet, black fig 8 strap for L foot security, belt aruond knees for more     Goals:  Goal Identifier FIST   Goal Description Pt demo improved core strength and stability for IADLs as evidenced by FIST >41/56.   Target Date 20   Date Met   NOT MET   Progress: Was not retested     Goal Identifier HEP   Goal Description Pt demo indep direction HEP for LE ROM, and indep with UB and core strengthening/endurance for ongoing wellness in context of chronic SCI.   Target Date 20   Date Met   NOT MET   Progress: Had some stretching she was trying to do/get assistance with from her staff. Had not finalized cardiovascular program before [place] [address] clinic closure.       Progress Toward Goals:   Progress limited due to COVID-19 clinic restrictions. Pt chose to not return to clinic until \"after this is all over.\"     Plan:  Discharge from therapy.    Discharge:    Reason for Discharge: Patient chooses to discontinue therapy.    Discharge Plan: Patient to continue home program.  Other services: return to PT when feeling comfortable with new physician order.  "

## 2020-10-01 ENCOUNTER — TELEPHONE (OUTPATIENT)
Dept: PHYSICAL MEDICINE AND REHAB | Facility: CLINIC | Age: 35
End: 2020-10-01

## 2020-10-01 NOTE — TELEPHONE ENCOUNTER
[facility] Call Center    Phone Message    May a detailed message be left on voicemail: yes     Reason for Call: Other: Pt calling to reschedule 10/01 apt (Botox). Please call her back to reschedule.      Action Taken: Message routed to:  [facility]): jose pmr     Travel Screening: Not Applicable

## 2020-10-02 NOTE — TELEPHONE ENCOUNTER
Returned patient call. Spoke with patient's . He will speak with the patient and call the clinic back to reschedule her appt.

## 2020-10-07 NOTE — TELEPHONE ENCOUNTER
[facility] Call Center    Phone Message    May a detailed message be left on voicemail: yes     Reason for Call: Other: Patient sister calling to reschedule botox appointment that was cancelled for 10/1.     Please advise and call Janie or patient back to schedule new appointment.     Action Taken: Other: [facility] PM[facility]    Travel Screening: Not Applicable

## 2020-10-22 ENCOUNTER — OFFICE VISIT (OUTPATIENT)
Dept: PHYSICAL MEDICINE AND REHAB | Facility: CLINIC | Age: 35
End: 2020-10-22
Payer: COMMERCIAL

## 2020-10-22 VITALS
DIASTOLIC BLOOD PRESSURE: 90 MMHG | RESPIRATION RATE: 18 BRPM | OXYGEN SATURATION: 99 % | SYSTOLIC BLOOD PRESSURE: 175 MMHG | HEART RATE: 79 BPM

## 2020-10-22 DIAGNOSIS — S24.109S INJURY OF THORACIC SPINAL CORD, SEQUELA (H): ICD-10-CM

## 2020-10-22 DIAGNOSIS — M62.838 MUSCLE SPASTICITY: ICD-10-CM

## 2020-10-22 DIAGNOSIS — G82.22 CHRONIC INCOMPLETE SPASTIC PARAPLEGIA (H): Primary | ICD-10-CM

## 2020-10-22 PROCEDURE — 64643 CHEMODENERV 1 EXTREM 1-4 EA: CPT | Performed by: PHYSICAL MEDICINE & REHABILITATION

## 2020-10-22 PROCEDURE — 64642 CHEMODENERV 1 EXTREMITY 1-4: CPT | Performed by: PHYSICAL MEDICINE & REHABILITATION

## 2020-10-22 PROCEDURE — 95874 GUIDE NERV DESTR NEEDLE EMG: CPT | Performed by: PHYSICAL MEDICINE & REHABILITATION

## 2020-10-22 NOTE — PROGRESS NOTES
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 She is accompanied by her sister today. She was followed by Dr. Dejesus and was last seen in our clinic 6/25/20.  She is very pleased with the results last time.  Denies any side effects. She believes the benefits started to wear off at 10 week time point.  Severity of spasticity is much improved with the botulinum toxin.    Equipment, has new power Crossborders (2018). Arm support was repaired. She transfers to bed via slide board. Does not use braces of any kind.     Medically, she has been stable with no ER visit or hospitalization. She is followed by her PCP regarding HTN and HLD management.       Physical exam:  BP (!) 175/90   Pulse 79   Resp 18   SpO2 99%      She was alert pleasant, sitting comfortably in power WC.     Involuntary spasms are noted in her bilateral lower extremities. These exacerbate with attempted passive range of motion, especially in quads b/l. Has both flexion and extension spasticity patterns. No adductor spasms noted and resting position is with legs comfortable wide.  Modified Lorin 3/4 in both legs quads and hamstrings above the knee, 4/4 versus fixed contracture in the bilateral ankles and toes with severe mid-foot / ankle plantar flexion inversion positioning.   Overlying skin in the thighs hamstrings is healthy without lesions or evidence of skin breakdown.    Assessment and recommendations:  Teresita is doing well. She responds well to Botox injections for the management of spasticity and associated symptoms. Contractures and deformities in her feet/ankle are not expected to change with this procedure. No change in the total dose or sites of injections today. She will discuss evaluation of vitamin D level and osteoporosis with her PCP. Can consider referral to Dr. Pascual  [facility].     Teresita has severe spasticity in her lower extremities related to her spinal cord injury and has benefited from chemodenervation with botulinum toxin in the past.  We will repeat these injections today. She for sure has knee extension tone but has articulated the knee flexion tone is the more problematic functionally.  Will continue with hamstrings, gastrocnemius, and adductors  She should continue with positioning and stretching exercises.  Follow-up in 3 months time, will call sooner if any side effects or other concerns arise.       Procedure: Chemodenervation to bilateral lower extremities utilizing onabotulinum toxin type A, Botox. I reviewed at length the risks benefits and potential time course for onset and duration. She agrees and signed the consent form today with her single initial.   present and answered all questions for this process. Total 400 units of Botox Lot # /C3 with Expiration Date: 2023 was utilized. NDC #5555-3104-13. Diluted with preservative-free normal saline ratio of 100 units per milliliter. This was divided into 2 syringes. 27-gauge recording EMG needle was utilized in conjunction with EMG guidance to identify the most active motor units while avoiding surrounding structures. Today transferred to bed for injections. All areas were cleansed with ChloraPrep. The following muscles were then injected.    Left le. Hamstring, 100 units divided over 2 sites  2. Gastrocnemius, 50 units divided over 2 sites  3. Adductors 50 units     Right le. Hamstring, 100 units divided over 2 sites  2. Gastrocnemius, 50 units divided over 2 sites  3. Adductors 50 units       Kathryn Prado MD  Physical Medicine & Rehabilitation

## 2020-10-22 NOTE — LETTER
10/22/2020       RE: Teresita Bang  [address]  [place] [address]     Dear Colleague,    Thank you for referring your patient, Teresita Bang, to the [facility] PHYSICAL MEDICINE [facility]HABILITATION CLINIC [place] at [facility]. Please see a copy of my visit note below.    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 She is accompanied by her sister today. She was followed by Dr. Dejesus and was last seen in our clinic 6/25/20.  She is very pleased with the results last time.  Denies any side effects. She believes the benefits started to wear off at 10 week time point.  Severity of spasticity is much improved with the botulinum toxin.    Equipment, has new power WC (2018). Arm support was repaired. She transfers to bed via slide board. Does not use braces of any kind.     Medically, she has been stable with no ER visit or hospitalization. She is followed by her PCP regarding HTN and HLD management.       Physical exam:  BP (!) 175/90   Pulse 79   Resp 18   SpO2 99%      She was alert pleasant, sitting comfortably in power WC.     Involuntary spasms are noted in her bilateral lower extremities. These exacerbate with attempted passive range of motion, especially in quads b/l. Has both flexion and extension spasticity patterns. No adductor spasms noted and resting position is with legs comfortable wide.  Modified Lorin 3/4 in both legs quads and hamstrings above the knee, 4/4 versus fixed contracture in the bilateral ankles and toes with severe mid-foot / ankle plantar flexion inversion positioning.   Overlying skin in the thighs hamstrings is healthy without lesions or evidence of skin breakdown.    Assessment and recommendations:  Teresita is doing well. She responds well  to Botox injections for the management of spasticity and associated symptoms. Contractures and deformities in her feet/ankle are not expected to change with this procedure. No change in the total dose or sites of injections today. She will discuss evaluation of vitamin D level and osteoporosis with her PCP. Can consider referral to Dr. Pascual bone health clinic.     Teresita has severe spasticity in her lower extremities related to her spinal cord injury and has benefited from chemodenervation with botulinum toxin in the past.  We will repeat these injections today. She for sure has knee extension tone but has articulated the knee flexion tone is the more problematic functionally.  Will continue with hamstrings, gastrocnemius, and adductors  She should continue with positioning and stretching exercises.  Follow-up in 3 months time, will call sooner if any side effects or other concerns arise.       Procedure: Chemodenervation to bilateral lower extremities utilizing onabotulinum toxin type A, Botox. I reviewed at length the risks benefits and potential time course for onset and duration. She agrees and signed the consent form today with her single initial.   present and answered all questions for this process. Total 400 units of Botox Lot # /C3 with Expiration Date: 2023 was utilized. NDC #5458-1777-26. Diluted with preservative-free normal saline ratio of 100 units per milliliter. This was divided into 2 syringes. 27-gauge recording EMG needle was utilized in conjunction with EMG guidance to identify the most active motor units while avoiding surrounding structures. Today transferred to bed for injections. All areas were cleansed with ChloraPrep. The following muscles were then injected.    Left le. Hamstring, 100 units divided over 2 sites  2. Gastrocnemius, 50 units divided over 2 sites  3. Adductors 50 units     Right le. Hamstring, 100 units divided over 2 sites  2. Gastrocnemius, 50  units divided over 2 sites  3. Adductors 50 units       Kathryn Prado MD  Physical Medicine & Rehabilitation

## 2020-11-23 ENCOUNTER — VIRTUAL VISIT (OUTPATIENT)
Dept: FAMILY MEDICINE | Facility: CLINIC | Age: 35
End: 2020-11-23
Payer: COMMERCIAL

## 2020-11-23 DIAGNOSIS — Z23 NEEDS FLU SHOT: ICD-10-CM

## 2020-11-23 DIAGNOSIS — E03.9 ACQUIRED HYPOTHYROIDISM: ICD-10-CM

## 2020-11-23 DIAGNOSIS — I10 ESSENTIAL HYPERTENSION: Primary | ICD-10-CM

## 2020-11-23 DIAGNOSIS — K59.09 OTHER CONSTIPATION: ICD-10-CM

## 2020-11-23 PROCEDURE — 99442 PR PHYSICIAN TELEPHONE EVALUATION 11-20 MIN: CPT | Performed by: FAMILY MEDICINE

## 2020-11-23 RX ORDER — METOPROLOL SUCCINATE 50 MG/1
50 TABLET, EXTENDED RELEASE ORAL AT BEDTIME
Qty: 60 TABLET | Refills: 1 | Status: SHIPPED | OUTPATIENT
Start: 2020-11-23 | End: 2021-01-27

## 2020-11-23 RX ORDER — BISACODYL 10 MG
10 SUPPOSITORY, RECTAL RECTAL DAILY PRN
Qty: 30 SUPPOSITORY | Refills: 11 | Status: SHIPPED | OUTPATIENT
Start: 2020-11-23 | End: 2022-02-15

## 2020-11-23 NOTE — PROGRESS NOTES
"Teresita Bang is a 35 year old female who is being evaluated via a billable telephone visit.      The patient has been notified of following:     \"This telephone visit will be conducted via a call between you and your physician/provider. We have found that certain health care needs can be provided without the need for a physical exam.  This service lets us provide the care you need with a short phone conversation.  If a prescription is necessary we can send it directly to your pharmacy.  If lab work is needed we can place an order for that and you can then stop by our lab to have the test done at a later time.    Telephone visits are billed at different rates depending on your insurance coverage. During this emergency period, for some insurers they may be billed the same as an in-person visit.  Please reach out to your insurance provider with any questions.    If during the course of the call the physician/provider feels a telephone visit is not appropriate, you will not be charged for this service.\"    Patient has given verbal consent for Telephone visit?  Yes    What phone number would you like to be contacted at? [phone] option 1, option 2 (Palestinian)    How would you like to obtain your AVS? Mail a copy    Subjective     Teresita Bang is a 35 year old female who presents via phone visit today for the following health issues:    HPI     Hypertension Follow-up      Do you check your blood pressure regularly outside of the clinic? Yes     Are you following a low salt diet? No    Are your blood pressures ever more than 140 on the top number (systolic) OR more   than 90 on the bottom number (diastolic), for example 140/90? Yes sometimes but then she takes her BP medication       How many servings of fruits and vegetables do you eat daily?  0-1    On average, how many sweetened beverages do you drink each day (Examples: soda, juice, sweet tea, etc.  Do NOT count diet or artificially sweetened beverages)?   1- " orange juice a day     How many days per week do you exercise enough to make your heart beat faster? 3 or less    How many minutes a day do you exercise enough to make your heart beat faster? 20 - 29    How many days per week do you miss taking your medication? 0    Teresita Bang is a 35 year old trung patient new to me in clinic today. She has a history of hypertension and hypothyroidism.  Phone visit with .    Has a blood pressure cuff at home. Checked at home today: 159/85-90.    Has amlodipine 5 mg at home.  Doesn't take because it gives her a headache.  Would like to try a different one.  Has been on metoprolol in past - brought blood pressure down and made her sleepy.  Also has asthma so they stopped it.     Flu shot: will do       Review of Systems   Constitutional, HEENT, cardiovascular, pulmonary, gi and gu systems are negative, except as otherwise noted.       Objective          Vitals:  No vitals were obtained today due to virtual visit.    healthy, alert and no distress  PSYCH: Alert and oriented times 3; coherent speech, normal   rate and volume, able to articulate logical thoughts, able   to abstract reason, no tangential thoughts, no hallucinations   or delusions  Her affect is normal  RESP: No cough, no audible wheezing, able to talk in full sentences  Remainder of exam unable to be completed due to telephone visits          Assessment/Plan:    Assessment & Plan     Teresita was seen today for hypertension.    Diagnoses and all orders for this visit:    Essential hypertension  Comments:    Amlodipine gives her headaches will d/c    Could have pregnancy in future so avoid ACE/ARB    Plan to restart metoprolol nightly (didn't worsen asthma in past)    Recheck in person 2- 4wks.    If not controlled, consider adding hctz  Orders:  -     **Basic metabolic panel FUTURE anytime; Future  -     metoprolol succinate ER (TOPROL-XL) 50 MG 24 hr tablet; Take 1 tablet (50 mg) by mouth At  Bedtime    Acquired hypothyroidism  Comments:    Due for TSH check.    Will do in person in 2-4 weeks  Orders:  -     **TSH with free T4 reflex FUTURE anytime; Future    Other constipation  Comments:    Refilled dulcolax supp for prn use  Orders:  -     bisacodyl (DULCOLAX) 10 MG suppository; Place 1 suppository (10 mg) rectally daily as needed for constipation    Needs flu shot    Comments:  Will do when comes to see me for clinic visit in 2-4 weeks                Return in about 2 weeks (around 12/7/2020) for Follow up, with me, in person, hypertension and flu shot.    Selene Shankar MD  [facility]    Phone call duration:  18 minutes

## 2020-12-09 ENCOUNTER — TELEPHONE (OUTPATIENT)
Dept: FAMILY MEDICINE | Facility: CLINIC | Age: 35
End: 2020-12-09

## 2020-12-09 NOTE — TELEPHONE ENCOUNTER
Needs of attention regarding:  -Asthma    Health Maintenance Topics with due status: Overdue       Topic Date Due    Pneumococcal Vaccine: Pediatrics (0 to 5 Years) and At-Risk Patients (6 to 64 Years) 01/01/1989    HIV SCREENING 01/01/1998    VITAMIN D 06/26/2020    INFLUENZA VACCINE 09/01/2020    ASTHMA ACTION PLAN 09/25/2020     Health Maintenance Topics with due status: Due Soon       Topic Date Due    ASTHMA CONTROL TEST 12/10/2020    PREVENTIVE CARE VISIT 12/27/2020    BMP 12/27/2020    TSH W/FREE T4 REFLEX 12/27/2020       Communication:  See Letter

## 2020-12-09 NOTE — LETTER
December 9, 2020      Teresita Bang  [address]  [place] [address]        Dear Teresita,    I care about your health and have reviewed your health plan. I have reviewed your medical conditions, medication list, and lab results and am making recommendations based on this review, to better manage your health.    You are in particular need of attention regarding:  -Asthma    I am recommending that you:   -Complete and return the attached ASTHMA CONTROL TEST.  If your total score is 19 or less or you have been to the ER or urgent care for your asthma, then please schedule an asthma followup appointment.    Here is a list of Health Maintenance topics that are due now or due soon:  Health Maintenance Due   Topic Date Due     Pneumococcal Vaccine (1 of 1 - PPSV23) 01/01/1989     HIV Screening  01/01/1998     Vitamin D Level  06/26/2020     Flu Vaccine (1) 09/01/2020     Asthma Action Plan - yearly  09/25/2020     Asthma Control Test  12/10/2020     Preventive Care Visit  12/27/2020     Basic Metabolic Panel  12/27/2020     Thyroid Function Lab  12/27/2020       Please call us at [phone] (or use [place]) to address the above recommendations.     Thank you for trusting [facility] and we appreciate the opportunity to serve you.  We look forward to supporting your healthcare needs in the future.    Healthy Regards,    Gina Eli PA-C

## 2021-01-21 ENCOUNTER — OFFICE VISIT (OUTPATIENT)
Dept: PHYSICAL MEDICINE AND REHAB | Facility: CLINIC | Age: 36
End: 2021-01-21
Payer: COMMERCIAL

## 2021-01-21 VITALS
DIASTOLIC BLOOD PRESSURE: 92 MMHG | OXYGEN SATURATION: 97 % | TEMPERATURE: 97.8 F | HEART RATE: 85 BPM | SYSTOLIC BLOOD PRESSURE: 184 MMHG

## 2021-01-21 DIAGNOSIS — S24.109S INJURY OF THORACIC SPINAL CORD, SEQUELA (H): ICD-10-CM

## 2021-01-21 DIAGNOSIS — G82.22 CHRONIC INCOMPLETE SPASTIC PARAPLEGIA (H): Primary | ICD-10-CM

## 2021-01-21 DIAGNOSIS — M62.838 MUSCLE SPASTICITY: ICD-10-CM

## 2021-01-21 PROCEDURE — 64643 CHEMODENERV 1 EXTREM 1-4 EA: CPT | Performed by: PHYSICAL MEDICINE & REHABILITATION

## 2021-01-21 PROCEDURE — 95874 GUIDE NERV DESTR NEEDLE EMG: CPT | Performed by: PHYSICAL MEDICINE & REHABILITATION

## 2021-01-21 PROCEDURE — 64642 CHEMODENERV 1 EXTREMITY 1-4: CPT | Performed by: PHYSICAL MEDICINE & REHABILITATION

## 2021-01-26 NOTE — PROGRESS NOTES
Assessment & Plan     Encounter to establish care with new doctor    Terri 36 year old, new to me in clinic today although I did see her for virtual visit in November.    Used to see Gina Eli - I gave patient info of where she can find Gina if she'd like to follow her.    She'd like eye doc appointment; referred.    In meantime, will continue to establish care with patient.    Close follow up in 1 month, review problem list, med list again at that time    Essential hypertension    Doesn't like metoprolol, doesn't seem to work well.    Switch back to labetalol 100 mg BID.    Discussed risks,benefits, side effects and emphasized that needs to stop metoprolol before starting labetalol.    Will watch for hypotension although that hasn't been issue in past.    Wants med that can take with pregnancy  - labetalol (NORMODYNE) 100 MG tablet; Take 1 tablet (100 mg) by mouth 2 times daily    Paraplegia (H)    Referred physical therapy    [facility] is her preferred    Also needs CHUX, and diapers.    Ordered all of above.    Additionally, her PM&R doc recommended that Teresita see Dr. Sweetie Pascual for bone health discussion and management given her chronic wheelchair status and paraplegia.  Referred today.  - Incontinence Supplies Order for DME - ONLY FOR DME  - Miscellaneous Order for DME - ONLY FOR DME  - NUPUR PT, HAND, AND CHIROPRACTIC REFERRAL; Future  - PHYSIATRY REFERRAL    Vitamin D deficiency  - Vitamin D Deficiency    Review of external notes as documented above   Review of prior external note(s) from - old PCP, PM&R doc,       Diagnosis or treatment significantly limited by social determinants of health - Paraplegia and english as a second language    34 minutes spent on the date of the encounter doing chart review, history and exam, documentation and further activities as noted above      Return in about 4 weeks (around 2/24/2021) for Follow up, hypertension.    Selene Shankar MD  M  [facility] TAWANDA Lo is a 36 year old who presents to clinic today for the following health issues     HPI        Hypertension Follow-up      Do you check your blood pressure regularly outside of the clinic? Yes     Are you following a low salt diet? Yes    Are your blood pressures ever more than 140 on the top number (systolic) OR more   than 90 on the bottom number (diastolic), for example 140/90? Yes 165 systolic       How many servings of fruits and vegetables do you eat daily?  2-3    On average, how many sweetened beverages do you drink each day (Examples: soda, juice, sweet tea, etc.  Do NOT count diet or artificially sweetened beverages)?   0    How many days per week do you exercise enough to make your heart beat faster? 3 or less    How many minutes a day do you exercise enough to make your heart beat faster? 9 or less    How many days per week do you miss taking your medication? 0    On metoprolol now - not effective.  Used to be on amlodipine - gave her headaches.    Used to be on labetaolol 100 mg BID 2016.  Also was on nifedical Dr. Morocho - 2016 Nifedical    Yes    Yes  Due to language barrier, an  was present during the history-taking and subsequent discussion (and for part of the physical exam) with this patient.      Review of Systems   Constitutional, HEENT, cardiovascular, pulmonary, gi and gu systems are negative, except as otherwise noted.      Objective    BP (!) 159/89 (BP Location: Right arm, Patient Position: Sitting, Cuff Size: Adult Large)   Pulse 94   Temp 96.9  F (36.1  C) (Temporal)   LMP 10/01/2020 (Approximate)   SpO2 95%   There is no height or weight on file to calculate BMI.  Physical Exam   GENERAL: healthy, alert and no distress  RESP: lungs clear to auscultation - no rales, rhonchi or wheezes  CV: regular rate and rhythm, normal S1 S2, no S3 or S4, no murmur, click or rub, no peripheral edema and peripheral pulses  strong  MS: wheelchair bound, paraplegia

## 2021-01-27 ENCOUNTER — OFFICE VISIT (OUTPATIENT)
Dept: FAMILY MEDICINE | Facility: CLINIC | Age: 36
End: 2021-01-27
Payer: COMMERCIAL

## 2021-01-27 ENCOUNTER — TELEPHONE (OUTPATIENT)
Dept: FAMILY MEDICINE | Facility: CLINIC | Age: 36
End: 2021-01-27

## 2021-01-27 VITALS
OXYGEN SATURATION: 95 % | SYSTOLIC BLOOD PRESSURE: 159 MMHG | HEART RATE: 94 BPM | TEMPERATURE: 96.9 F | DIASTOLIC BLOOD PRESSURE: 89 MMHG

## 2021-01-27 DIAGNOSIS — M75.42 IMPINGEMENT SYNDROME OF LEFT SHOULDER: ICD-10-CM

## 2021-01-27 DIAGNOSIS — I10 ESSENTIAL HYPERTENSION: ICD-10-CM

## 2021-01-27 DIAGNOSIS — Z76.89 ENCOUNTER TO ESTABLISH CARE WITH NEW DOCTOR: Primary | ICD-10-CM

## 2021-01-27 DIAGNOSIS — E55.9 VITAMIN D DEFICIENCY: ICD-10-CM

## 2021-01-27 DIAGNOSIS — G82.20 PARAPLEGIA (H): ICD-10-CM

## 2021-01-27 PROCEDURE — 99214 OFFICE O/P EST MOD 30 MIN: CPT | Performed by: FAMILY MEDICINE

## 2021-01-27 RX ORDER — LABETALOL 100 MG/1
100 TABLET, FILM COATED ORAL 2 TIMES DAILY
Qty: 180 TABLET | Refills: 3 | Status: SHIPPED | OUTPATIENT
Start: 2021-01-27 | End: 2021-07-12

## 2021-01-27 NOTE — TELEPHONE ENCOUNTER
1. Faxed DME orders to [employer] [phone] and faxed to [phone]     2. Faxed physical therapy orders to [facility] [place] physical therapy - [phone]    Selene Holloway CMA on 1/27/2021 at 12:02 PM

## 2021-01-27 NOTE — PATIENT INSTRUCTIONS
For your blood pressure:  1. STOP your metoprolol.  2. START labetalol 1 tab in the morning and 1 tab in the evening.

## 2021-02-01 ENCOUNTER — TELEPHONE (OUTPATIENT)
Dept: OPHTHALMOLOGY | Facility: CLINIC | Age: 36
End: 2021-02-01

## 2021-02-04 ENCOUNTER — OFFICE VISIT (OUTPATIENT)
Dept: OPHTHALMOLOGY | Facility: CLINIC | Age: 36
End: 2021-02-04
Attending: FAMILY MEDICINE
Payer: COMMERCIAL

## 2021-02-04 DIAGNOSIS — Z76.89 ENCOUNTER TO ESTABLISH CARE WITH NEW DOCTOR: ICD-10-CM

## 2021-02-04 DIAGNOSIS — H52.223 REGULAR ASTIGMATISM OF BOTH EYES: Primary | ICD-10-CM

## 2021-02-04 DIAGNOSIS — H52.4 ACCOMMODATIVE INSUFFICIENCY: ICD-10-CM

## 2021-02-04 PROCEDURE — 92004 COMPRE OPH EXAM NEW PT 1/>: CPT | Performed by: OPTOMETRIST

## 2021-02-04 PROCEDURE — 92015 DETERMINE REFRACTIVE STATE: CPT | Mod: GY | Performed by: OPTOMETRIST

## 2021-02-04 ASSESSMENT — REFRACTION_MANIFEST
OD_SPHERE: PLANO
OS_SPHERE: -0.25
OD_AXIS: 180
OS_ADD: +1.75
OS_CYLINDER: +0.75
OD_CYLINDER: +0.25
OD_ADD: +1.75
OS_AXIS: 175

## 2021-02-04 ASSESSMENT — REFRACTION
OD_AXIS: 180
OS_AXIS: 175
OS_CYLINDER: +1.25
OS_SPHERE: -0.50
OD_SPHERE: -0.50
OD_CYLINDER: +0.75

## 2021-02-04 ASSESSMENT — VISUAL ACUITY
METHOD: SNELLEN - LINEAR
CORRECTION_TYPE: GLASSES
OD_SC: 20/20
OS_SC: 20/40
OS_PH_SC: 20/25
OS_PH_SC+: -2

## 2021-02-04 ASSESSMENT — EXTERNAL EXAM - LEFT EYE: OS_EXAM: NORMAL

## 2021-02-04 ASSESSMENT — REFRACTION_WEARINGRX
OD_AXIS: 173
OD_CYLINDER: +0.25
OD_SPHERE: +1.50
OS_SPHERE: +1.25
OS_CYLINDER: +0.25
OS_AXIS: 175

## 2021-02-04 ASSESSMENT — TONOMETRY
OS_IOP_MMHG: 17
OD_IOP_MMHG: 17
IOP_METHOD: ICARE

## 2021-02-04 ASSESSMENT — EXTERNAL EXAM - RIGHT EYE: OD_EXAM: NORMAL

## 2021-02-04 ASSESSMENT — CONF VISUAL FIELD
OS_NORMAL: 1
OD_NORMAL: 1
METHOD: COUNTING FINGERS

## 2021-02-04 ASSESSMENT — SLIT LAMP EXAM - LIDS
COMMENTS: NORMAL
COMMENTS: NORMAL

## 2021-02-04 ASSESSMENT — CUP TO DISC RATIO
OD_RATIO: 0.55
OS_RATIO: 0.6

## 2021-02-04 NOTE — PROGRESS NOTES
History  HPI     COMPREHENSIVE EYE EXAM     In both eyes.  Charactertized as  blurred vision.  Context:  near vision.  Associated symptoms include Negative for dryness, eye pain, tearing, flashes and floaters.  Treatments tried include no treatments.  Pain was noted as 0/10.              Comments     Patient is here today for eye exam today, she states her VA is blurred at near, she notes that she wears gls for near, they are prescription gls.     Hailee Terrell COT February 4, 2021 1:49 PM            Last edited by Roxanne Terrell on 2/4/2021  1:52 PM. (History)          Assessment/Plan  (H52.223) Regular astigmatism of both eyes  (primary encounter diagnosis)  Comment: Minimal refractive error, decreased vision at near; happy with reading glasses  Plan:  Educated patient on condition and clinical findings. Dispensed spectacle prescription for near vision only. Monitor annually.    (H52.4) Accommodative insufficiency  Comment: See above    (Z76.89) Encounter to establish care with new doctor  Comment: Referred for eye exam  Plan:  Copy of chart sent to Dr. Shankar.    Return to clinic in 1 year for comprehensive eye exam.    Complete documentation of historical and exam elements from today's encounter can  be found in the full encounter summary report (not reduplicated in this progress  note). I personally obtained the chief complaint(s) and history of present illness. I  confirmed and edited as necessary the review of systems, past medical/surgical  history, family history, social history, and examination findings as documented by  others; and I examined the patient myself. I personally reviewed the relevant tests,  images, and reports as documented above. I formulated and edited as necessary the  assessment and plan and discussed the findings and management plan with the  patient and family.    Yoel Lentz, OD, FAAO

## 2021-02-04 NOTE — NURSING NOTE
Chief Complaints and History of Present Illnesses   Patient presents with     COMPREHENSIVE EYE EXAM     Chief Complaint(s) and History of Present Illness(es)     COMPREHENSIVE EYE EXAM     Laterality: both eyes    Quality: blurred vision    Context: near vision    Associated symptoms: Negative for dryness, eye pain, tearing, flashes and floaters    Treatments tried: no treatments    Pain scale: 0/10              Comments     Patient is here today for eye exam today, she states her VA is blurred at near, she notes that she wears gls for near, they are prescription gls.     Hailee Terrell COT February 4, 2021 1:49 PM

## 2021-02-04 NOTE — Clinical Note
Thank you for referring Jieclovisjens MARY Bang for her annual eye exam.  Ocular health unremarkable on examination today.  Glasses prescribed for near vision only.  Recommended repeat evaluation in 1 year.  Please contact me with any questions.  Yoel Lentz, OD on 2/4/2021 at 2:37 PM

## 2021-03-22 ENCOUNTER — OFFICE VISIT (OUTPATIENT)
Dept: FAMILY MEDICINE | Facility: CLINIC | Age: 36
End: 2021-03-22
Payer: COMMERCIAL

## 2021-03-22 VITALS
HEART RATE: 91 BPM | DIASTOLIC BLOOD PRESSURE: 82 MMHG | SYSTOLIC BLOOD PRESSURE: 126 MMHG | TEMPERATURE: 97.8 F | OXYGEN SATURATION: 96 %

## 2021-03-22 DIAGNOSIS — G82.20 PARAPLEGIA (H): Primary | ICD-10-CM

## 2021-03-22 DIAGNOSIS — I10 ESSENTIAL HYPERTENSION: ICD-10-CM

## 2021-03-22 DIAGNOSIS — J45.30 MILD PERSISTENT REACTIVE AIRWAY DISEASE WITHOUT COMPLICATION: ICD-10-CM

## 2021-03-22 DIAGNOSIS — Z71.85 VACCINE COUNSELING: ICD-10-CM

## 2021-03-22 DIAGNOSIS — E55.9 VITAMIN D DEFICIENCY: ICD-10-CM

## 2021-03-22 DIAGNOSIS — E03.9 ACQUIRED HYPOTHYROIDISM: ICD-10-CM

## 2021-03-22 PROCEDURE — 99214 OFFICE O/P EST MOD 30 MIN: CPT | Performed by: PHYSICIAN ASSISTANT

## 2021-03-22 RX ORDER — FLUTICASONE PROPIONATE 44 UG/1
1 AEROSOL, METERED RESPIRATORY (INHALATION) 2 TIMES DAILY
Qty: 10.6 G | Refills: 11 | Status: SHIPPED | OUTPATIENT
Start: 2021-03-22 | End: 2021-07-12

## 2021-03-22 RX ORDER — ALBUTEROL SULFATE 90 UG/1
1-2 AEROSOL, METERED RESPIRATORY (INHALATION) EVERY 4 HOURS PRN
Qty: 18 G | Refills: 11 | Status: SHIPPED | OUTPATIENT
Start: 2021-03-22 | End: 2021-07-26

## 2021-03-22 NOTE — PROGRESS NOTES
Assessment & Plan       ICD-10-CM    1. Paraplegia (H)  G82.20    2. Acquired hypothyroidism  E03.9 TSH with free T4 reflex   3. Essential hypertension  I10 Basic metabolic panel   4. Mild persistent reactive airway disease without complication  J45.30 albuterol (PROAIR HFA/PROVENTIL HFA/VENTOLIN HFA) 108 (90 Base) MCG/ACT inhaler     fluticasone (FLOVENT HFA) 44 MCG/ACT inhaler   5. Vaccine counseling  Z71.89    6. Vitamin D deficiency  E55.9 Vitamin D Deficiency     - Recommend patient visit [facility] to test out different options to help with left-leaning issues (?cushions, arm rest adjustment, harness) and they can send any necessary orders over for my signature.  - Routine labs pending. Euthyroid by reported symptoms and HTN well-controlled.  - Asthma well-controlled on current regimen, refills as above.  - Recommend patient get COVID vaccine, discussed risks vs benefits, however patient states she will think about it and possible schedule at later date.  - Appt with Dr Pascual schedule for patient by my staff    Return in about 23 days (around 4/14/2021) for Specialist appointment as scheduled.    Gina Eli PA-C  [facility] EUN Lo is a 36 year old who presents for the following health issues     HPI     Concern - Discuss bone density     Concern - Discuss DME wheelchair     Description: patient is complaining about her wheelchair needing to be replaced/new   Because her body is leaning to the left and she feels uncomfortable     - Patient here to re-establish care.  - Would like adjustment to her wheelchair due to her body leaning left, causes discomfort as she presses against the arm rest all day.  - Recommended to see Dr Pascual to discuss bone density by her PM&R physician, this was never scheduled.  - Needs refills of inhalers  - Overdue for routine labs    Review of Systems   Constitutional, HEENT, cardiovascular, pulmonary, GI, , musculoskeletal,  neuro, skin, endocrine and psych systems are negative, except as otherwise noted.      Objective    /82   Pulse 91   Temp 97.8  F (36.6  C) (Tympanic)   SpO2 96%   There is no height or weight on file to calculate BMI.  Physical Exam   GENERAL:  WDWN, no acute distress  PSYCH: pleasant, cooperative  EYES: no discharge, no injection  HENT:  Normocephalic. Moist mucus membranes.  NECK:  Supple, symmetric  LUNGS:  Clear to auscultation bilaterally without rhonchi, rales, or wheeze. Chest rise symmetric and no tenderness to palpation.  HEART:  Regular rate & rhythm. No murmur, gallop, or rub.  SKIN:  Warm and dry, no rash or suspicious lesions    NEUROLOGIC:  alert, sitting comfortably in wheelchair

## 2021-03-23 ASSESSMENT — ASTHMA QUESTIONNAIRES: ACT_TOTALSCORE: 25

## 2021-04-14 ENCOUNTER — VIRTUAL VISIT (OUTPATIENT)
Dept: PHYSICAL MEDICINE AND REHAB | Facility: CLINIC | Age: 36
End: 2021-04-14
Attending: FAMILY MEDICINE
Payer: COMMERCIAL

## 2021-04-14 DIAGNOSIS — G82.20 PARAPLEGIA (H): ICD-10-CM

## 2021-04-14 DIAGNOSIS — M81.8 OTHER OSTEOPOROSIS WITHOUT CURRENT PATHOLOGICAL FRACTURE: Primary | ICD-10-CM

## 2021-04-14 PROCEDURE — 99215 OFFICE O/P EST HI 40 MIN: CPT | Mod: 95 | Performed by: PHYSICAL MEDICINE & REHABILITATION
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
## 2021-04-15 ENCOUNTER — OFFICE VISIT (OUTPATIENT)
Dept: PHYSICAL MEDICINE AND REHAB | Facility: CLINIC | Age: 36
End: 2021-04-15
Payer: COMMERCIAL

## 2021-04-15 VITALS
SYSTOLIC BLOOD PRESSURE: 178 MMHG | DIASTOLIC BLOOD PRESSURE: 88 MMHG | OXYGEN SATURATION: 99 % | TEMPERATURE: 97.8 F | HEART RATE: 70 BPM | RESPIRATION RATE: 16 BRPM | WEIGHT: 165.7 LBS | BODY MASS INDEX: 27.57 KG/M2

## 2021-04-15 DIAGNOSIS — G82.22 CHRONIC INCOMPLETE SPASTIC PARAPLEGIA (H): ICD-10-CM

## 2021-04-15 DIAGNOSIS — M62.838 MUSCLE SPASTICITY: ICD-10-CM

## 2021-04-15 DIAGNOSIS — G82.20 PARAPLEGIA (H): Primary | ICD-10-CM

## 2021-04-15 PROCEDURE — 95874 GUIDE NERV DESTR NEEDLE EMG: CPT | Performed by: PHYSICAL MEDICINE & REHABILITATION

## 2021-04-15 PROCEDURE — 64643 CHEMODENERV 1 EXTREM 1-4 EA: CPT | Performed by: PHYSICAL MEDICINE & REHABILITATION

## 2021-04-15 PROCEDURE — 64642 CHEMODENERV 1 EXTREMITY 1-4: CPT | Performed by: PHYSICAL MEDICINE & REHABILITATION

## 2021-04-15 ASSESSMENT — PAIN SCALES - GENERAL: PAINLEVEL: NO PAIN (0)

## 2021-04-15 NOTE — NURSING NOTE
Chief Complaint   Patient presents with     Botox     [facility] Return Botox - 12 wk botox follow up, bilateral legs     Garrett Alcantara

## 2021-04-15 NOTE — PROGRESS NOTES
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 She is accompanied by her brother today. She was followed by Dr. Dejesus and was last seen in our clinic 10/22/20.      Last series of injections were done 1/21/2021. She is very pleased with the results.  Denies any side effects. She believes the benefits started to wear off at 10 week time point; very similar to the previous injections.  Severity of spasticity is much improved with the botulinum toxin. Deformities and ROM do not change as expected.     She has a power WC (new since 2018). Does not use braces of any kind.     Medically, she has been stable with no ER visit or hospitalization. She is followed by her PCP regarding HTN and HLD management.       Physical exam:  BP (!) 178/88   Pulse 70   Temp 97.8  F (36.6  C)   Resp 16   Wt 75.2 kg (165 lb 11.2 oz)   SpO2 99%   BMI 27.57 kg/m       She was alert pleasant, sitting comfortably in power WC.     Involuntary spasms are noted in her bilateral lower extremities. These exacerbate with attempted passive range of motion.  Modified Lorin 3/4 in adductor and KF muscles b/l, 4/4 versus fixed contracture in the bilateral ankles and toes with severe mid-foot / ankle plantar flexion inversion positioning.   Overlying skin in the thighs hamstrings is healthy without lesions or evidence of skin breakdown.    Assessment and recommendations:  Teresita is doing well. She responds well to Botox injections for the management of spasticity and associated symptoms. Contractures and deformities in her feet/ankle are not expected to change with this procedure. No change in the total dose or sites of injections today. Had a virtual visit with Dr. Pascual and will pursue work-up as recommended.    Teresita has severe spasticity in her lower extremities related to  her spinal cord injury and has benefited from chemodenervation with botulinum toxin in the past.  We will repeat these injections today.   Will continue with hamstrings, gastrocnemius, and adductors.  She should continue with positioning and stretching exercises.  Follow-up in 3 months time, will call sooner if any side effects or other concerns arise.       Procedure: Chemodenervation to bilateral lower extremities utilizing onabotulinum toxin type A, Botox. I reviewed at length the risks benefits and potential time course for onset and duration. She agrees and signed the consent form today with her single initial.   present and answered all questions for this process. Total 400 units of Botox Lot # [id number] with Expiration Date:  was utilized. NDC #1579-0273-97. Diluted with preservative-free normal saline ratio of 100 units per milliliter. This was divided into 4 syringes. 27-gauge recording EMG needle was utilized in conjunction with EMG guidance to identify the most active motor units while avoiding surrounding structures. All areas were cleansed with ChloraPrep. Injections are done in her WC in recline position. The following muscles were then injected.    Left le. Hamstring, 100 units divided over 2 sites  2. Gastrocnemius, 50 units divided over 2 sites  3. Adductors 50 units     Right le. Hamstring, 100 units divided over 2 sites  2. Gastrocnemius, 50 units divided over 2 sites  3. Adductors 50 units       Kathryn Prado MD  Physical Medicine & Rehabilitation

## 2021-04-15 NOTE — LETTER
4/15/2021       RE: Teresita Bang  [address]  [facility] [address]     Dear Colleague,    Thank you for referring your patient, Teresita Bang, to the [facility] PHYSICAL MEDICINE AND REHABILITATION CLINIC [place] at [facility]. Please see a copy of my visit note below.    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 She is accompanied by her brother today. She was followed by Dr. Dejesus and was last seen in our clinic 10/22/20.      Last series of injections were done 1/21/2021. She is very pleased with the results.  Denies any side effects. She believes the benefits started to wear off at 10 week time point; very similar to the previous injections.  Severity of spasticity is much improved with the botulinum toxin. Deformities and ROM do not change as expected.     She has a power WC (new since 2018). Does not use braces of any kind.     Medically, she has been stable with no ER visit or hospitalization. She is followed by her PCP regarding HTN and HLD management.       Physical exam:  BP (!) 178/88   Pulse 70   Temp 97.8  F (36.6  C)   Resp 16   Wt 75.2 kg (165 lb 11.2 oz)   SpO2 99%   BMI 27.57 kg/m       She was alert pleasant, sitting comfortably in power WC.     Involuntary spasms are noted in her bilateral lower extremities. These exacerbate with attempted passive range of motion.  Modified Lorin 3/4 in adductor and KF muscles b/l, 4/4 versus fixed contracture in the bilateral ankles and toes with severe mid-foot / ankle plantar flexion inversion positioning.   Overlying skin in the thighs hamstrings is healthy without lesions or evidence of skin breakdown.    Assessment and recommendations:  Teresita is doing well. She responds well to Botox injections  for the management of spasticity and associated symptoms. Contractures and deformities in her feet/ankle are not expected to change with this procedure. No change in the total dose or sites of injections today. Had a virtual visit with Dr. Pascual and will pursue work-up as recommended.    Teresita has severe spasticity in her lower extremities related to her spinal cord injury and has benefited from chemodenervation with botulinum toxin in the past.  We will repeat these injections today.   Will continue with hamstrings, gastrocnemius, and adductors.  She should continue with positioning and stretching exercises.  Follow-up in 3 months time, will call sooner if any side effects or other concerns arise.       Procedure: Chemodenervation to bilateral lower extremities utilizing onabotulinum toxin type A, Botox. I reviewed at length the risks benefits and potential time course for onset and duration. She agrees and signed the consent form today with her single initial.   present and answered all questions for this process. Total 400 units of Botox Lot # [id number] with Expiration Date:  was utilized. NDC #5314-5511-87. Diluted with preservative-free normal saline ratio of 100 units per milliliter. This was divided into 4 syringes. 27-gauge recording EMG needle was utilized in conjunction with EMG guidance to identify the most active motor units while avoiding surrounding structures. All areas were cleansed with ChloraPrep. Injections are done in her WC in recline position. The following muscles were then injected.    Left le. Hamstring, 100 units divided over 2 sites  2. Gastrocnemius, 50 units divided over 2 sites  3. Adductors 50 units     Right le. Hamstring, 100 units divided over 2 sites  2. Gastrocnemius, 50 units divided over 2 sites  3. Adductors 50 units       Kathryn Prado MD  Physical Medicine & Rehabilitation

## 2021-04-19 ENCOUNTER — TELEPHONE (OUTPATIENT)
Dept: FAMILY MEDICINE | Facility: CLINIC | Age: 36
End: 2021-04-19

## 2021-05-10 ENCOUNTER — TELEPHONE (OUTPATIENT)
Dept: FAMILY MEDICINE | Facility: CLINIC | Age: 36
End: 2021-05-10

## 2021-05-10 NOTE — TELEPHONE ENCOUNTER
Reason for Call:  Form, our goal is to have forms completed with 72 hours, however, some forms may require a visit or additional information.    Type of letter, form or note:  medical    Who is the form from?: [employer] (if other please explain)    Where did the form come from: form was faxed in    What clinic location was the form placed at?: [facility]    Where the form was placed: placed in pod B in providers signature folder Box/Folder    What number is listed as a contact on the form?: [phone]       Additional comments:     Call taken on 5/10/2021 at 1:12 PM by Thais Kaye

## 2021-05-12 ENCOUNTER — TRANSCRIBE ORDERS (OUTPATIENT)
Dept: FAMILY MEDICINE | Facility: CLINIC | Age: 36
End: 2021-05-12

## 2021-05-12 DIAGNOSIS — G82.20 PARAPLEGIA (H): Primary | ICD-10-CM

## 2021-05-14 ENCOUNTER — TELEPHONE (OUTPATIENT)
Dept: FAMILY MEDICINE | Facility: CLINIC | Age: 36
End: 2021-05-14

## 2021-05-14 NOTE — TELEPHONE ENCOUNTER
DME order received, completed by Sreedhar and faxed to [facility] at [phone].   Thank you,   Jenifer Mccollum

## 2021-06-30 DIAGNOSIS — J31.0 CHRONIC RHINITIS: ICD-10-CM

## 2021-06-30 RX ORDER — CETIRIZINE HYDROCHLORIDE TABLETS 10 MG/1
TABLET, FILM COATED ORAL
Qty: 90 TABLET | Refills: 11 | Status: SHIPPED | OUTPATIENT
Start: 2021-06-30 | End: 2021-07-12

## 2021-07-08 ENCOUNTER — OFFICE VISIT (OUTPATIENT)
Dept: PHYSICAL MEDICINE AND REHAB | Facility: CLINIC | Age: 36
End: 2021-07-08
Payer: COMMERCIAL

## 2021-07-08 ENCOUNTER — APPOINTMENT (OUTPATIENT)
Dept: INTERPRETER SERVICES | Facility: CLINIC | Age: 36
End: 2021-07-08
Payer: COMMERCIAL

## 2021-07-08 VITALS
RESPIRATION RATE: 16 BRPM | BODY MASS INDEX: 26.96 KG/M2 | DIASTOLIC BLOOD PRESSURE: 90 MMHG | TEMPERATURE: 97.9 F | WEIGHT: 162 LBS | HEART RATE: 73 BPM | SYSTOLIC BLOOD PRESSURE: 172 MMHG | OXYGEN SATURATION: 97 %

## 2021-07-08 DIAGNOSIS — G82.22 INCOMPLETE PARAPLEGIA (H): ICD-10-CM

## 2021-07-08 DIAGNOSIS — M62.838 SPASM OF MUSCLE: Primary | ICD-10-CM

## 2021-07-08 DIAGNOSIS — G82.22 CHRONIC INCOMPLETE SPASTIC PARAPLEGIA (H): ICD-10-CM

## 2021-07-08 PROCEDURE — 95874 GUIDE NERV DESTR NEEDLE EMG: CPT | Mod: GC | Performed by: PHYSICAL MEDICINE & REHABILITATION

## 2021-07-08 PROCEDURE — 64642 CHEMODENERV 1 EXTREMITY 1-4: CPT | Mod: GC | Performed by: PHYSICAL MEDICINE & REHABILITATION

## 2021-07-08 PROCEDURE — 64643 CHEMODENERV 1 EXTREM 1-4 EA: CPT | Mod: GC | Performed by: PHYSICAL MEDICINE & REHABILITATION

## 2021-07-08 RX ORDER — IBUPROFEN 200 MG
500 CAPSULE ORAL
COMMUNITY
Start: 2021-05-24 | End: 2021-11-02

## 2021-07-08 ASSESSMENT — PAIN SCALES - GENERAL: PAINLEVEL: NO PAIN (0)

## 2021-07-08 NOTE — LETTER
7/8/2021       RE: Teresita Bang  [address]  [facility] [address]     Dear Colleague,    Thank you for referring your patient, Teresita Bang, to the [facility] PHYSICAL MEDICINE AND REHABILITATION CLINIC [place] at [facility]. Please see a copy of my visit note below.    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 She is accompanied by her brother today. She was followed by Dr. Dejesus in the past and was last seen in our clinic  4/15/21.      Last series of injections were done 4/15/2021. She is very pleased with the results.  Denies any side effects. She believes the benefits started to wear off at 10 week time point; very similar to the previous injections and continues to receive benefit from  he intervention.  Severity of spasticity is much improved with the botulinum toxin. Deformities and ROM do not change as expected.     She has a power WC (new since 2018). Does not use braces of any kind.     Medically, she has been stable with no ER visit or hospitalization. She is followed by her PCP regarding HTN and HLD management.       Physical exam:  BP (!) 172/90   Pulse 73   Temp 97.9  F (36.6  C)   Resp 16   SpO2 97%      She was alert pleasant, sitting comfortably in power WC.     Involuntary spasms are noted in her bilateral lower extremities. These exacerbate with attempted passive range of motion.  Modified Lorin 3/4 in adductor and KF muscles b/l, 4/4 versus fixed contracture in the bilateral ankles and toes with severe mid-foot / ankle plantar flexion inversion positioning.   Overlying skin in the thighs hamstrings is healthy without lesions or evidence of skin breakdown.    Assessment and recommendations:  Teresita is doing well. She responds well to  Botox injections for the management of spasticity and associated symptoms. Contractures and deformities in her feet/ankle are not expected to change with this procedure. No change in the total dose or sites of injections today.    Teresita has severe spasticity in her lower extremities related to her spinal cord injury and has benefited from chemodenervation with botulinum toxin in the past.  We will repeat these injections today.   Will continue with hamstrings, gastrocnemius, and adductors.  She should continue with positioning and stretching exercises.    She has not started PT (ordered by Dr. Pascual); gave her the number to call and make an appointment. Also reminded her of the recommended work-up by Dr. Pascual. Vitamin D level was done (34 on 2021) but DEXA is pending.     Her BP was again elevated today. Will send a message to her PCP regarding HTN and DEXA scan.     Follow-up in 3 months time, will call sooner if any side effects or other concerns arise.       Procedure: Chemodenervation to bilateral lower extremities utilizing onabotulinum toxin type A, Botox. I reviewed at length the risks benefits and potential time course for onset and duration. She agrees and signed the consent form today with her single initial.   present and answered all questions for this process. Total 400 units of Botox Lot # [id number] with Expiration Date: 10/2023 was utilized. NDC #4329-8245-18 Diluted with preservative-free normal saline ratio of 100 units per milliliter. This was divided into 4 syringes. 27-gauge recording EMG needle was utilized in conjunction with EMG guidance to identify the most active motor units while avoiding surrounding structures. All areas were cleansed with ChloraPrep. Injections are done in her WC in recline position. The following muscles were then injected.    Left le. Hamstring, 100 units divided over 2 sites  2. Gastrocnemius, 50 units divided over 2 sites  3. Adductors 50 units      Right le. Hamstring, 100 units divided over 2 sites  2. Gastrocnemius, 50 units divided over 2 sites  3. Adductors 50 units     Olivier Karimi MD  Physical Medicine & Rehabilitation PGY-2      Physician Attestation   I, Kathryn Prado MD, saw this patient and agree with the findings and plan of care as documented in the note.      Items personally reviewed/procedural attestation: vitals, labs, other providers' notes and agree with the interpretation documented in the note and I was present for and supervised the entire procedure.    Kathryn Prado MD          Again, thank you for allowing me to participate in the care of your patient.      Sincerely,    Kathryn Prado MD

## 2021-07-08 NOTE — PROGRESS NOTES
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 She is accompanied by her brother today. She was followed by Dr. Dejesus in the past and was last seen in our clinic  4/15/21.      Last series of injections were done 4/15/2021. She is very pleased with the results.  Denies any side effects. She believes the benefits started to wear off at 10 week time point; very similar to the previous injections and continues to receive benefit from  he intervention.  Severity of spasticity is much improved with the botulinum toxin. Deformities and ROM do not change as expected.     She has a power WC (new since 2018). Does not use braces of any kind.     Medically, she has been stable with no ER visit or hospitalization. She is followed by her PCP regarding HTN and HLD management.       Physical exam:  BP (!) 172/90   Pulse 73   Temp 97.9  F (36.6  C)   Resp 16   SpO2 97%      She was alert pleasant, sitting comfortably in power WC.     Involuntary spasms are noted in her bilateral lower extremities. These exacerbate with attempted passive range of motion.  Modified Lorin 3/4 in adductor and KF muscles b/l, 4/4 versus fixed contracture in the bilateral ankles and toes with severe mid-foot / ankle plantar flexion inversion positioning.   Overlying skin in the thighs hamstrings is healthy without lesions or evidence of skin breakdown.    Assessment and recommendations:  Teresita is doing well. She responds well to Botox injections for the management of spasticity and associated symptoms. Contractures and deformities in her feet/ankle are not expected to change with this procedure. No change in the total dose or sites of injections today.    Teresita has severe spasticity in her lower extremities related to her spinal cord injury and has benefited from  chemodenervation with botulinum toxin in the past.  We will repeat these injections today.   Will continue with hamstrings, gastrocnemius, and adductors.  She should continue with positioning and stretching exercises.    She has not started PT (ordered by Dr. Pascual); gave her the number to call and make an appointment. Also reminded her of the recommended work-up by Dr. Pascual. Vitamin D level was done (34 on 2021) but DEXA is pending.     Her BP was again elevated today. Will send a message to her PCP regarding HTN and DEXA scan.     Follow-up in 3 months time, will call sooner if any side effects or other concerns arise.       Procedure: Chemodenervation to bilateral lower extremities utilizing onabotulinum toxin type A, Botox. I reviewed at length the risks benefits and potential time course for onset and duration. She agrees and signed the consent form today with her single initial.   present and answered all questions for this process. Total 400 units of Botox Lot # [id number] with Expiration Date: 10/2023 was utilized. NDC #5801-8477-04 Diluted with preservative-free normal saline ratio of 100 units per milliliter. This was divided into 4 syringes. 27-gauge recording EMG needle was utilized in conjunction with EMG guidance to identify the most active motor units while avoiding surrounding structures. All areas were cleansed with ChloraPrep. Injections are done in her WC in recline position. The following muscles were then injected.    Left le. Hamstring, 100 units divided over 2 sites  2. Gastrocnemius, 50 units divided over 2 sites  3. Adductors 50 units     Right le. Hamstring, 100 units divided over 2 sites  2. Gastrocnemius, 50 units divided over 2 sites  3. Adductors 50 units     Olivier Karimi MD  Physical Medicine & Rehabilitation PGY-2      Physician Attestation   I, Kathryn Prado MD, saw this patient and agree with the findings and plan of care as documented in the note.       Items personally reviewed/procedural attestation: vitals, labs, other providers' notes and agree with the interpretation documented in the note and I was present for and supervised the entire procedure.    Kathryn Prado MD

## 2021-07-12 ENCOUNTER — OFFICE VISIT (OUTPATIENT)
Dept: FAMILY MEDICINE | Facility: CLINIC | Age: 36
End: 2021-07-12
Payer: COMMERCIAL

## 2021-07-12 ENCOUNTER — LAB (OUTPATIENT)
Dept: LAB | Facility: CLINIC | Age: 36
End: 2021-07-12
Payer: COMMERCIAL

## 2021-07-12 VITALS
OXYGEN SATURATION: 99 % | DIASTOLIC BLOOD PRESSURE: 90 MMHG | BODY MASS INDEX: 26.03 KG/M2 | HEART RATE: 78 BPM | SYSTOLIC BLOOD PRESSURE: 152 MMHG | TEMPERATURE: 97 F | HEIGHT: 66 IN | WEIGHT: 162 LBS

## 2021-07-12 DIAGNOSIS — E03.9 ACQUIRED HYPOTHYROIDISM: ICD-10-CM

## 2021-07-12 DIAGNOSIS — Z00.00 ROUTINE GENERAL MEDICAL EXAMINATION AT A HEALTH CARE FACILITY: Primary | ICD-10-CM

## 2021-07-12 DIAGNOSIS — Z23 NEED FOR VACCINATION: ICD-10-CM

## 2021-07-12 DIAGNOSIS — E55.9 VITAMIN D DEFICIENCY: ICD-10-CM

## 2021-07-12 DIAGNOSIS — J31.0 CHRONIC RHINITIS: ICD-10-CM

## 2021-07-12 DIAGNOSIS — I10 ESSENTIAL HYPERTENSION: ICD-10-CM

## 2021-07-12 DIAGNOSIS — Z11.3 SCREEN FOR STD (SEXUALLY TRANSMITTED DISEASE): ICD-10-CM

## 2021-07-12 DIAGNOSIS — J45.30 MILD PERSISTENT REACTIVE AIRWAY DISEASE WITHOUT COMPLICATION: ICD-10-CM

## 2021-07-12 LAB
BASOPHILS # BLD AUTO: 0 10E3/UL (ref 0–0.2)
BASOPHILS NFR BLD AUTO: 1 %
EOSINOPHIL # BLD AUTO: 0.6 10E3/UL (ref 0–0.7)
EOSINOPHIL NFR BLD AUTO: 8 %
ERYTHROCYTE [DISTWIDTH] IN BLOOD BY AUTOMATED COUNT: 16.1 % (ref 10–15)
HCT VFR BLD AUTO: 38.5 % (ref 35–47)
HGB BLD-MCNC: 12.4 G/DL (ref 11.7–15.7)
LYMPHOCYTES # BLD AUTO: 2.6 10E3/UL (ref 0.8–5.3)
LYMPHOCYTES NFR BLD AUTO: 32 %
MCH RBC QN AUTO: 27.7 PG (ref 26.5–33)
MCHC RBC AUTO-ENTMCNC: 32.2 G/DL (ref 31.5–36.5)
MCV RBC AUTO: 86 FL (ref 78–100)
MONOCYTES # BLD AUTO: 0.6 10E3/UL (ref 0–1.3)
MONOCYTES NFR BLD AUTO: 8 %
NEUTROPHILS # BLD AUTO: 4.3 10E3/UL (ref 1.6–8.3)
NEUTROPHILS NFR BLD AUTO: 52 %
PLATELET # BLD AUTO: 331 10E3/UL (ref 150–450)
RBC # BLD AUTO: 4.48 10E6/UL (ref 3.8–5.2)
WBC # BLD AUTO: 8.2 10E3/UL (ref 4–11)

## 2021-07-12 PROCEDURE — 90715 TDAP VACCINE 7 YRS/> IM: CPT | Performed by: NURSE PRACTITIONER

## 2021-07-12 PROCEDURE — 86592 SYPHILIS TEST NON-TREP QUAL: CPT

## 2021-07-12 PROCEDURE — 84439 ASSAY OF FREE THYROXINE: CPT

## 2021-07-12 PROCEDURE — 80050 GENERAL HEALTH PANEL: CPT

## 2021-07-12 PROCEDURE — 36415 COLL VENOUS BLD VENIPUNCTURE: CPT

## 2021-07-12 PROCEDURE — 90670 PCV13 VACCINE IM: CPT | Performed by: NURSE PRACTITIONER

## 2021-07-12 PROCEDURE — 99214 OFFICE O/P EST MOD 30 MIN: CPT | Mod: 25 | Performed by: NURSE PRACTITIONER

## 2021-07-12 PROCEDURE — 90472 IMMUNIZATION ADMIN EACH ADD: CPT | Performed by: NURSE PRACTITIONER

## 2021-07-12 PROCEDURE — 86780 TREPONEMA PALLIDUM: CPT

## 2021-07-12 PROCEDURE — 90471 IMMUNIZATION ADMIN: CPT | Performed by: NURSE PRACTITIONER

## 2021-07-12 PROCEDURE — 99395 PREV VISIT EST AGE 18-39: CPT | Mod: 25 | Performed by: NURSE PRACTITIONER

## 2021-07-12 PROCEDURE — 82306 VITAMIN D 25 HYDROXY: CPT

## 2021-07-12 PROCEDURE — 86593 SYPHILIS TEST NON-TREP QUANT: CPT

## 2021-07-12 PROCEDURE — 87389 HIV-1 AG W/HIV-1&-2 AB AG IA: CPT

## 2021-07-12 RX ORDER — FLUTICASONE PROPIONATE 44 UG/1
1 AEROSOL, METERED RESPIRATORY (INHALATION) 2 TIMES DAILY
Qty: 10.6 G | Refills: 11 | Status: SHIPPED | OUTPATIENT
Start: 2021-07-12 | End: 2021-11-02 | Stop reason: ALTCHOICE

## 2021-07-12 RX ORDER — LABETALOL 100 MG/1
100 TABLET, FILM COATED ORAL 2 TIMES DAILY
Qty: 180 TABLET | Refills: 3 | Status: SHIPPED | OUTPATIENT
Start: 2021-07-12 | End: 2021-11-02

## 2021-07-12 RX ORDER — LEVOTHYROXINE SODIUM 112 UG/1
112 TABLET ORAL DAILY
Qty: 90 TABLET | Refills: 1 | Status: SHIPPED | OUTPATIENT
Start: 2021-07-12 | End: 2022-02-15

## 2021-07-12 RX ORDER — CETIRIZINE HYDROCHLORIDE 10 MG/1
10 TABLET ORAL EVERY MORNING
Qty: 90 TABLET | Refills: 3 | Status: SHIPPED | OUTPATIENT
Start: 2021-07-12 | End: 2022-07-14

## 2021-07-12 ASSESSMENT — MIFFLIN-ST. JEOR: SCORE: 1431.58
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
## 2021-07-12 NOTE — PATIENT INSTRUCTIONS
Reminder to schedule your eye exam and dentist visit    Preventive Health Recommendations  Female Ages 26 - 39  Yearly exam:   See your health care provider every year in order to    Review health changes.     Discuss preventive care.      Review your medicines if you your doctor has prescribed any.    Until age 30: Get a Pap test every three years (more often if you have had an abnormal result).    After age 30: Talk to your doctor about whether you should have a Pap test every 3 years or have a Pap test with HPV screening every 5 years.   You do not need a Pap test if your uterus was removed (hysterectomy) and you have not had cancer.  You should be tested each year for STDs (sexually transmitted diseases), if you're at risk.   Talk to your provider about how often to have your cholesterol checked.  If you are at risk for diabetes, you should have a diabetes test (fasting glucose).  Shots: Get a flu shot each year. Get a tetanus shot every 10 years.   Nutrition:     Eat at least 5 servings of fruits and vegetables each day.    Eat whole-grain bread, whole-wheat pasta and brown rice instead of white grains and rice.    Get adequate Calcium and Vitamin D.     Lifestyle    Exercise at least 150 minutes a week (30 minutes a day, 5 days of the week). This will help you control your weight and prevent disease.    Limit alcohol to one drink per day.    No smoking.     Wear sunscreen to prevent skin cancer.    See your dentist every six months for an exam and cleaning.    Patient Education     Common Thyroid Problems  The thyroid is a gland in the neck. It's controlled by the pituitary gland in the brain. The thyroid makes 2 hormones that control how the body uses and stores energy. If there's a problem with the thyroid, the level of thyroid hormones may change. This can lead to symptoms. Thyroid problems can be treated.    Hypothyroidism  This is when the thyroid gland doesn t make enough hormone. The most common cause  of hypothyroidism is Hashimoto thyroiditis. This occurs when the body s immune system mistakenly attacks the thyroid gland. This damages the thyroid so it doesn't make enough hormones. Hypothyroidism may also occur if there s a severe deficiency or an excess of iodine in the body. The thyroid needs iodine to make hormone. Problems with the pituitary gland can lead to not enough production of thyroid hormone. Some medicines can cause hypothyroidism. Hypothyroidism will also occur if the thyroid gland is removed during surgery. Hypothyroidism, can also develop after pregnancy.  Common symptoms include:    Low energy and tiredness    Depression    Feeling cold    Muscle pain    Slowed thinking        Constipation    Heavier menstrual periods with prolonged bleeding     Weight gain    Dry and brittle skin, hair, nails    Excessive sleepiness  Hyperthyroidism  This is when the thyroid gland makes too much hormone. The most common cause is Graves disease. It occurs when the body s immune system mistakenly tells the thyroid to make too much hormone. Another cause is a small bump (nodule) in the thyroid gland. This can cause hyperthyroidism if the cells in the nodule make too much thyroid hormone and stop hormone production in the rest of the thyroid gland.  Common symptoms include:    Shaking, nervousness, irritability    Feeling hot    A rapid or irregular heartbeat    Muscle weakness, fatigue    Tremors in the hands    More frequent bowel movements    [place], lighter. or irregular menstrual periods    Weight loss    Hair loss    Bulging eyes  Nodules  Nodules are lumps of tissue in the thyroid gland. Most often, the cause of nodules isn t known. But they may be more common in people who ve had treatment with radiation to the head or neck. Sometimes nodules can be felt on the outside of the neck. Most of the time, the nodules cause no symptoms and don t affect the amount of thyroid hormone. Most nodules are not cancer.  But sometimes a nodule may be cancer. Risk factors for cancer include age, gender, family history of thyroid cancer, and history of radiation exposure.  What is a goiter?  A goiter is the enlargement of the thyroid gland. When the gland enlarges, you may see or feel a swelling on your neck. A goiter can develop in someone with a normal thyroid, overactive thyroid, or underactive thyroid.  [email or web address] last reviewed this educational content on 3/1/2020    7331-9972 [employer]. All rights reserved. This information is not intended as a substitute for professional medical care. Always follow your healthcare professional's instructions.           Patient Education     Preparing for Pregnancy  Even before you become pregnant, your health matters to your future baby. Adopt good health habits today. And take care of any health problems you have before becoming pregnant.   Remember: As soon as you know you are pregnant, get regular prenatal care.   Things to consider  Read through the list below. The more items that describe you, the healthier you may be:     I eat a balanced diet.    I keep physically active.    I have my health problems under control.    My weight is about right.    I don t smoke.    I don t use recreational drugs.    I don t have a drinking problem.  Think about the following:    Who will help you through pregnancy and with childcare?    Do you have health insurance?    Do you have the money needed to cover childcare and other day-to-day child expenses?    Will you be able to take the time you need away from your job for maternity needs and childcare?  Adopt a healthy lifestyle  Each of the following tips can improve your health as you prepare for pregnancy:     Take folic acid 400 to 800 micrograms or a prenatal vitamin daily.     Eat a healthy, well-balanced diet.    Exercise 3 or more times a week and at least 150 minutes weekly.    Get within 15 pounds of your ideal weight.  The first  weeks of pregnancy are the most important time in a baby s development. Before you become pregnant:     Don t use recreational drugs.    Don t drink alcohol.    Don t smoke.    Get advised vaccines.  Working with your healthcare provider  Your healthcare provider can help answer any questions you may have. Do you know when to stop taking birth control pills? Are any over-the-counter medicines safe for pregnant women? You can also ask about special care you may need if you have any of the following:     Sexually transmitted infections (STIs), such as herpes or chlamydia    Diabetes    High blood pressure    Other long-term (chronic) health problems  [employer] last reviewed this educational content on 8/1/2020 2000-2021 [employer]. All rights reserved. This information is not intended as a substitute for professional medical care. Always follow your healthcare professional's instructions.

## 2021-07-14 LAB
ALBUMIN SERPL-MCNC: 3.8 G/DL (ref 3.4–5)
ALP SERPL-CCNC: 108 U/L (ref 40–150)
ALT SERPL W P-5'-P-CCNC: 24 U/L
ANION GAP SERPL CALCULATED.3IONS-SCNC: 11 MMOL/L (ref 3–14)
AST SERPL W P-5'-P-CCNC: 36 U/L (ref 0–45)
BILIRUB SERPL-MCNC: 0.2 MG/DL (ref 0.2–1.3)
BUN SERPL-MCNC: 8 MG/DL (ref 7–30)
CALCIUM SERPL-MCNC: 9.2 MG/DL (ref 8.5–10.1)
CHLORIDE BLD-SCNC: 104 MMOL/L
CO2 SERPL-SCNC: 22 MMOL/L (ref 20–32)
CREAT SERPL-MCNC: 0.55 MG/DL
DEPRECATED CALCIDIOL+CALCIFEROL SERPL-MC: 30 UG/L (ref 20–75)
GFR SERPL CREATININE-BSD FRML MDRD: >90 ML/MIN/1.73M2
GLUCOSE BLD-MCNC: 71 MG/DL (ref 70–99)
HIV 1+2 AB+HIV1 P24 AG SERPL QL IA: NONREACTIVE
POTASSIUM BLD-SCNC: 4.3 MMOL/L (ref 3.4–5.3)
PROT SERPL-MCNC: 7.8 G/DL (ref 6.8–8.8)
RPR SER QL: REACTIVE
RPR SER-TITR: NORMAL {TITER}
SODIUM SERPL-SCNC: 137 MMOL/L (ref 133–144)
T PALLIDUM AB SER QL: REACTIVE
T4 FREE SERPL-MCNC: 0.93 NG/DL (ref 0.76–1.46)
TSH SERPL DL<=0.005 MIU/L-ACNC: 11.46 MU/L (ref 0.4–4)

## 2021-07-16 ENCOUNTER — TELEPHONE (OUTPATIENT)
Dept: FAMILY MEDICINE | Facility: CLINIC | Age: 36
End: 2021-07-16

## 2021-07-16 NOTE — TELEPHONE ENCOUNTER
Left message for patient to call [facility] back  When patient calls back please transfer to RN  Almita REDMOND RN        Please call with results. Please request her to schedule a follow up visit to discuss positive RPR and TSH elevated needs thyroid follow up .      MARIELLE Suazo CNP

## 2021-07-26 ENCOUNTER — OFFICE VISIT (OUTPATIENT)
Dept: FAMILY MEDICINE | Facility: CLINIC | Age: 36
End: 2021-07-26
Payer: COMMERCIAL

## 2021-07-26 VITALS
OXYGEN SATURATION: 97 % | SYSTOLIC BLOOD PRESSURE: 180 MMHG | TEMPERATURE: 96 F | DIASTOLIC BLOOD PRESSURE: 88 MMHG | HEART RATE: 113 BPM

## 2021-07-26 DIAGNOSIS — J45.30 MILD PERSISTENT REACTIVE AIRWAY DISEASE WITHOUT COMPLICATION: ICD-10-CM

## 2021-07-26 DIAGNOSIS — J45.41 MODERATE PERSISTENT ASTHMA WITH EXACERBATION: ICD-10-CM

## 2021-07-26 DIAGNOSIS — I10 ESSENTIAL HYPERTENSION: ICD-10-CM

## 2021-07-26 DIAGNOSIS — A53.0 POSITIVE RPR TEST: Primary | ICD-10-CM

## 2021-07-26 DIAGNOSIS — G47.00 INSOMNIA, UNSPECIFIED TYPE: ICD-10-CM

## 2021-07-26 DIAGNOSIS — J30.89 SEASONAL ALLERGIC RHINITIS DUE TO OTHER ALLERGIC TRIGGER: ICD-10-CM

## 2021-07-26 DIAGNOSIS — H10.13 ALLERGIC CONJUNCTIVITIS, BILATERAL: ICD-10-CM

## 2021-07-26 PROCEDURE — 99215 OFFICE O/P EST HI 40 MIN: CPT | Performed by: NURSE PRACTITIONER

## 2021-07-26 RX ORDER — LISINOPRIL 10 MG/1
10 TABLET ORAL DAILY
Qty: 90 TABLET | Refills: 1 | Status: SHIPPED | OUTPATIENT
Start: 2021-07-26 | End: 2022-03-03

## 2021-07-26 RX ORDER — PREDNISONE 20 MG/1
40 TABLET ORAL DAILY
Qty: 10 TABLET | Refills: 0 | Status: SHIPPED | OUTPATIENT
Start: 2021-07-26 | End: 2021-07-31

## 2021-07-26 RX ORDER — ALBUTEROL SULFATE 90 UG/1
1-2 AEROSOL, METERED RESPIRATORY (INHALATION) EVERY 4 HOURS PRN
Qty: 18 G | Refills: 11 | Status: SHIPPED | OUTPATIENT
Start: 2021-07-26 | End: 2022-08-10

## 2021-07-26 RX ORDER — MONTELUKAST SODIUM 10 MG/1
10 TABLET ORAL AT BEDTIME
Qty: 90 TABLET | Refills: 11 | Status: SHIPPED | OUTPATIENT
Start: 2021-07-26 | End: 2021-11-02

## 2021-07-26 NOTE — PATIENT INSTRUCTIONS
Patient Education   Asthma Medicines  Controllers and relievers  Controller medicines   Medicines that help control asthma and prevent symptoms are called controllers. They work over time--they will not relieve symptoms quickly. Some people take more than one controller.   Names of the controllers you take:   Flovent two puffs two times a day    How do they work?  Controllers help prevent asthma attacks. They take down swelling, relax airway muscles and reduce mucus over time. This keeps your airways open. They also block your body's response to asthma triggers (things that cause asthma symptoms).  How do I take them?  Some controllers are taken by mouth. Others are breathed in through an inhaler.  Your doctor will tell you how to take your medicine. Be sure to take it every day, at the same times each day.  Reliever medicines   Medicines that relieve an asthma flare-up quickly are called relievers. Some people take more than one reliever.   Names of the relievers you use:   Albuterol/Ventolin every 4 hours as needed for wheezing, shortness of breath, or coughing  ________________________________________  How do they work?  Relievers relax the muscles around the airways to open them up and make breathing easier. They bring fast relief from asthma symptoms.  How do I take them?  Relievers are breathed in through an inhaler or a nebulizer. Take them at the first sign of an asthma flare-up.  For informational purposes only. Not to replace the advice of your health care provider.   Copyright   2006 [facility]. All rights reserved. [employer] 653323 - REV 2/17.       Patient Education   Living with Asthma  What is asthma?   Asthma is a disease that affects breathing. There is no cure for asthma, but you will learn to control it.   The exact cause of asthma is not known. It appears to be more common in people who have allergies. It seems to run in families.  If you have asthma, you will always have some  "swelling in the lining of your airways. The muscles around the airways can tighten, making the airways smaller. The airways also produce more mucus, which makes them more likely to clog.  Normal, open airway Restricted airway       Asthma may occur at any age. Most children with asthma have their first symptoms before age 5. About half appear to \"outgrow\" their asthma, having fewer symptoms in their teen years. For some of these children, their asthma will return later in life.  What are the symptoms?  Symptoms of asthma include:     Coughing, especially at night or for several weeks after a cold, flu or other infection    Wheezing    Breathing faster than normal when at rest    Tightness or pain in the chest    Trouble breathing    Often feeling tired or out of breath    Trouble sleeping  In young children, it can be hard to tell if symptoms are from asthma or from another illness that causes coughing or wheezing.  What are some things that might trigger an asthma attack?  Asthma triggers are the things that cause asthma symptoms or make them worse. Triggers are different for each person.  Put a check next to the triggers that make asthma worse for you or your child. Then, read about how to control these triggers.    Smoking: Avoid second-hand smoke, incense and smoke from a fireplace or campfire. Use a HEPA air purifier in your bedroom. If anyone in your home smokes, now is the time to quit. For help quitting call 9-521-376-TIQG or go to www.Ligandal/700510.pdf.    Cold air: Cover the nose and mouth with a scarf.    Mold: Keep the humidity in your home between 30 and 50 percent. (You may need to buy a dehumidifier.) Fix leaky faucets, pipes and other water problems. Clean moldy surfaces with a  that contains bleach.    Pet hair or dander: Keep furred and feathered pets out of your home if you can. Be sure to keep them away from sleeping areas. Remove carpets and cloth-covered furniture. Use a HEPA air " "purifier in the bedroom.    Colds and flu: Hand-washing is the best way to prevent colds and other illnesses. You might also use hand . Be sure to get a yearly flu shot as well.    Strong odors: Use only unscented products (shampoo, soap, lotion, toilet paper, , etc.). Avoid perfume, cologne, scented aftershave, hair spray and air fresheners.    Dust and dust mites: Every week, wash bedding, blankets and stuffed animals in hot water. Remove carpets from the bedroom. Vacuum carpet, rugs and hardwood once or twice a week. (Be sure your vacuum  has a HEPA filter. Ideally, those with asthma should stay away for a while after rooms have been vacuumed.) Put pillows and mattresses inside a dust-proof cover. Keep the humidity in your home between 30 and 50 percent. (You may need to buy a dehumidifier.)    Cockroaches: Always keep food and garbage in closed containers. Use poison baits, powders, gels or paste (for example, boric acid). You can also use traps. If you use conley spray, those with asthma should stay away until the odor is gone.    Certain medicines: Tell your doctor about all the medicines you take. Include aspirin, cold medicines, vitamins and eye drops.    Pollen: Keep windows closed when there's a lot of pollen in the air. An air conditioner will help, too. Try to stay indoors from late morning to afternoon, when pollen is at its worst. Ask your doctor if you need to take or increase medicine before allergy season starts.    Exhaust from cars and buses: Try to stay indoors when air quality is poor. (For current quality levels, go to www.airnow.gov.) If you live near a street, keep windows closed.  Adapted from \"Asthma Action Plan,\"   NIH Publication No. .  What should I do during an asthma attack?  At the first sign of an attack, follow your asthma action plan. This is the plan you received from your care team. It will tell you exactly what to do. Your action plan may " "include:    Medicine to improve breathing.    Calling a doctor or 911.  Call 911 if:    Breathing is very hard or fast.    The person cannot talk or walk.    You see \"sucking in\" between the ribs (called retractions).    The lips are turning blue.  How can I prevent an asthma attack?  As you learn to control asthma, life starts to feel normal again. You or your child can go back to regular activities, including sports. You have more energy for work, play, school and family. There will be fewer missed days at work or school. And, everyone in your home will sleep better at night.  But safety is the most important reason to control asthma. When asthma is well controlled, there is a much lower risk of dying from an asthma attack.   To control asthma:    Take your medicine every day, even when you feel well. This helps keep the airways open. For more about medicines, see page 4.    Avoid or control the things that cause an asthma attack.    See the doctor regularly, not just after an attack.    Get to know the earliest signs of an asthma attack. If an attack occurs, follow your action plan right away.    If your child has asthma, share their action plan with all caregivers. (This includes babysitters,  providers, teachers and school nurses.) Everyone who cares for your child should know what to do during an asthma attack. By a certain age, your child should learn what to do as well.  Asthma is under control when you:     Need an inhaler less than three times a week (except for exercise).    Wake up at night with symptoms fewer than twice a month.  To bring asthma under control, you will need to work closely with your care team. It may take more than one try to find a plan that works for you.  Is it safe to exercise?  Yes--as long as asthma is well controlled.   People with asthma can play any sport and get any amount of exercise. This is true even if exercise has caused an asthma attack in the past. In fact, " athletes with asthma regularly compete at the highest levels.   We all need exercise to stay healthy and strong.   If asthma is well controlled, you or your child can exercise safely. Follow these safety tips.    Ask your doctor if you need medicine before exercise. Take all medicines as directed.    Warm up before exercise. Try 5 to 10 minutes of walking or light stretching.    Don't exercise outdoors when asthma triggers are present (pollen, air pollution, cold air, etc.).    If your child as asthma, talk to your child's teachers or coaches. Be sure they know the signs of an asthma attack and how to treat it.  Is it safe to travel?   You can travel safely with asthma, but you need to plan ahead.    Carry extra medicine with you. Never keep your medicine in a checked bag.    Set aside a few days' worth of medicine in another place--in a handbag or with a travel partner--in case the items you carry are lost or stolen.    Tell the people you will travel with that you have asthma. Be sure they know the signs of an asthma attack and what to do if you need help.    Find out how and where to get medical care where you will be traveling.    Pack wisely. You will want to bring:  ? Asthma medicine (both relievers and controllers)  ? Written prescriptions, in case you lose your medicine  ? A spacer or chamber  ? A nebulizer or MDI  ? Copies of your asthma action plan  ? Your doctor's name, phone number and fax number  ? Your own address and phone number, as well as an emergency contact  ? Medical ID (bracelet, card or necklace) that states you have asthma  ? Your insurance card  Asthma in children and teens  As soon as they're old enough, children should learn to manage their asthma and make healthy choices. Ask the doctor to help your child set specific goals for self-care. Then, follow up on these at each doctor's visit. Your child's goals will change as your child grows.  Many children do a great job controlling their  "asthma. But when they enter their teen years, some children begin to have trouble. They don't like feeling \"different\" from their friends, so they may stop taking their medicines. They might worry about having an asthma attack in public. Some begin to smoke, or they spend time with friends who smoke.  When children and teens can't manage their care well, parents and other caregivers need to take charge. Check in each day to be sure your children are taking their medicines.  Asthma medicine  Remember, never leave asthma medicine within the reach of young children. A responsible adult should help your child take their medicine until the child is able to do it alone. After that, your child should carry an inhaler at all times. Do not leave the inhaler with the school nurse.  Children as young as three can learn to use an inhaler with a spacer. Those who have trouble may find a nebulizer helpful. (A nebulizer is a small machine that converts asthma medicine into a mist. The child breathes the mist in through a mask.)   Sometimes, an inhaler won't help during an asthma attack--your child may not be able to breathe in enough air to get the medicine into the lungs. If you think this may be happening, call 911 right away.  At school or   All of your child's teachers and coaches should know that your child has asthma.  At the start of each school year, try to meet with your child's teachers, coaches, principal and the school nurse. Be sure to give each person a copy of your child's asthma action plan. You should also:    Explain what do to if your child has an asthma attack.    Discuss your child's triggers and warning signs.    Show them how to use your child's medicines.    Leave important phone numbers (parents, doctor) in case your child has trouble breathing.  A note to teens  Asthma can be a special challenge in the teen years. Even if you've easily managed your asthma in the past, you may start to have more " "problems with symptoms. This could be due to hormonal changes, or it could result from changes in your lifestyle. Follow these tips:    Keep taking your medicines as directed. If you don't want to take controller medicine in front of your friends, take it in the morning or at night. You won't notice the effects of this medicine, but if you stop taking it, it could be harmful--even fatal.    Don't smoke, not even a little bit. Smoking and second-hand smoke can cause sudden, severe asthma attacks. If you smoke or spend time with smokers, it will be much harder to control your asthma. Smokers 14 to 19 years old can get help quitting from the American Lung Association's \"Not On Tobacco\" program. You can also get free phone counseling through the Lung Helpline (2-996-LUNG-[facility]).    Stay active. You don't need to give up sports and other activities. Just be sure that your coaches each have a copy of your asthma action plan.    If you have concerns, talk to your family or doctor. Maybe you're nervous about having an asthma attack in public. Or maybe there's an asthma trigger at a friend's house and you're not sure what do to. Remember, you're not in this alone. Your support network is ready to help brainstorm solutions, so keep the lines of communication open.  For patients  To help control my asthma:    I will work with my care team.    I will follow my asthma action plan.    I will take all of my medicines.    I will know about my asthma triggers and how to avoid them.    I will know the early signs of an asthma attack.    I will know how to tell when my asthma is getting worse.    I will know when to call my doctor or 911.    If I am a smoker, I will quit smoking.  When my asthma is under control, I:    Need an inhaler less than three times a week (except for exercise)    Sleep through the night better    Can run and exercise    Miss fewer days of work    Other: __________________________  For parents  To help control my " "child's asthma:    I will work with my child's care team.    I will help my child follow their asthma action plan.    I will share the action plan with anyone who cares for my child.    I will make sure my child takes all of their medicines.    I will help my child avoid their asthma triggers.    I will know the early signs of an asthma attack.    I will know how to tell when my child's asthma is getting worse.    I will know when to call the doctor or 911.    If I am a smoker, I will quit smoking.  When my child's asthma is under control, they:    Need an inhaler less than three times a week (except for exercise)    Sleep through the night better    Can run and play    Miss fewer days at school    Other: __________________________  Resources  Air Now (for current information about air quality)  www.airnow.gov  American Lung Association in [place]  779.720.8635  www.lung.org/about-us/local-associations/[place].html  Centers for Disease Control and Prevention  993.125.3941  www.cdc.gov/asthma  [facility]  www.[facility].org/stephen/health/asthma&#047;  [facility]  kidshealth.org/parent/centers/asthma_center.html  [facility]  899.243.4965  www.health.[facility].[place].us  (search \"asthma\")  Preventing Asthma Attacks in Children  www.noattacks.org  United [place] Environmental Protection Agency  www.epa.gov/asthma  For informational purposes only. Not to replace the advice of your health care provider.   Copyright   2014 [employer]. All rights reserved. 857378 - REV 02/20.       "

## 2021-07-26 NOTE — PROGRESS NOTES
Assessment & Plan       ICD-10-CM    1. Positive RPR test  A53.0 Treponema Abs w Reflex to RPR and Titer   2. Moderate persistent asthma with exacerbation  J45.41 predniSONE (DELTASONE) 20 MG tablet   3. Mild persistent reactive airway disease without complication  J45.30 albuterol (PROAIR HFA/PROVENTIL HFA/VENTOLIN HFA) 108 (90 Base) MCG/ACT inhaler     montelukast (SINGULAIR) 10 MG tablet   4. Essential hypertension  I10 lisinopril (ZESTRIL) 10 MG tablet     **Basic metabolic panel FUTURE 14d   5. Insomnia, unspecified type  G47.00 melatonin 5 MG tablet   6. Seasonal allergic rhinitis due to other allergic trigger  J30.89 montelukast (SINGULAIR) 10 MG tablet   7. Allergic conjunctivitis, bilateral  H10.13 ketotifen (ZADITOR) 0.025 % ophthalmic solution    long visit today for lab results follow up and chronic conditions    Pt also having sob and wheezing with exacerbation of asthma discussed ER in off hours, push fluids and monitor this closely    Discussed meds in detail, importance of continuing flovent twice a day and albuterol at least twice a day for now until starts to feel better. O2 okay on RA, although BP and HR up today and coughing productive clear secretions  Instructed to do both inhalers immediately after leaving today and pt verbalized understanding with  on phone   Consider CXR and/or antibiotics if not improving with steroid course  Needs to get BP controlled, could not recheck due to pt coughing so much    Stop labetolol and start lisinopril, may need additional agent recheck BP tomorrow to see if came down with this, can do nurse only visit if needed    Start melatonin at night for sleep, consider hydroxyzine rather than zyrtec will hold off on making too many changes at once  Consider reassessing anxiety vs insomnia     Also requested eye drops for allergic symptoms, smoke and hot weather     Review of prior external note(s) from - CareEverywhere information from [facility] and  "[facility]  reviewed  Review of the result(s) of each unique test - from last visit  Diagnosis or treatment significantly limited by social determinants of health - see note  Ordering of each unique test  Prescription drug management  67 minutes spent on the date of the encounter doing chart review, history and exam, documentation and further activities per the note       BMI:   Estimated body mass index is 26.15 kg/m  as calculated from the following:    Height as of 7/12/21: 1.676 m (5' 6\").    Weight as of 7/12/21: 73.5 kg (162 lb).   Weight management plan: Discussed healthy diet and exercise guidelines        Return in about 1 week (around 8/2/2021) for for follow up visit, earlier if any concerns.   Nurse only BP pulse and O2 sat in 1 day    MARIELLE Suazo CNP [facility]    Shaila Lo is a 36 year old who presents for the following health issues  accompanied by her  and phone:    HPI     Patient is here today to discuss lab results. Patient also has paperwork that needs to be signed.     Positive rpr not reactive confirm    TSH has been up in the past, has been on replacement but ran out at times when time between refills    [place] was thyroid issue before that wasn't aware 10 years ago    Called in an order for med on 7/12 she hasn't started yet    HTN - BP up today, sick and coughing and only taking med once at night     Asthma gets bad from weather and smoke   Has inhaler here albuterol  Didn't do flovent after found it didn't help that day  Used to take little pill that helped with mucous, singulair noted on care everywhere  Also started zyrtec but hasn't helped yet     couging whz throught phlegm  No fever but BP up, did not take her med this am      Review of Systems   Constitutional, HEENT, cardiovascular, pulmonary, GI, , musculoskeletal, neuro, skin, endocrine and psych systems are negative, except as otherwise noted.    "   Objective    BP (!) 180/88   Pulse 113   Temp (!) 96  F (35.6  C) (Temporal)   SpO2 97%   There is no height or weight on file to calculate BMI.  Physical Exam   GENERAL: healthy, alert and no distress  EYES: PERRL and conjunctiva/corneas- conjunctival injection bilaterally with clear watery discharge   HENT: nasal mucosa edematous , rhinorrhea clear and white, oropharynx clear and oral mucous membranes moist  NECK: no adenopathy, no asymmetry, masses, or scars and thyroid normal to palpation  RESP: expiratory wheezes throughout  CV: regular rate and rhythm, normal S1 S2, no S3 or S4, no murmur, click or rub, no peripheral edema and peripheral pulses strong  ABDOMEN: soft, nontender, no hepatosplenomegaly, no masses and bowel sounds normal  MS: no gross musculoskeletal defects noted, no edema  NEURO: Normal strength and tone, mentation intact and speech normal  PSYCH: mentation appears normal, affect normal/bright

## 2021-07-26 NOTE — Clinical Note
Please call to help patient schedule a follow up in person with me within a week to recheck BP and asthma, I should have forms finished by then as well. Can do nurse only appointment tomorrow to recheck BP and pulse, O2 sat would be a good idea if there's an opening to do this for her, (she was coughing too hard today to recheck it)  Thanks! Mela

## 2021-07-27 ENCOUNTER — TELEPHONE (OUTPATIENT)
Dept: FAMILY MEDICINE | Facility: CLINIC | Age: 36
End: 2021-07-27
Payer: COMMERCIAL

## 2021-07-27 NOTE — TELEPHONE ENCOUNTER
Reason for Call:  Form, our goal is to have forms completed with 72 hours, however, some forms may require a visit or additional information.    Type of letter, form or note:  [facility]     Who is the form from?: Patient    Where did the form come from: Patient or family brought in       What clinic location was the form placed at?: [place]    Where the form was placed: Given to physician    What number is listed as a contact on the form?:        Additional comments:     Call taken on 7/27/2021 at 8:06 AM by Monik Alves MA

## 2021-07-27 NOTE — TELEPHONE ENCOUNTER
Forms are ready and waiting at the  to be picked up. Patient has been notified of this.           [facility]       Mela Hagen      [facility]    [address] [address]    [email or web address]  Office: [phone]  Cell or pager: [phone]

## 2021-08-11 ENCOUNTER — OFFICE VISIT (OUTPATIENT)
Dept: FAMILY MEDICINE | Facility: CLINIC | Age: 36
End: 2021-08-11
Payer: COMMERCIAL

## 2021-08-11 VITALS
TEMPERATURE: 97 F | HEART RATE: 76 BPM | SYSTOLIC BLOOD PRESSURE: 130 MMHG | DIASTOLIC BLOOD PRESSURE: 70 MMHG | OXYGEN SATURATION: 99 %

## 2021-08-11 DIAGNOSIS — E03.9 ACQUIRED HYPOTHYROIDISM: Primary | ICD-10-CM

## 2021-08-11 DIAGNOSIS — J45.30 MILD PERSISTENT REACTIVE AIRWAY DISEASE WITHOUT COMPLICATION: ICD-10-CM

## 2021-08-11 DIAGNOSIS — I10 ESSENTIAL HYPERTENSION: ICD-10-CM

## 2021-08-11 DIAGNOSIS — Z91.89 AT HIGH RISK FOR ADVERSE MEDICATION EVENT: ICD-10-CM

## 2021-08-11 DIAGNOSIS — A53.0 POSITIVE RPR TEST: ICD-10-CM

## 2021-08-11 PROBLEM — J45.41 MODERATE PERSISTENT ASTHMA WITH EXACERBATION: Status: RESOLVED | Noted: 2021-07-26 | Resolved: 2021-08-11

## 2021-08-11 PROCEDURE — 99214 OFFICE O/P EST MOD 30 MIN: CPT | Performed by: NURSE PRACTITIONER

## 2021-08-11 NOTE — PROGRESS NOTES
Assessment & Plan       ICD-10-CM    1. Acquired hypothyroidism  E03.9    2. Essential hypertension  I10    3. Mild persistent reactive airway disease without complication  J45.30    4. Positive RPR test  A53.0    5. At high risk for adverse medication event  Z91.89       Discussed Syphillis and likely tertiary or treated infection--pt thinks latter but no records for me to view. She is wheelchair  Bound may be from this she doesn't know a lot about her history as a child    Will recheck lab work and follow up as indicated--already ordered    Pt confused about medications, needs MTM referral for complete med rec, reports taking thyroid medication and is actually metoprolol today, she is not sure if she is taking lisinopril   Discussed risks of not taking medications as ordered, she might have doubled her thyroid or anit-hypertension med and doesn't have all meds with her here today. Recently established with me and restarted medications as best we could during our first visit    Also unclear whether she is taking asthma controller or reliever only, zyrtec and flonase, and she does wish to start singulair  Working on allergen friendly home         There are no Patient Instructions on file for this visit.    Return in about 4 weeks (around 9/8/2021) for for follow up visit, earlier if any concerns.   See MTM asap or RN to do med rec if unavailable     MARIELLE Suazo CNP  [facility]    Shaila Lo is a 36 year old who presents for the following health issues  accompanied by her  and via phone:    HPI     Patient is here for a follow up appointment. Was told to return to clinic once finished with medication for a follow up.     Prednisone helped her cough, her aches are better  Inhaler used only once in the past month    States she was treated for Syphillis in the past in [place] and finished entire course-almost 30 years ago as a child    Asthma Follow-Up    Was  ACT completed today?    Yes    ACT Total Scores 3/22/2021   ACT TOTAL SCORE (Goal Greater than or Equal to 20) 25   In the past 12 months, how many times did you visit the emergency room for your asthma without being admitted to the hospital? 0   In the past 12 months, how many times were you hospitalized overnight because of your asthma? 0          How many days per week do you miss taking your asthma controller medication?  0 I think she only does when upper respiratory infection? Attempted to clarify with      Please describe any recent triggers for your asthma: smoke, humidity and strong odors and fumes    Have you had any Emergency Room Visits, Urgent Care Visits, or Hospital Admissions since your last office visit?  No    Hypothyroidism Follow-up      Since last visit, patient describes the following symptoms: Weight stable, no hair loss, no skin changes, no constipation, no loose stools      How many servings of fruits and vegetables do you eat daily?  2-3    On average, how many sweetened beverages do you drink each day (Examples: soda, juice, sweet tea, etc.  Do NOT count diet or artificially sweetened beverages)?   1    How many days per week do you exercise enough to make your heart beat faster? 3 or less    How many minutes a day do you exercise enough to make your heart beat faster? 10 - 19    How many days per week do you miss taking your medication? 0    Has had a stomach ache from the higher dose of med--she had thought it was thyroid med but has it with and is BP med lisinopril  She stopped this and took her previous dose of thryoid medication ?  Or perhaps labetolol? She doesn't have with her today  On chart review I ordered lisinopril and appears she was not on this previously  Established care last visit and had run out of meds at that time    Used to take metoprolol and labetolol and she thinks one of these is what she took this morning    helping to clarify, she admits she  thinks she got her medications confused  HTN is at goal today and pt feels well  As long as not taking double the thyroid dose, continue what she is doing now for HTN medications    Review of Systems   Constitutional, HEENT, cardiovascular, pulmonary, GI, , musculoskeletal, neuro, skin, endocrine and psych systems are negative, except as otherwise noted.      Objective    /70   Pulse 76   Temp 97  F (36.1  C) (Temporal)   SpO2 99%   There is no height or weight on file to calculate BMI.     Physical Exam   GENERAL: healthy, alert and no distress  EYES: Eyes grossly normal to inspection, PERRL and conjunctivae and sclerae normal  NECK: no adenopathy, no asymmetry, masses, or scars and thyroid normal to palpation  RESP: lungs clear to auscultation - no rales, rhonchi or wheezes  CV: regular rate and rhythm, normal S1 S2, no S3 or S4, no murmur, click or rub, no peripheral edema and peripheral pulses strong  PSYCH: mentation appears normal, affect normal/bright    Lab on 07/12/2021   Component Date Value Ref Range Status     Vitamin D, Total (25-Hydroxy) 07/12/2021 30  20 - 75 ug/L Final     Sodium 07/12/2021 137  133 - 144 mmol/L Final     Potassium 07/12/2021 4.3  3.4 - 5.3 mmol/L Final    Specimen slightly hemolyzed, potassium may be falsely elevated.     Chloride 07/12/2021 104  mmol/L Final     Carbon Dioxide (CO2) 07/12/2021 22  20 - 32 mmol/L Final     Anion Gap 07/12/2021 11  3 - 14 mmol/L Final     Urea Nitrogen 07/12/2021 8  7 - 30 mg/dL Final     Creatinine 07/12/2021 0.55  mg/dL Final     Calcium 07/12/2021 9.2  8.5 - 10.1 mg/dL Final     Glucose 07/12/2021 71  70 - 99 mg/dL Final     Alkaline Phosphatase 07/12/2021 108  40 - 150 U/L Final     AST 07/12/2021 36  0 - 45 U/L Final    Specimen is hemolyzed which can falsely elevate AST. Analysis of a non-hemolyzed specimen may result in a lower value.     ALT 07/12/2021 24  U/L Final     Protein Total 07/12/2021 7.8  6.8 - 8.8 g/dL Final     Albumin  07/12/2021 3.8  3.4 - 5.0 g/dL Final     Bilirubin Total 07/12/2021 0.2  0.2 - 1.3 mg/dL Final     GFR Estimate 07/12/2021 >90  >60 mL/min/1.73m2 Final    As of July 11, 2021, eGFR is calculated by the CKD-EPI creatinine equation, without race adjustment. eGFR can be influenced by muscle mass, exercise, and diet. The reported eGFR is an estimation only and is only applicable if the renal function is stable.     Treponema Antibody Total 07/12/2021 Reactive* Nonreactive Final    The Anti-Treponema Antibody screening tends to remain positive for life, therefore it does not distinguish between active and past syphilis infections. All positive and equivocal results will automatically reflex to a non-specific Rapid Plasma Reagin (RPR) test.    If the Treponema Antibody is positive, and the RPR is positive, this is presumptive evidence of current infection, inadequately treated infection, persistent infection or reinfection.    If the Anti-Treponema Antibody is positive and the RPR is negative, then a second Treponema specific test (TP-PA) will be performed to determine whether the antibody test is falsely positive or is detecting early in fection.  If the latter is suspected, repeat testing in approximately two weeks is recommended.     HIV Antigen Antibody Combo 07/12/2021 Nonreactive  Nonreactive Final    HIV-1 p24 Ag & HIV-1/HIV-2 Ab Not Detected     TSH 07/12/2021 11.46* 0.40 - 4.00 mU/L Final     WBC Count 07/12/2021 8.2  4.0 - 11.0 10e3/uL Final     RBC Count 07/12/2021 4.48  3.80 - 5.20 10e6/uL Final     Hemoglobin 07/12/2021 12.4  11.7 - 15.7 g/dL Final     Hematocrit 07/12/2021 38.5  35.0 - 47.0 % Final     MCV 07/12/2021 86  78 - 100 fL Final     MCH 07/12/2021 27.7  26.5 - 33.0 pg Final     MCHC 07/12/2021 32.2  31.5 - 36.5 g/dL Final     RDW 07/12/2021 16.1* 10.0 - 15.0 % Final     Platelet Count 07/12/2021 331  150 - 450 10e3/uL Final     % Neutrophils 07/12/2021 52  % Final     % Lymphocytes 07/12/2021 32  %  Final     % Monocytes 07/12/2021 8  % Final     % Eosinophils 07/12/2021 8  % Final     % Basophils 07/12/2021 1  % Final     Absolute Neutrophils 07/12/2021 4.3  1.6 - 8.3 10e3/uL Final     Absolute Lymphocytes 07/12/2021 2.6  0.8 - 5.3 10e3/uL Final     Absolute Monocytes 07/12/2021 0.6  0.0 - 1.3 10e3/uL Final     Absolute Eosinophils 07/12/2021 0.6  0.0 - 0.7 10e3/uL Final     Absolute Basophils 07/12/2021 0.0  0.0 - 0.2 10e3/uL Final     Free T4 07/12/2021 0.93  0.76 - 1.46 ng/dL Final     Rapid Plasma Reagin 07/12/2021 Reactive* Nonreactive Final     Rapid Plasma Reagin Titer 07/12/2021 1:1  Non Reactive Final

## 2021-08-13 ENCOUNTER — TELEPHONE (OUTPATIENT)
Dept: FAMILY MEDICINE | Facility: CLINIC | Age: 36
End: 2021-08-13

## 2021-08-13 NOTE — TELEPHONE ENCOUNTER
MTM referral from: [facility] visit (referral by provider)    MTM referral outreach attempt #2 on August 13, 2021 at 11:59 AM      Outcome: Patient not reachable after several attempts, will route to MTM Pharmacist/Provider as an FYI. Thank you for the referral.    Erin Guerra, MTM Coordinator

## 2021-09-07 ENCOUNTER — HOSPITAL ENCOUNTER (OUTPATIENT)
Dept: PHYSICAL THERAPY | Facility: CLINIC | Age: 36
Setting detail: THERAPIES SERIES
End: 2021-09-07
Payer: COMMERCIAL

## 2021-09-07 PROCEDURE — 97542 WHEELCHAIR MNGMENT TRAINING: CPT | Mod: GP | Performed by: PHYSICAL THERAPIST
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
## 2021-09-14 ENCOUNTER — TRANSFERRED RECORDS (OUTPATIENT)
Dept: HEALTH INFORMATION MANAGEMENT | Facility: CLINIC | Age: 36
End: 2021-09-14

## 2021-09-14 PROBLEM — Z91.89: Status: ACTIVE | Noted: 2021-09-14

## 2021-09-17 DIAGNOSIS — E55.9 VITAMIN D DEFICIENCY: ICD-10-CM

## 2021-09-17 NOTE — TELEPHONE ENCOUNTER
Routing refill request to provider for review/approval because:  Routing to PCP    Edmund Jin RN  [facility] Triage Nurse

## 2021-09-21 RX ORDER — CHOLECALCIFEROL (VITAMIN D3) 50 MCG
TABLET ORAL
Qty: 90 TABLET | Refills: 11 | Status: SHIPPED | OUTPATIENT
Start: 2021-09-21

## 2021-09-29 ENCOUNTER — APPOINTMENT (OUTPATIENT)
Dept: LAB | Facility: CLINIC | Age: 36
End: 2021-09-29
Payer: COMMERCIAL

## 2021-09-29 ENCOUNTER — OFFICE VISIT (OUTPATIENT)
Dept: FAMILY MEDICINE | Facility: CLINIC | Age: 36
End: 2021-09-29
Payer: COMMERCIAL

## 2021-09-29 VITALS
DIASTOLIC BLOOD PRESSURE: 78 MMHG | TEMPERATURE: 97.4 F | BODY MASS INDEX: 25.55 KG/M2 | WEIGHT: 159 LBS | HEART RATE: 76 BPM | OXYGEN SATURATION: 99 % | SYSTOLIC BLOOD PRESSURE: 124 MMHG | HEIGHT: 66 IN

## 2021-09-29 DIAGNOSIS — R79.89 ELEVATED TSH: ICD-10-CM

## 2021-09-29 DIAGNOSIS — Z11.3 ROUTINE SCREENING FOR STI (SEXUALLY TRANSMITTED INFECTION): Primary | ICD-10-CM

## 2021-09-29 DIAGNOSIS — G82.20 PARAPLEGIA (H): ICD-10-CM

## 2021-09-29 DIAGNOSIS — E55.9 VITAMIN D DEFICIENCY: ICD-10-CM

## 2021-09-29 PROCEDURE — 80053 COMPREHEN METABOLIC PANEL: CPT | Performed by: NURSE PRACTITIONER

## 2021-09-29 PROCEDURE — 82306 VITAMIN D 25 HYDROXY: CPT | Performed by: NURSE PRACTITIONER

## 2021-09-29 PROCEDURE — 36415 COLL VENOUS BLD VENIPUNCTURE: CPT | Performed by: NURSE PRACTITIONER

## 2021-09-29 PROCEDURE — 84443 ASSAY THYROID STIM HORMONE: CPT | Performed by: NURSE PRACTITIONER

## 2021-09-29 PROCEDURE — 86780 TREPONEMA PALLIDUM: CPT | Performed by: NURSE PRACTITIONER

## 2021-09-29 PROCEDURE — 86592 SYPHILIS TEST NON-TREP QUAL: CPT | Performed by: NURSE PRACTITIONER

## 2021-09-29 PROCEDURE — 86593 SYPHILIS TEST NON-TREP QUANT: CPT | Performed by: NURSE PRACTITIONER

## 2021-09-29 PROCEDURE — 99213 OFFICE O/P EST LOW 20 MIN: CPT | Performed by: NURSE PRACTITIONER

## 2021-09-29 PROCEDURE — 87389 HIV-1 AG W/HIV-1&-2 AB AG IA: CPT | Performed by: NURSE PRACTITIONER

## 2021-09-29 ASSESSMENT — MIFFLIN-ST. JEOR: SCORE: 1417.97

## 2021-09-29 NOTE — PROGRESS NOTES
Assessment & Plan   Problem List Items Addressed This Visit     Vitamin D deficiency    Relevant Orders    **Vitamin D Deficiency FUTURE 2mo    Paraplegia (H)    Relevant Orders    Occupational Therapy Referral      Other Visit Diagnoses     Routine screening for STI (sexually transmitted infection)    -  Primary    Relevant Orders    Treponema Abs w Reflex to RPR and Titer    HIV Antigen Antibody Combo    Elevated TSH        Relevant Orders    TSH with free T4 reflex    Comprehensive metabolic panel (BMP + Alb, Alk Phos, ALT, AST, Total. Bili, TP)         -REfered to [facility] for Driving Evaluation and [facility] for assistive Technology  Recheck Thyroid today  Confirmatory RPR negative for Syphilis infection, she asked that it be re-checked today.    25 minutes spent on the date of the encounter doing chart review, history and exam, documentation and further activities per the note       See Patient Instructions    No follow-ups on file.    MARIELLE Page CNP  [facility] HospitalJULIETH Lo is a 36 year old who presents for the following health issues  HPI     Hypothyroidism Follow-up      Since last visit, patient describes the following symptoms: Weight stable, no hair loss, no skin changes, no constipation, no loose stools      How many servings of fruits and vegetables do you eat daily?  2-3    On average, how many sweetened beverages do you drink each day (Examples: soda, juice, sweet tea, etc.  Do NOT count diet or artificially sweetened beverages)?   1    How many days per week do you exercise enough to make your heart beat faster? 3 or less    How many minutes a day do you exercise enough to make your heart beat faster? 9 or less    How many days per week do you miss taking your medication? 0    Paraplegia- Would like to start taking Driving lessons. Needs a note from Doctor stating that she is able to drive. She is able to use both hands well; limited use of  "legs.      Review of Systems   Constitutional, HEENT, cardiovascular, pulmonary, gi and gu systems are negative, except as otherwise noted.      Objective    /78   Pulse 76   Temp 97.4  F (36.3  C) (Temporal)   Ht 1.676 m (5' 6\")   Wt 72.1 kg (159 lb)   SpO2 99%   BMI 25.66 kg/m    Body mass index is 25.66 kg/m .  Physical Exam   GENERAL: healthy, alert and no distress  NECK: no adenopathy, no asymmetry, masses, or scars and thyroid normal to palpation  RESP: lungs clear to auscultation - no rales, rhonchi or wheezes  CV: regular rate and rhythm, normal S1 S2, no S3 or S4, no murmur, click or rub, no peripheral edema and peripheral pulses strong  ABDOMEN: soft, nontender, no hepatosplenomegaly, no masses and bowel sounds normal    Lab on 07/12/2021   Component Date Value Ref Range Status     Vitamin D, Total (25-Hydroxy) 07/12/2021 30  20 - 75 ug/L Final     Sodium 07/12/2021 137  133 - 144 mmol/L Final     Potassium 07/12/2021 4.3  3.4 - 5.3 mmol/L Final    Specimen slightly hemolyzed, potassium may be falsely elevated.     Chloride 07/12/2021 104  mmol/L Final     Carbon Dioxide (CO2) 07/12/2021 22  20 - 32 mmol/L Final     Anion Gap 07/12/2021 11  3 - 14 mmol/L Final     Urea Nitrogen 07/12/2021 8  7 - 30 mg/dL Final     Creatinine 07/12/2021 0.55  mg/dL Final     Calcium 07/12/2021 9.2  8.5 - 10.1 mg/dL Final     Glucose 07/12/2021 71  70 - 99 mg/dL Final     Alkaline Phosphatase 07/12/2021 108  40 - 150 U/L Final     AST 07/12/2021 36  0 - 45 U/L Final    Specimen is hemolyzed which can falsely elevate AST. Analysis of a non-hemolyzed specimen may result in a lower value.     ALT 07/12/2021 24  U/L Final     Protein Total 07/12/2021 7.8  6.8 - 8.8 g/dL Final     Albumin 07/12/2021 3.8  3.4 - 5.0 g/dL Final     Bilirubin Total 07/12/2021 0.2  0.2 - 1.3 mg/dL Final     GFR Estimate 07/12/2021 >90  >60 mL/min/1.73m2 Final    As of July 11, 2021, eGFR is calculated by the CKD-EPI creatinine equation, " without race adjustment. eGFR can be influenced by muscle mass, exercise, and diet. The reported eGFR is an estimation only and is only applicable if the renal function is stable.     Treponema Antibody Total 07/12/2021 Reactive* Nonreactive Final    The Anti-Treponema Antibody screening tends to remain positive for life, therefore it does not distinguish between active and past syphilis infections. All positive and equivocal results will automatically reflex to a non-specific Rapid Plasma Reagin (RPR) test.    If the Treponema Antibody is positive, and the RPR is positive, this is presumptive evidence of current infection, inadequately treated infection, persistent infection or reinfection.    If the Anti-Treponema Antibody is positive and the RPR is negative, then a second Treponema specific test (TP-PA) will be performed to determine whether the antibody test is falsely positive or is detecting early in fection.  If the latter is suspected, repeat testing in approximately two weeks is recommended.     HIV Antigen Antibody Combo 07/12/2021 Nonreactive  Nonreactive Final    HIV-1 p24 Ag & HIV-1/HIV-2 Ab Not Detected     TSH 07/12/2021 11.46* 0.40 - 4.00 mU/L Final     WBC Count 07/12/2021 8.2  4.0 - 11.0 10e3/uL Final     RBC Count 07/12/2021 4.48  3.80 - 5.20 10e6/uL Final     Hemoglobin 07/12/2021 12.4  11.7 - 15.7 g/dL Final     Hematocrit 07/12/2021 38.5  35.0 - 47.0 % Final     MCV 07/12/2021 86  78 - 100 fL Final     MCH 07/12/2021 27.7  26.5 - 33.0 pg Final     MCHC 07/12/2021 32.2  31.5 - 36.5 g/dL Final     RDW 07/12/2021 16.1* 10.0 - 15.0 % Final     Platelet Count 07/12/2021 331  150 - 450 10e3/uL Final     % Neutrophils 07/12/2021 52  % Final     % Lymphocytes 07/12/2021 32  % Final     % Monocytes 07/12/2021 8  % Final     % Eosinophils 07/12/2021 8  % Final     % Basophils 07/12/2021 1  % Final     Absolute Neutrophils 07/12/2021 4.3  1.6 - 8.3 10e3/uL Final     Absolute Lymphocytes 07/12/2021 2.6   0.8 - 5.3 10e3/uL Final     Absolute Monocytes 07/12/2021 0.6  0.0 - 1.3 10e3/uL Final     Absolute Eosinophils 07/12/2021 0.6  0.0 - 0.7 10e3/uL Final     Absolute Basophils 07/12/2021 0.0  0.0 - 0.2 10e3/uL Final     Free T4 07/12/2021 0.93  0.76 - 1.46 ng/dL Final     Rapid Plasma Reagin 07/12/2021 Reactive* Nonreactive Final     Rapid Plasma Reagin Titer 07/12/2021 1:1  Non Reactive Final

## 2021-09-30 ENCOUNTER — OFFICE VISIT (OUTPATIENT)
Dept: PHYSICAL MEDICINE AND REHAB | Facility: CLINIC | Age: 36
End: 2021-09-30
Payer: COMMERCIAL

## 2021-09-30 ENCOUNTER — APPOINTMENT (OUTPATIENT)
Dept: INTERPRETER SERVICES | Facility: CLINIC | Age: 36
End: 2021-09-30
Payer: COMMERCIAL

## 2021-09-30 VITALS
DIASTOLIC BLOOD PRESSURE: 82 MMHG | OXYGEN SATURATION: 96 % | RESPIRATION RATE: 16 BRPM | SYSTOLIC BLOOD PRESSURE: 158 MMHG | WEIGHT: 164 LBS | TEMPERATURE: 98.5 F | BODY MASS INDEX: 26.47 KG/M2 | HEART RATE: 84 BPM

## 2021-09-30 DIAGNOSIS — G82.22 CHRONIC INCOMPLETE SPASTIC PARAPLEGIA (H): ICD-10-CM

## 2021-09-30 DIAGNOSIS — M62.838 SPASM OF MUSCLE: ICD-10-CM

## 2021-09-30 DIAGNOSIS — G82.22 INCOMPLETE PARAPLEGIA (H): ICD-10-CM

## 2021-09-30 DIAGNOSIS — M62.838 MUSCLE SPASTICITY: Primary | ICD-10-CM

## 2021-09-30 LAB
ALBUMIN SERPL-MCNC: 3.3 G/DL (ref 3.4–5)
ALP SERPL-CCNC: 107 U/L (ref 40–150)
ALT SERPL W P-5'-P-CCNC: 21 U/L (ref 0–50)
ANION GAP SERPL CALCULATED.3IONS-SCNC: 7 MMOL/L (ref 3–14)
AST SERPL W P-5'-P-CCNC: 14 U/L (ref 0–45)
BILIRUB SERPL-MCNC: 0.2 MG/DL (ref 0.2–1.3)
BUN SERPL-MCNC: 10 MG/DL (ref 7–30)
CALCIUM SERPL-MCNC: 8.7 MG/DL (ref 8.5–10.1)
CHLORIDE BLD-SCNC: 109 MMOL/L (ref 94–109)
CO2 SERPL-SCNC: 21 MMOL/L (ref 20–32)
CREAT SERPL-MCNC: 0.41 MG/DL (ref 0.52–1.04)
DEPRECATED CALCIDIOL+CALCIFEROL SERPL-MC: 28 UG/L (ref 20–75)
GFR SERPL CREATININE-BSD FRML MDRD: >90 ML/MIN/1.73M2
GLUCOSE BLD-MCNC: 123 MG/DL (ref 70–99)
HIV 1+2 AB+HIV1 P24 AG SERPL QL IA: NONREACTIVE
POTASSIUM BLD-SCNC: 3.4 MMOL/L (ref 3.4–5.3)
PROT SERPL-MCNC: 7.5 G/DL (ref 6.8–8.8)
SODIUM SERPL-SCNC: 137 MMOL/L (ref 133–144)
T PALLIDUM AB SER QL: REACTIVE
TSH SERPL DL<=0.005 MIU/L-ACNC: 1.57 MU/L (ref 0.4–4)

## 2021-09-30 PROCEDURE — 64643 CHEMODENERV 1 EXTREM 1-4 EA: CPT | Performed by: PHYSICAL MEDICINE & REHABILITATION

## 2021-09-30 PROCEDURE — 99213 OFFICE O/P EST LOW 20 MIN: CPT | Mod: 25 | Performed by: PHYSICAL MEDICINE & REHABILITATION

## 2021-09-30 PROCEDURE — 95874 GUIDE NERV DESTR NEEDLE EMG: CPT | Performed by: PHYSICAL MEDICINE & REHABILITATION

## 2021-09-30 PROCEDURE — 64642 CHEMODENERV 1 EXTREMITY 1-4: CPT | Performed by: PHYSICAL MEDICINE & REHABILITATION

## 2021-09-30 ASSESSMENT — PAIN SCALES - GENERAL: PAINLEVEL: NO PAIN (0)

## 2021-09-30 NOTE — LETTER
9/30/2021       RE: Teresita Bang  [address]  [facility] [address]     Dear Colleague,    Thank you for referring your patient, Teresita Bang, to the [facility] PHYSICAL MEDICINE AND REHABILITATION CLINIC [place] at [facility]. Please see a copy of my visit note below.    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 She is accompanied by her mother today. She was followed by Dr. Dejesus in the past and was last seen in our clinic  7/8/21.      She is very pleased with the results.  Denies any side effects. She believes the benefits started to wear off at 10 week time point; very similar to the previous injections and continues to receive benefit from the intervention.  Severity of spasticity is much improved with the botulinum toxin. Deformities and ROM have not changed as expected.     She has a power WC (new since 2018; vendor is [employer]). Does not use braces of any kind.     Medically, she has been stable with no ER visit or hospitalization. She is followed by her PCP regarding HTN, HLD and other medical issues.     New today,  --DEXA scan is still pending.  --No f/u with Dr. Pascual in the chart  --PT was not started   --Had a visit in seating clinic with [facility], DPT, NCS, ATP 9/7; recommendation as below. She has not received the equipments yet.   Pt asked about foldable power wheelchair for travel - provided resource for [employer] and educated pt and brother about lack of insurance funding and need to pay out of pocket unless fully used in home. Also educated pt and brother about process for seeking repairs and modifications for pt's current power wc to address her mobility and function concerns. Pt requires a lap tray to allow her to write  while attending [employer]. She also requires a padded foot box to accommodate her severe ankle contractures, prevent her feet from falling off the footplates (which also limits her mobility and ability to attend [place]), and to protect against new incidences of skin breakdown in the future. Thigh guides should be considered for LE alignment to assist with foot placement. L armrest requires repairs/replacement.       Physical exam:  BP (!) 158/82   Pulse 84   Temp 98.5  F (36.9  C)   Resp 16   Wt 74.4 kg (164 lb)   SpO2 96%   BMI 26.47 kg/m       She was alert pleasant, sitting comfortably in power WC.     Involuntary spasms are noted in her bilateral lower extremities. These exacerbate with attempted passive range of motion.  Modified Lorin 3/4 in adductor and KF muscles b/l, 4/4 versus fixed contracture in the bilateral ankles and toes with severe mid-foot / ankle plantar flexion inversion positioning.   Overlying skin in the thighs hamstrings is healthy without lesions or evidence of skin breakdown.    Assessment and recommendations:  Teresita is doing well. She responds well to Botox injections for the management of spasticity and associated symptoms. Contractures and deformities in her feet/ankle are not expected to change with this procedure. No change in the total dose or sites of injections today.    Teresita has severe spasticity in her lower extremities related to her spinal cord injury and has benefited from chemodenervation with botulinum toxin in the past.  We will repeat these injections today.   Will continue with hamstrings, gastrocnemius, and adductors.  She should continue with positioning and stretching exercises.    Carole will give Teresita numbers to call and schedule PT and DEXA scan. I reviewed the plan and she verbalized understanding. Carole will also assist with scheduling an appointment with Dr. Pascual for follow up after the DEXA is done.       Follow-up in 3 months time, will  call sooner if any side effects or other concerns arise.       Procedure: Chemodenervation to bilateral lower extremities utilizing onabotulinum toxin type A, Botox. I reviewed at length the risks benefits and potential time course for onset and duration. She agrees and signed the consent form today with her single initial.   present and answered all questions for this process. Total 400 units of Botox Lot # [id number] with Expiration Date: 2024 was utilized. NDC #7271-7936-21 Diluted with preservative-free normal saline ratio of 100 units per milliliter. This was divided into 4 syringes. 27-gauge recording EMG needle was utilized in conjunction with EMG guidance to identify the most active motor units while avoiding surrounding structures. All areas were cleansed with ChloraPrep. Injections are done in her WC in recline position. The following muscles were then injected.    Left le. Hamstring, 100 units divided over 2 sites  2. Gastrocnemius, 50 units divided over 2 sites  3. Adductors 50 units     Right le. Hamstring, 100 units divided over 2 sites  2. Gastrocnemius, 50 units divided over 2 sites  3. Adductors 50 units     Kathryn Prado MD  Physical Medicine & Rehabilitation    I spent a total of 20 minutes was spent face to face with the patient excluding the procedure.  Greater than 50% of the time was spent in counseling and coordinating care.          Again, thank you for allowing me to participate in the care of your patient.      Sincerely,    Kathryn Prado MD

## 2021-09-30 NOTE — PROGRESS NOTES
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 She is accompanied by her mother today. She was followed by Dr. Dejesus in the past and was last seen in our clinic  7/8/21.      She is very pleased with the results.  Denies any side effects. She believes the benefits started to wear off at 10 week time point; very similar to the previous injections and continues to receive benefit from the intervention.  Severity of spasticity is much improved with the botulinum toxin. Deformities and ROM have not changed as expected.     She has a power WC (new since 2018; vendor is [employer]). Does not use braces of any kind.     Medically, she has been stable with no ER visit or hospitalization. She is followed by her PCP regarding HTN, HLD and other medical issues.     New today,  --DEXA scan is still pending.  --No f/u with Dr. Pascual in the chart  --PT was not started   --Had a visit in seating clinic with [facility], DPT, NCS, ATP 9/7; recommendation as below. She has not received the equipments yet.   Pt asked about foldable power wheelchair for travel - provided resource for [employer] and educated pt and brother about lack of insurance funding and need to pay out of pocket unless fully used in home. Also educated pt and brother about process for seeking repairs and modifications for pt's current power wc to address her mobility and function concerns. Pt requires a lap tray to allow her to write while attending [employer]. She also requires a padded foot box to accommodate her severe ankle contractures, prevent her feet from falling off the footplates (which also limits her mobility and ability to attend [employer]), and to protect against new incidences of skin breakdown in the future. Thigh guides should be considered for LE alignment to assist with  foot placement. L armrest requires repairs/replacement.       Physical exam:  BP (!) 158/82   Pulse 84   Temp 98.5  F (36.9  C)   Resp 16   Wt 74.4 kg (164 lb)   SpO2 96%   BMI 26.47 kg/m       She was alert pleasant, sitting comfortably in power WC.     Involuntary spasms are noted in her bilateral lower extremities. These exacerbate with attempted passive range of motion.  Modified Lorin 3/4 in adductor and KF muscles b/l, 4/4 versus fixed contracture in the bilateral ankles and toes with severe mid-foot / ankle plantar flexion inversion positioning.   Overlying skin in the thighs hamstrings is healthy without lesions or evidence of skin breakdown.    Assessment and recommendations:  Teresita is doing well. She responds well to Botox injections for the management of spasticity and associated symptoms. Contractures and deformities in her feet/ankle are not expected to change with this procedure. No change in the total dose or sites of injections today.    Teresita has severe spasticity in her lower extremities related to her spinal cord injury and has benefited from chemodenervation with botulinum toxin in the past.  We will repeat these injections today.   Will continue with hamstrings, gastrocnemius, and adductors.  She should continue with positioning and stretching exercises.    Carole will give Teresita numbers to call and schedule PT and DEXA scan. I reviewed the plan and she verbalized understanding. Carole will also assist with scheduling an appointment with Dr. Pascual for follow up after the DEXA is done.       Follow-up in 3 months time, will call sooner if any side effects or other concerns arise.       Procedure: Chemodenervation to bilateral lower extremities utilizing onabotulinum toxin type A, Botox. I reviewed at length the risks benefits and potential time course for onset and duration. She agrees and signed the consent form today with her single initial.   present and  answered all questions for this process. Total 400 units of Botox Lot # [id number] with Expiration Date: 2024 was utilized. NDC #7128-6123-70 Diluted with preservative-free normal saline ratio of 100 units per milliliter. This was divided into 4 syringes. 27-gauge recording EMG needle was utilized in conjunction with EMG guidance to identify the most active motor units while avoiding surrounding structures. All areas were cleansed with ChloraPrep. Injections are done in her WC in recline position. The following muscles were then injected.    Left le. Hamstring, 100 units divided over 2 sites  2. Gastrocnemius, 50 units divided over 2 sites  3. Adductors 50 units     Right le. Hamstring, 100 units divided over 2 sites  2. Gastrocnemius, 50 units divided over 2 sites  3. Adductors 50 units     Kathryn Prado MD  Physical Medicine & Rehabilitation    I spent a total of 20 minutes was spent face to face with the patient excluding the procedure.  Greater than 50% of the time was spent in counseling and coordinating care.

## 2021-09-30 NOTE — PATIENT INSTRUCTIONS
Number to call to book the DEXA scan is [phone].    Number to call to set up Physical Therapy is  [phone].    These orders were in your chart from previous appointments.    Next botox on Monday 12/27/21 at 2:10 pm with DR Prado.

## 2021-10-01 LAB
RPR SER QL: REACTIVE
RPR SER-TITR: NORMAL {TITER}

## 2021-10-26 ENCOUNTER — OFFICE VISIT (OUTPATIENT)
Dept: PHARMACY | Facility: CLINIC | Age: 36
End: 2021-10-26
Payer: COMMERCIAL

## 2021-10-26 VITALS
HEART RATE: 74 BPM | OXYGEN SATURATION: 100 % | TEMPERATURE: 97 F | SYSTOLIC BLOOD PRESSURE: 152 MMHG | DIASTOLIC BLOOD PRESSURE: 90 MMHG

## 2021-10-26 DIAGNOSIS — I10 ESSENTIAL HYPERTENSION: Primary | ICD-10-CM

## 2021-10-26 DIAGNOSIS — E55.9 VITAMIN D DEFICIENCY: ICD-10-CM

## 2021-10-26 DIAGNOSIS — E03.9 ACQUIRED HYPOTHYROIDISM: ICD-10-CM

## 2021-10-26 PROCEDURE — 99607 MTMS BY PHARM ADDL 15 MIN: CPT | Performed by: PHARMACIST

## 2021-10-26 PROCEDURE — 99605 MTMS BY PHARM NP 15 MIN: CPT | Performed by: PHARMACIST

## 2021-10-26 NOTE — PROGRESS NOTES
Medication Therapy Management (MTM) Encounter    ASSESSMENT:                            Medication Adherence/Access: See below for considerations    Vitamin D deficiency: needs to take higher dose as prescribed based on lab results.    Hypothyroidism: stable    Hypertension: BP not at goal <130/80. Needs a second blood pressure medication. Would be OK to use labetalol especially if she is considering pregnancy but unsure if she has this at home - will reschedule her for next week to review all her medications from home as well as home blood pressure readings.     PLAN:                            1. Increase vitamin D to 2 tablets daily as prescribed.  2. If she has labetalol at home, restart taking 1 pill per day. Otherwise wait for changes to blood pressure medicines until next visit.     Follow-up: 1 week for blood pressure recheck and medication review      SUBJECTIVE/OBJECTIVE:                          Teresita Bang is a 36 year old female coming in for an initial visit. She was referred to me from Mela Shankar. [employer]  was used via telephone for this visit.     Reason for visit: ensure she is taking her medications correctly.    Allergies/ADRs: Reviewed in chart  Past Medical History: Reviewed in chart  Tobacco: She reports that she has never smoked. She has never used smokeless tobacco.  Alcohol: none    Medication Adherence/Access: Patient takes medications 2 time(s) per day.   Brought in 4 medication bottles: lisinopril, cetirizine, vitamin D, levothyroxine.    Vitamin D Deficiency: currently taking vitamin D 2000u once daily in the morning after breakfast. Reports she has been taking this dose for quite a while.   Lab Results   Component Value Date    VITDT 28 09/29/2021    VITDT 30 07/12/2021    VITDT 18 (L) 06/26/2019    VITDT 25 04/20/2018    VITDT Canceled, Test credited 02/19/2018      Hypothyroidism: Patient is taking levothyroxine 112 mcg daily. Patient is having the following  symptoms: none. Takes medication 30 minutes before breakfast every day.  TSH   Date Value Ref Range Status   09/29/2021 1.57 0.40 - 4.00 mU/L Final   12/27/2019 4.64 (H) 0.40 - 4.00 mU/L Final      Hypertension: Current medications include lisinopril 10mg daily.  She reports having a yellow pill at home from the pharmacy but not taking it- told by the pharmacy it does the same thing as another one of her medicines. Patient does self-monitor blood pressure. Home BP monitoring in range of 160's systolic over 95's diastolic.  Patient reports no current medication side effects.  BP Readings from Last 3 Encounters:   10/26/21 (!) 152/90   09/30/21 (!) 158/82   09/29/21 124/78      Out of time today to complete comprehensive medication assessment - will defer remainder to follow-up.     Today's Vitals: BP (!) 152/90   Pulse 74   Temp 97  F (36.1  C) (Temporal)   SpO2 100%   ----------------      I spent 40 minutes with this patient today. All changes were made via collaborative practice agreement with MARIELLE Suazo CNP. A copy of the visit note was provided to the patient's primary care provider.    The patient was given a summary of these recommendations.     Aide Barrientos, PharmD, BCACP      Medication Therapy Recommendations  Vitamin D deficiency    Current Medication: vitamin D3 (CHOLECALCIFEROL) 50 mcg (2000 units) tablet   Rationale: Does not understand instructions - Adherence - Adherence   Recommendation: Provide Education   Status: Patient Agreed - Adherence/Education

## 2021-10-26 NOTE — PATIENT INSTRUCTIONS
Bring all pills to your next appointment.    If you have LABETALOL at home, take 1 pill a day.     Aide Barrientos, PharmD, BCACP   Medication Management Pharmacist   [facility]  [phone]

## 2021-11-02 ENCOUNTER — OFFICE VISIT (OUTPATIENT)
Dept: PHARMACY | Facility: CLINIC | Age: 36
End: 2021-11-02
Payer: COMMERCIAL

## 2021-11-02 VITALS
SYSTOLIC BLOOD PRESSURE: 161 MMHG | DIASTOLIC BLOOD PRESSURE: 78 MMHG | OXYGEN SATURATION: 100 % | HEART RATE: 75 BPM | TEMPERATURE: 96.7 F

## 2021-11-02 DIAGNOSIS — E03.9 ACQUIRED HYPOTHYROIDISM: ICD-10-CM

## 2021-11-02 DIAGNOSIS — H10.13 ALLERGIC CONJUNCTIVITIS, BILATERAL: ICD-10-CM

## 2021-11-02 DIAGNOSIS — E55.9 VITAMIN D DEFICIENCY: ICD-10-CM

## 2021-11-02 DIAGNOSIS — Z78.9 TAKES DIETARY SUPPLEMENTS: ICD-10-CM

## 2021-11-02 DIAGNOSIS — J45.30 MILD PERSISTENT REACTIVE AIRWAY DISEASE WITHOUT COMPLICATION: ICD-10-CM

## 2021-11-02 DIAGNOSIS — I10 ESSENTIAL HYPERTENSION: Primary | ICD-10-CM

## 2021-11-02 DIAGNOSIS — J30.89 SEASONAL ALLERGIC RHINITIS DUE TO OTHER ALLERGIC TRIGGER: ICD-10-CM

## 2021-11-02 PROCEDURE — 99606 MTMS BY PHARM EST 15 MIN: CPT | Performed by: PHARMACIST

## 2021-11-02 PROCEDURE — 99607 MTMS BY PHARM ADDL 15 MIN: CPT | Performed by: PHARMACIST

## 2021-11-02 RX ORDER — HYDROCHLOROTHIAZIDE 12.5 MG/1
12.5 TABLET ORAL DAILY
Qty: 90 TABLET | Refills: 0 | Status: SHIPPED | OUTPATIENT
Start: 2021-11-02 | End: 2022-03-03

## 2021-11-02 RX ORDER — FLUTICASONE PROPIONATE 50 MCG
1 SPRAY, SUSPENSION (ML) NASAL 2 TIMES DAILY PRN
Qty: 16 G | Refills: 11
Start: 2021-11-02

## 2021-11-02 NOTE — PROGRESS NOTES
Medication Therapy Management (MTM) Encounter    ASSESSMENT:                            Medication Adherence/Access: See below for considerations    Vitamin D deficiency: continue prescribed dose of vitamin D    Hypothyroidism: stable    Hypertension: BP not at goal <130/80. She would benefit from additional therapy - add hydrochlorothiazide     Vitamin: encouraged dietary calcium and continuing vit D, will hold off on supplement at this time. Fish oil not recommended due to low level evidence of efficacy and large pill size.     Allergy: improved with cetirizine, OK to continue as needed Flonase and ketotifen     Asthma: education provided on controller vs reliever. Unnecessary to take ICS at this time considering her symptoms are very well controlled. Will switch to albuterol as needed and consider resumption of ICS if needed in the future.    PLAN:                            1. Start hydrochlorothiazide   2. Stop Flovent. Restart albuterol PRN    Follow-up: 6 weeks      SUBJECTIVE/OBJECTIVE:                          Teresita Bang is a 36 year old female coming in for a follow-up visit. She was referred to me from Mela Shankar. Annabel  used via telephone. Today's visit is a follow-up MTM visit from 10/26/21     Reason for visit: medication review, hypertension follow-up.    Allergies/ADRs: Reviewed in chart  Past Medical History: Reviewed in chart  Tobacco: She reports that she has never smoked. She has never used smokeless tobacco.  Alcohol: none      Medication Adherence/Access: Patient takes medications 2 time(s) per day.   Brought in all her oral medication bottles today.      Vitamin D Deficiency: currently taking vitamin D 2000u 2 tablets once daily in the morning after breakfast.         Lab Results   Component Value Date     VITDT 28 09/29/2021     VITDT 30 07/12/2021     VITDT 18 (L) 06/26/2019     VITDT 25 04/20/2018     VITDT Canceled, Test credited 02/19/2018      Hypothyroidism:  Patient is taking levothyroxine 112 mcg daily. Patient is having the following symptoms: none. Takes medication 30 minutes before breakfast every day.        TSH   Date Value Ref Range Status   09/29/2021 1.57 0.40 - 4.00 mU/L Final   12/27/2019 4.64 (H) 0.40 - 4.00 mU/L Final      Hypertension: Current medications include lisinopril 10mg daily.  Sometimes she takes another pill later in the day because she thinks it's not working. She brought in labetalol, has a few tablets remaining from an old prescription and has not taken any of this medication. Patient does self-monitor blood pressure. Home BP monitoring in range of 160's systolic over 95's diastolic.  Patient reports no current medication side effects.  BP Readings from Last 3 Encounters:   10/26/21 (!) 152/90   09/30/21 (!) 158/82   09/29/21 124/78   Medication history: amlodipine (headache)     Vitamin: currently taking hair,skin,nails vitamin once a day. She has a calcium supplement but finds the pills too large to take. She does not want to take a chewable calcium. She has fish oil and wonders if she should take it.    Allergy: currently taking cetirizine 10mg daily which has been helpful. Typically uses Flonase seasonally, hasn't needed lately. Used up eye drops which were helpful and asking about a refill.   Symptoms: post nasal drip, cough.     Asthma: Current medications: ICS - Flovent as needed.  Hasn't used any in the last 2 weeks, improved cough with taking cetirizine every day. Took montelukast in the past, allergy symptoms improved with switching to taking cetirizine instead. She used to have an albuterol inhaler but switched to using Flovent as needed instead.  Triggers include: pollens.  Patient reports the following symptoms: none.  Asthma Action Plan on file: YES  ACT Total Scores 9/25/2019 6/10/2020 3/22/2021   ACT TOTAL SCORE (Goal Greater than or Equal to 20) 20 19 25   In the past 12 months, how many times did you visit the emergency room  for your asthma without being admitted to the hospital? 0 0 0   In the past 12 months, how many times were you hospitalized overnight because of your asthma? 0 0 0       Today's Vitals: BP (!) 161/78   Pulse 75   Temp (!) 96.7  F (35.9  C)   SpO2 100%   ----------------      I spent 40 minutes with this patient today. All changes were made via collaborative practice agreement with MARIELLE Suazo CNP. A copy of the visit note was provided to the patient's primary care provider.    The patient was given a summary of these recommendations. See Provider note/AVS from today.     Aide Barrientos, PharmD, BCACP        Medication Therapy Recommendations  Essential hypertension    Current Medication: hydrochlorothiazide (HYDRODIURIL) 12.5 MG tablet   Rationale: Synergistic therapy - Needs additional medication therapy - Indication   Recommendation: Start Medication   Status: Accepted per CPA         Mild persistent reactive airway disease without complication    Current Medication: fluticasone (FLOVENT HFA) 44 MCG/ACT inhaler (Discontinued)   Rationale: Does not understand instructions - Adherence - Adherence   Recommendation: Change Medication - albuterol 108 (90 Base) MCG/ACT inhaler   Status: Accepted per CPA

## 2021-11-17 ENCOUNTER — TELEPHONE (OUTPATIENT)
Dept: FAMILY MEDICINE | Facility: CLINIC | Age: 36
End: 2021-11-17
Payer: COMMERCIAL

## 2021-11-17 NOTE — TELEPHONE ENCOUNTER
[facility] [employer] calling to confirm clinic fax number.   Faxing request for wheel chair repair.  Shilpi Vale RN

## 2021-11-19 ENCOUNTER — MEDICAL CORRESPONDENCE (OUTPATIENT)
Dept: HEALTH INFORMATION MANAGEMENT | Facility: CLINIC | Age: 36
End: 2021-11-19
Payer: COMMERCIAL

## 2021-11-23 ENCOUNTER — THERAPY VISIT (OUTPATIENT)
Dept: OCCUPATIONAL THERAPY | Facility: CLINIC | Age: 36
End: 2021-11-23
Attending: NURSE PRACTITIONER
Payer: COMMERCIAL

## 2021-11-23 DIAGNOSIS — G82.20 PARAPLEGIA (H): ICD-10-CM

## 2021-11-29 NOTE — PROGRESS NOTES
OT: Pt arrived for OT visit with  and mother. Following discussion with pt determined pt would be best served at the 's Assessment and Training Program at [facility]. Pt would also like to see an OT in the Wheelchair [facility] as pt has specific and specialized questions regarding additional power chairs and configuration with cars. Provided pt with contact information for both programs, a copy of her original order for OT services, and sent an in basket message to referring provider to send a new order to [facility]. For reference the program contact information is below. No charge for visit.       's Assessment and Training Program  Ph: [phone]  Fax: [phone]    Wheelchair Seating Clinic  Ph:[phone]  Fax: [phone]

## 2021-12-01 ENCOUNTER — TELEPHONE (OUTPATIENT)
Dept: FAMILY MEDICINE | Facility: CLINIC | Age: 36
End: 2021-12-01
Payer: COMMERCIAL

## 2021-12-01 DIAGNOSIS — Z99.3 WHEELCHAIR BOUND: Primary | ICD-10-CM

## 2021-12-01 DIAGNOSIS — G82.20 PARAPLEGIA (H): ICD-10-CM

## 2021-12-01 NOTE — TELEPHONE ENCOUNTER
Anna Ospina, OT  Mela Shankar, MARIELLE CNP  I'm an OT working with Teresita, after meeting with her we've determined the best place for her specific driving and WC needs are at [facility]. She will need two new orders placed, one to their 's Assessment [facility] Training Clinic with OT/PT as an option for practitioner for assessment and training. The second order would be for the [facility]ating [facility] OT evaluation for a new transport chair and assessment of current chair.     I'm sorry I was not the right fit for the patient but I think we were able to talk through the problems a bit and I called [facility] to ensure that they have the right programming for her.     Contact info if you need it:     [facility] [phone]; [phone]   [facility] [phone]; [phone]     Thank you so much and please let me know if you have any questions at all,     Anna Ospina

## 2021-12-01 NOTE — TELEPHONE ENCOUNTER
Referrals signed, please fax them to [facility] as requested below.     Thanks,   Mela PALOMARES CNP

## 2021-12-27 ENCOUNTER — OFFICE VISIT (OUTPATIENT)
Dept: PHYSICAL MEDICINE AND REHAB | Facility: CLINIC | Age: 36
End: 2021-12-27
Payer: COMMERCIAL

## 2021-12-27 VITALS
OXYGEN SATURATION: 96 % | HEART RATE: 78 BPM | DIASTOLIC BLOOD PRESSURE: 76 MMHG | RESPIRATION RATE: 18 BRPM | SYSTOLIC BLOOD PRESSURE: 176 MMHG

## 2021-12-27 DIAGNOSIS — M62.838 MUSCLE SPASTICITY: Primary | ICD-10-CM

## 2021-12-27 DIAGNOSIS — G82.22 INCOMPLETE PARAPLEGIA (H): ICD-10-CM

## 2021-12-27 DIAGNOSIS — M62.838 SPASM OF MUSCLE: ICD-10-CM

## 2021-12-27 PROCEDURE — 99213 OFFICE O/P EST LOW 20 MIN: CPT | Mod: 25 | Performed by: PHYSICAL MEDICINE & REHABILITATION

## 2021-12-27 PROCEDURE — 95874 GUIDE NERV DESTR NEEDLE EMG: CPT | Performed by: PHYSICAL MEDICINE & REHABILITATION

## 2021-12-27 PROCEDURE — 64642 CHEMODENERV 1 EXTREMITY 1-4: CPT | Mod: 59 | Performed by: PHYSICAL MEDICINE & REHABILITATION

## 2021-12-27 PROCEDURE — 96372 THER/PROPH/DIAG INJ SC/IM: CPT | Performed by: PHYSICAL MEDICINE & REHABILITATION

## 2021-12-27 PROCEDURE — 64643 CHEMODENERV 1 EXTREM 1-4 EA: CPT | Mod: 59 | Performed by: PHYSICAL MEDICINE & REHABILITATION

## 2021-12-27 NOTE — NURSING NOTE
Chief Complaint   Patient presents with     Botox     [facility] RETURN BOTOX BILATERAL LEGS        Paulino Gonzales, EMT

## 2021-12-27 NOTE — LETTER
12/27/2021       RE: Teresita Bang  [address]  [facility] [id number]     Dear Colleague,    Thank you for referring your patient, Teresita Bang, to the [facility] PHYSICAL MEDICINE AND REHABILITATION CLINIC [place] at [facility]. Please see a copy of my visit note below.    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 She is accompanied by her mother today. She was followed by Dr. Dejesus in the past and was last seen in our clinic  9/30/21.      She is overall pleased with the results.  Denies any side effects. She believes the benefits started to wear off at 10 week time point; very similar to the previous injections and continues to receive benefit from the intervention.  Severity of spasticity is much improved with the botulinum toxin. Deformities and ROM have not changed as expected.     She has a power [employer] (new since 2018; vendor is [employer]). Does not use braces of any kind.     Medically, she has been stable with no ER visit or hospitalization. She is followed by her PCP regarding HTN, HLD and other medical issues.         Physical exam:  BP (!) 176/76   Pulse 78   Resp 18   SpO2 96%      She was alert pleasant, sitting comfortably in power WC.   Unchanged -   Involuntary spasms are noted in her bilateral lower extremities. These exacerbate with attempted passive range of motion.  Modified Lorin 3/4 in adductor and KF muscles b/l, 4/4 versus fixed contracture in the bilateral ankles and toes with severe mid-foot / ankle plantar flexion inversion positioning.   Overlying skin in the thighs hamstrings is healthy without lesions or evidence of skin breakdown.    Assessment and recommendations:  She responds well to Botox injections for the management of  spasticity and associated symptoms. Contractures and deformities in her feet/ankle are not expected to change with this procedure. No change in the total dose or sites of injections today.    Teresita has severe spasticity in her lower extremities related to her spinal cord injury and has benefited from chemodenervation with botulinum toxin in the past.  We will repeat these injections today.   Will continue with hamstrings, gastrocnemius, and adductors.  She should continue with positioning and stretching exercises.    Follow-up in 3 months time, will call sooner if any side effects or other concerns arise.       Other rehab needs that we discussed again today;  --DEXA scan is still pending - gave her the number to schedule   --No f/u with Dr. Pascual in the chart - will talk to Carole to schedule   --PT was not started - will defer for now   --Had a visit in seating clinic with [facility], DPT, NCS, ATP 9/7; recommendation as below. She has not received the equipments yet. Noted that she called [employer] [facility] several times with no answer  - will ask Carole to assist.     Pt asked about foldable power wheelchair for travel - provided resource for [employer] and educated pt and brother about lack of insurance funding and need to pay out of pocket unless fully used in home. Also educated pt and brother about process for seeking repairs and modifications for pt's current power wc to address her mobility and function concerns. Pt requires a lap tray to allow her to write while attending [employer]. She also requires a padded foot box to accommodate her severe ankle contractures, prevent her feet from falling off the footplates (which also limits her mobility and ability to attend [employer]), and to protect against new incidences of skin breakdown in the future. Thigh guides should be considered for LE alignment to assist with foot placement. L armrest requires repairs/replacement.     --Reviewed OT recs from her  "visit on   Teresita was not a  before but would like to start driving with hand control vehicle. This seems very reasonable. Gave her the number below to discuss with the team at [facility].       's Assessment and Training Program  Ph: [phone]  Fax: [phone]     --she needs a commode which seems medically necessary. Ordered \"Bariatric commode with extra wide seat\" and will fax to [facility].     -------------------------------    Procedure: Chemodenervation to bilateral lower extremities utilizing onabotulinum toxin type A, Botox. I reviewed at length the risks benefits and potential time course for onset and duration. She agrees and signed the consent form today with her single initial.   present and answered all questions for this process. Total 400 units of Botox Lot # [id number] with Expiration Date: 2024 was utilized. NDC #1322-4340-38 Diluted with preservative-free normal saline ratio of 100 units per milliliter. This was divided into 4 syringes. 27-gauge recording EMG needle was utilized in conjunction with EMG guidance to identify the most active motor units while avoiding surrounding structures. All areas were cleansed with ChloraPrep. Injections are done in her WC in recline position. The following muscles were then injected.    Left le. Hamstring, 100 units divided over 2 sites  2. Gastrocnemius, 50 units divided over 2 sites  3. Adductors 50 units     Right le. Hamstring, 100 units divided over 2 sites  2. Gastrocnemius, 50 units divided over 2 sites  3. Adductors 50 units     Kathryn Prado MD  Physical Medicine & Rehabilitation         I spent a total of 25 minutes was spent face to face with the patient excluding the procedure.  Greater than 50% of the time was spent in counseling and coordinating care.   "

## 2021-12-27 NOTE — PATIENT INSTRUCTIONS
Number to call to book the DEXA scan is [phone].     's Assessment and Training Program at [facility]  Ph: [phone]  Fax: [phone]     Wheelchair Seating Clinic for wheelchair   Ph:[phone]  Fax: [phone]    Please call Carole Mccrary RN care coordinator with any questions at [phone].

## 2021-12-27 NOTE — PROGRESS NOTES
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 She is accompanied by her mother today. She was followed by Dr. Dejesus in the past and was last seen in our clinic  9/30/21.      She is overall pleased with the results.  Denies any side effects. She believes the benefits started to wear off at 10 week time point; very similar to the previous injections and continues to receive benefit from the intervention.  Severity of spasticity is much improved with the botulinum toxin. Deformities and ROM have not changed as expected.     She has a power WC (new since 2018; vendor is [employer]). Does not use braces of any kind.     Medically, she has been stable with no ER visit or hospitalization. She is followed by her PCP regarding HTN, HLD and other medical issues.         Physical exam:  BP (!) 176/76   Pulse 78   Resp 18   SpO2 96%      She was alert pleasant, sitting comfortably in power WC.   Unchanged -   Involuntary spasms are noted in her bilateral lower extremities. These exacerbate with attempted passive range of motion.  Modified Lorin 3/4 in adductor and KF muscles b/l, 4/4 versus fixed contracture in the bilateral ankles and toes with severe mid-foot / ankle plantar flexion inversion positioning.   Overlying skin in the thighs hamstrings is healthy without lesions or evidence of skin breakdown.    Assessment and recommendations:  She responds well to Botox injections for the management of spasticity and associated symptoms. Contractures and deformities in her feet/ankle are not expected to change with this procedure. No change in the total dose or sites of injections today.    Teresita has severe spasticity in her lower extremities related to her spinal cord injury and has benefited from chemodenervation with botulinum toxin in the past.   "We will repeat these injections today.   Will continue with hamstrings, gastrocnemius, and adductors.  She should continue with positioning and stretching exercises.    Follow-up in 3 months time, will call sooner if any side effects or other concerns arise.       Other rehab needs that we discussed again today;  --DEXA scan is still pending - gave her the number to schedule   --No f/u with Dr. Pascual in the chart - will talk to Carole to schedule   --PT was not started - will defer for now   --Had a visit in seating clinic with [facility], DPT, NCS, ATP 9/7; recommendation as below. She has not received the equipments yet. Noted that she called [employer] [facility] several times with no answer  - will ask Carole to assist.     Pt asked about foldable power wheelchair for travel - provided resource for [employer] and educated pt and brother about lack of insurance funding and need to pay out of pocket unless fully used in home. Also educated pt and brother about process for seeking repairs and modifications for pt's current power wc to address her mobility and function concerns. Pt requires a lap tray to allow her to write while attending [employer]. She also requires a padded foot box to accommodate her severe ankle contractures, prevent her feet from falling off the footplates (which also limits her mobility and ability to attend [employer]), and to protect against new incidences of skin breakdown in the future. Thigh guides should be considered for LE alignment to assist with foot placement. L armrest requires repairs/replacement.     --Reviewed OT recs from her visit on 11/23  Teresita was not a  before but would like to start driving with hand control vehicle. This seems very reasonable. Gave her the number below to discuss with the team at [facility].       's Assessment and Training Program  Ph: [phone]  Fax: [phone]     --she needs a commode which seems medically necessary. Ordered \"Bariatric " "commode with extra wide seat\" and will fax to [employer].     -------------------------------    Procedure: Chemodenervation to bilateral lower extremities utilizing onabotulinum toxin type A, Botox. I reviewed at length the risks benefits and potential time course for onset and duration. She agrees and signed the consent form today with her single initial.   present and answered all questions for this process. Total 400 units of Botox Lot # [id number] with Expiration Date: 2024 was utilized. NDC #7810-9309-11 Diluted with preservative-free normal saline ratio of 100 units per milliliter. This was divided into 4 syringes. 27-gauge recording EMG needle was utilized in conjunction with EMG guidance to identify the most active motor units while avoiding surrounding structures. All areas were cleansed with ChloraPrep. Injections are done in her WC in recline position. The following muscles were then injected.    Left le. Hamstring, 100 units divided over 2 sites  2. Gastrocnemius, 50 units divided over 2 sites  3. Adductors 50 units     Right le. Hamstring, 100 units divided over 2 sites  2. Gastrocnemius, 50 units divided over 2 sites  3. Adductors 50 units     Kathryn Prado MD  Physical Medicine & Rehabilitation         I spent a total of 25 minutes was spent face to face with the patient excluding the procedure.  Greater than 50% of the time was spent in counseling and coordinating care.   "

## 2022-01-31 ENCOUNTER — TELEPHONE (OUTPATIENT)
Dept: PHYSICAL MEDICINE AND REHAB | Facility: CLINIC | Age: 37
End: 2022-01-31

## 2022-01-31 NOTE — TELEPHONE ENCOUNTER
"[facility] [phone]  Fax: [phone]    Caregiver c/b [phone]    Writer spoke with [facility], they have not received the order for shower chair (office visit 12/27/21 orders)    <Will not send order based on the notes below>      Pt has a commode chair from [employer], issued in 2019 has extra wide seat  (24\")    Does not qualify for bariatric items using her measurements for seating eval:  5 ft 6 in 162 # (9/7/21)      For future needs:  Documentation needed:  Confined to one level without access to a bathroom    Should qualify for shower chair without wheels q 5 years, through [place] [employer] (see DHS website)    So in 2024 we can submit for a different shower chair if she needs newer equipment then.    This note was outlined 1/31/22  Encounter was pended with incomplete note and not reviewed again until 8/29/22.    Carole Mccrary RN on 8/29/2022 at 4:02 PM        "

## 2022-02-14 ENCOUNTER — TELEPHONE (OUTPATIENT)
Dept: FAMILY MEDICINE | Facility: CLINIC | Age: 37
End: 2022-02-14

## 2022-02-14 DIAGNOSIS — E03.9 ACQUIRED HYPOTHYROIDISM: ICD-10-CM

## 2022-02-14 DIAGNOSIS — N31.9 NEUROGENIC BLADDER: ICD-10-CM

## 2022-02-14 DIAGNOSIS — K59.09 OTHER CONSTIPATION: ICD-10-CM

## 2022-02-14 DIAGNOSIS — G82.20 PARAPLEGIA (H): Primary | ICD-10-CM

## 2022-02-14 NOTE — TELEPHONE ENCOUNTER
Reason for Call:  Medication or medication refill:    Do you use a [facility]?  Name of the pharmacy and phone number for the current request:  [facility] - [place], [place] - [facility] FATUMA HERNANDEZ (Pharmacy)  Name of the medication requested:   levothyroxine (SYNTHROID/LEVOTHROID) 112 MCG tablet    bisacodyl (DULCOLAX) 10 MG suppository    Other request: n/a    Can we leave a detailed message on this number? YES    Phone number patient can be reached at: Cell number on file:    Telephone Information:   Mobile [phone]       Best Time: any    Call taken on 2/14/2022 at 1:34 PM by Demetra Gan

## 2022-02-14 NOTE — TELEPHONE ENCOUNTER
Patient was in clinic on 2/14 requesting an order of a few supplies to be ordered:       Adult incontinent underwear two boxes( each box 100 units)  Adult incontinent briefs one box ( each box 120 units)  Underpad heavy absorbency two boxes ( each box 100 units)  Gloves one box      Patient is also requesting a [employer]uxe Bariatric Drop-Arm Commode, Assembled.    Appointments in Next Year    Feb 28, 2022 10:20 AM  (Arrive by 10:00 AM)  Provider Visit with MARIELLE Suazo CNP  [facility] ([facility]  ) [phone]            Patient has an upcoming appointment on 2/28 virtually to also discuss some of these things as well. Paper of needed supplies will be placed in Mela's box as well

## 2022-02-15 RX ORDER — LEVOTHYROXINE SODIUM 112 UG/1
112 TABLET ORAL DAILY
Qty: 90 TABLET | Refills: 1 | Status: SHIPPED | OUTPATIENT
Start: 2022-02-15 | End: 2022-09-26

## 2022-02-15 RX ORDER — BISACODYL 10 MG
10 SUPPOSITORY, RECTAL RECTAL DAILY PRN
Qty: 30 SUPPOSITORY | Refills: 11 | Status: SHIPPED | OUTPATIENT
Start: 2022-02-15 | End: 2022-09-28

## 2022-02-15 NOTE — TELEPHONE ENCOUNTER
Prescription approved per [facility] Refill Protocol.    Pooja Cleveland RN   [facility]

## 2022-02-16 NOTE — TELEPHONE ENCOUNTER
Done, not sure if they will allow the order before FTF appointment but I did sign for them.     Thanks,   Mela PALOMARES CNP

## 2022-02-17 NOTE — TELEPHONE ENCOUNTER
spoke with pt and spouse, notified that supplies sent through [employer] and provided number so they can check status of order. Verbalized understanding and agreement.     Thanks,  SABIHA Rossi  [facility]

## 2022-02-21 ENCOUNTER — MEDICAL CORRESPONDENCE (OUTPATIENT)
Dept: HEALTH INFORMATION MANAGEMENT | Facility: CLINIC | Age: 37
End: 2022-02-21
Payer: COMMERCIAL

## 2022-02-21 ENCOUNTER — TELEPHONE (OUTPATIENT)
Dept: FAMILY MEDICINE | Facility: CLINIC | Age: 37
End: 2022-02-21
Payer: COMMERCIAL

## 2022-02-28 ENCOUNTER — VIRTUAL VISIT (OUTPATIENT)
Dept: FAMILY MEDICINE | Facility: CLINIC | Age: 37
End: 2022-02-28
Payer: COMMERCIAL

## 2022-02-28 DIAGNOSIS — G81.10 SPASTIC HEMIPLEGIA, UNSPECIFIED ETIOLOGY, UNSPECIFIED LATERALITY (H): Primary | ICD-10-CM

## 2022-02-28 DIAGNOSIS — I10 ESSENTIAL HYPERTENSION: ICD-10-CM

## 2022-02-28 DIAGNOSIS — G82.22 INCOMPLETE PARAPLEGIA (H): ICD-10-CM

## 2022-02-28 PROCEDURE — 99215 OFFICE O/P EST HI 40 MIN: CPT | Performed by: NURSE PRACTITIONER

## 2022-02-28 NOTE — Clinical Note
Can you reach out to do med rec with pt? I am wondering if she is taking hydrochlorothiazide and lisinopril? She has family helping and I have no idea what is going on with her med and things, see my note and let me know if you need a referral for MTM. Thanks SO much, Mela
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
## 2022-03-01 PROBLEM — G81.10: Status: ACTIVE | Noted: 2022-03-01

## 2022-03-01 RX ORDER — LISINOPRIL 10 MG/1
10 TABLET ORAL DAILY
Qty: 90 TABLET | Refills: 1 | Status: CANCELLED | OUTPATIENT
Start: 2022-03-01

## 2022-03-01 RX ORDER — HYDROCHLOROTHIAZIDE 12.5 MG/1
12.5 TABLET ORAL DAILY
Qty: 90 TABLET | Refills: 0 | Status: CANCELLED | OUTPATIENT
Start: 2022-03-01

## 2022-03-01 NOTE — PATIENT INSTRUCTIONS
You need to call:  [facility]: [phone]  For the chair needs, the commode may even already be there as well    Call [employer] [facility] for the briefs (commode if [facility] is not supplying this for you)  [phone] [address] in [place]    [phone]    's Assessment and Training Program  Ph: [phone]

## 2022-03-03 ENCOUNTER — VIRTUAL VISIT (OUTPATIENT)
Dept: PHARMACY | Facility: CLINIC | Age: 37
End: 2022-03-03
Payer: COMMERCIAL

## 2022-03-03 DIAGNOSIS — I10 ESSENTIAL HYPERTENSION: Primary | ICD-10-CM

## 2022-03-03 DIAGNOSIS — E55.9 VITAMIN D DEFICIENCY: ICD-10-CM

## 2022-03-03 DIAGNOSIS — E03.9 ACQUIRED HYPOTHYROIDISM: ICD-10-CM

## 2022-03-03 PROCEDURE — 99607 MTMS BY PHARM ADDL 15 MIN: CPT | Performed by: PHARMACIST

## 2022-03-03 PROCEDURE — 99606 MTMS BY PHARM EST 15 MIN: CPT | Performed by: PHARMACIST

## 2022-03-03 RX ORDER — LABETALOL 100 MG/1
1 TABLET, FILM COATED ORAL 2 TIMES DAILY
COMMUNITY
Start: 2022-03-01 | End: 2022-09-28

## 2022-03-03 RX ORDER — HYDROCHLOROTHIAZIDE 12.5 MG/1
12.5 TABLET ORAL DAILY
Qty: 90 TABLET | Refills: 0 | Status: SHIPPED | OUTPATIENT
Start: 2022-03-03 | End: 2022-08-16

## 2022-03-03 NOTE — PROGRESS NOTES
Medication Therapy Management (MTM) Encounter    ASSESSMENT:                            Medication Adherence/Access: See below for considerations    Vitamin D deficiency: stable    Hypothyroidism: stable    Hypertension: BP not at goal <130/80. Restart hydrochlorothiazide, will hold off on lisinopril given her reported home readings.     PLAN:                            Restart hydrochlorothiazide 12.5mg daily    Follow-up: with PCP in 2 weeks, MTM in 3 months or sooner as needed    SUBJECTIVE/OBJECTIVE:                          Teresita Bang is a 37 year old female called for a follow-up visit. Patient seen with Annabel . Today's visit is a follow-up MTM visit from 11/2/2021     Reason for visit: med rec antihypertensives per PCP request.    Allergies/ADRs: Reviewed in chart  Past Medical History: Reviewed in chart  Tobacco: She reports that she has never smoked. She has never used smokeless tobacco.  Alcohol: none      Medication Adherence/Access: Patient takes medications 2 time(s) per day.      Vitamin D Deficiency: currently taking vitamin D 2000u 2 tablets once daily in the morning after breakfast without problems.   Lab Results   Component Value Date    VITDT 28 09/29/2021    VITDT 30 07/12/2021    VITDT 18 (L) 06/26/2019    VITDT 25 04/20/2018    VITDT Canceled, Test credited 02/19/2018     Hypothyroidism: Patient is taking levothyroxine 112 mcg daily. Patient is having the following symptoms: none. Takes medication 30 minutes before breakfast every day.  TSH   Date Value Ref Range Status   09/29/2021 1.57 0.40 - 4.00 mU/L Final   12/27/2019 4.64 (H) 0.40 - 4.00 mU/L Final      Hypertension: Current medications include labetalol 100mg 2 times a day. She is not taking lisinopril or hydrochlorothiazide.   Patient does self-monitor blood pressure. Home BP monitoring in range of 135-140's systolic over 90's diastolic.  Patient reports no current medication side effects.  BP Readings from Last 3  Encounters:   12/27/21 (!) 176/76   11/02/21 (!) 161/78   10/26/21 (!) 152/90   Medication history: amlodipine (headache)       Today's Vitals: There were no vitals taken for this visit.  ----------------      I spent 20 minutes with this patient today. All changes were made via collaborative practice agreement with MARIELLE Suazo CNP. A copy of the visit note was provided to the patient's provider(s).    Aide Barrientos, PharmD, BCACP     Telemedicine Visit Details  Type of service:  Telephone visit  Start Time: 2:40 PM  End Time: 2:59 PM  Originating Location (patient location): [place]  Distant Location (provider location):  [facility]     Medication Therapy Recommendations  Essential hypertension    Current Medication: hydrochlorothiazide (HYDRODIURIL) 12.5 MG tablet   Rationale: Medication product not available - Adherence - Adherence   Recommendation: Start Medication   Status: Accepted per CPA

## 2022-03-03 NOTE — Clinical Note
FYI - only taking labetalol, I restarted hydrochlorothiazide and held off on lisinopril since she reports her home BP readings are pretty good.

## 2022-03-16 ENCOUNTER — OFFICE VISIT (OUTPATIENT)
Dept: FAMILY MEDICINE | Facility: CLINIC | Age: 37
End: 2022-03-16
Payer: COMMERCIAL

## 2022-03-16 VITALS
DIASTOLIC BLOOD PRESSURE: 62 MMHG | SYSTOLIC BLOOD PRESSURE: 118 MMHG | OXYGEN SATURATION: 99 % | TEMPERATURE: 97.7 F | HEART RATE: 88 BPM

## 2022-03-16 DIAGNOSIS — I10 ESSENTIAL HYPERTENSION: ICD-10-CM

## 2022-03-16 DIAGNOSIS — J45.30 MILD PERSISTENT REACTIVE AIRWAY DISEASE WITHOUT COMPLICATION: Primary | ICD-10-CM

## 2022-03-16 DIAGNOSIS — R42 DIZZINESS: ICD-10-CM

## 2022-03-16 DIAGNOSIS — E03.9 ACQUIRED HYPOTHYROIDISM: ICD-10-CM

## 2022-03-16 LAB
BASOPHILS # BLD AUTO: 0 10E3/UL (ref 0–0.2)
BASOPHILS NFR BLD AUTO: 0 %
EOSINOPHIL # BLD AUTO: 0.4 10E3/UL (ref 0–0.7)
EOSINOPHIL NFR BLD AUTO: 6 %
ERYTHROCYTE [DISTWIDTH] IN BLOOD BY AUTOMATED COUNT: 14.9 % (ref 10–15)
HCT VFR BLD AUTO: 33.5 % (ref 35–47)
HGB BLD-MCNC: 10.4 G/DL (ref 11.7–15.7)
LYMPHOCYTES # BLD AUTO: 1.9 10E3/UL (ref 0.8–5.3)
LYMPHOCYTES NFR BLD AUTO: 26 %
MCH RBC QN AUTO: 26.5 PG (ref 26.5–33)
MCHC RBC AUTO-ENTMCNC: 31 G/DL (ref 31.5–36.5)
MCV RBC AUTO: 86 FL (ref 78–100)
MONOCYTES # BLD AUTO: 0.6 10E3/UL (ref 0–1.3)
MONOCYTES NFR BLD AUTO: 8 %
NEUTROPHILS # BLD AUTO: 4.5 10E3/UL (ref 1.6–8.3)
NEUTROPHILS NFR BLD AUTO: 60 %
PLATELET # BLD AUTO: 269 10E3/UL (ref 150–450)
RBC # BLD AUTO: 3.92 10E6/UL (ref 3.8–5.2)
WBC # BLD AUTO: 7.4 10E3/UL (ref 4–11)

## 2022-03-16 PROCEDURE — 99214 OFFICE O/P EST MOD 30 MIN: CPT | Performed by: NURSE PRACTITIONER

## 2022-03-16 PROCEDURE — 36415 COLL VENOUS BLD VENIPUNCTURE: CPT | Performed by: NURSE PRACTITIONER

## 2022-03-16 PROCEDURE — 82728 ASSAY OF FERRITIN: CPT | Performed by: NURSE PRACTITIONER

## 2022-03-16 PROCEDURE — 80050 GENERAL HEALTH PANEL: CPT | Performed by: NURSE PRACTITIONER

## 2022-03-16 PROCEDURE — 82306 VITAMIN D 25 HYDROXY: CPT | Performed by: NURSE PRACTITIONER

## 2022-03-16 RX ORDER — FLUTICASONE PROPIONATE 44 MCG
1 AEROSOL WITH ADAPTER (GRAM) INHALATION 2 TIMES DAILY
Qty: 10.6 G | Refills: 3 | Status: SHIPPED | OUTPATIENT
Start: 2022-03-16 | End: 2022-09-28

## 2022-03-16 RX ORDER — FLUTICASONE PROPIONATE 44 MCG
1 AEROSOL WITH ADAPTER (GRAM) INHALATION 2 TIMES DAILY
COMMUNITY
Start: 2022-03-10 | End: 2022-03-16

## 2022-03-16 NOTE — PROGRESS NOTES
Assessment & Plan     Mild persistent reactive airway disease without complication  Patient has been confused regarding which inhaler to take when. Instructions given to use Flovent inhaler every day as ordered. Discussed how this is a preventative, maintenance medication. Instructions given to use albuterol inhaler as needed for shortness of breath. Discussed how these medications work in different ways and can be taken together if needed. Patient verbalized understanding through .  - FLOVENT HFA 44 MCG/ACT inhaler  Dispense: 10.6 g; Refill: 3    Acquired hypothyroidism  Will check thyroid level and follow-up with patient pending results. Continue current medication dosage for now.    Essential hypertension  Blood pressure appears to be well controlled. According to patient home blood pressures range from 110s-150s / 60s-90s. She does state that at times she has felt dizzy after taking her medication. Discussed trying to check her blood pressure during these spells to determine if it is related. Discussed holding her medication if her blood pressure falls below 90/50 or if the dizziness worsens and then to call the clinic. Continue current medications as ordered.    Dizziness  Will rule out an organic cause for her dizziness and check the following labs:  - TSH with free T4 reflex  - Comprehensive metabolic panel (BMP + Alb, Alk Phos, ALT, AST, Total. Bili, TP)  - Ferritin  - CBC with platelets and differential  - Vitamin D Deficiency        See Patient Instructions    Return in about 3 months (around 6/16/2022) for Follow up.    Soco Gonzales DNP FNP student    I was present with the NP Student during the history and exam.  I discussed the case with the NP Student and agree with the findings as documented in the assessment and plan.     MARIELLE Suazo [facility]    Shaila Lo is a 37 year old who presents for the following health issues     HPI      Hypertension Follow-up      Do you check your blood pressure regularly outside of the clinic? Yes     Are your blood pressures ever more than 140 on the top number (systolic) OR more   than 90 on the bottom number (diastolic), for example 140/90? Yes  She had dizziness before with Labetalol it has seemed to increase: before she was only taking one tablet per day  She does take her BP at home: highest-156/90, lowest- 118/60 (this was when she felt dizzy)    Asthma Follow-Up  Not taking flovent twice a day as ordered  Doesn't have albuterol    Was ACT completed today?  No      Do you have a cough?  No    Are you experiencing any wheezing in your chest?  No    Do you have any shortness of breath?  No     How often are you using a short-acting (rescue) inhaler or nebulizer, such as Albuterol?  A few times a month    How many days per week do you miss taking your asthma controller medication?  She has not been taking this regularly as she thought it was as needed    Please describe any recent triggers for your asthma: None    Have you had any Emergency Room Visits, Urgent Care Visits, or Hospital Admissions since your last office visit?  No    Hypothyroidism Follow-up      Since last visit, patient describes the following symptoms: Weight stable, no hair loss, no skin changes, no constipation, no loose stools      Review of Systems   Constitutional, HEENT, cardiovascular, pulmonary, gi and gu systems are negative, except as otherwise noted.      Objective    /62   Pulse 88   Temp 97.7  F (36.5  C) (Temporal)   SpO2 99%   There is no height or weight on file to calculate BMI.  Physical Exam   GENERAL: healthy, alert and no distress  EYES: Eyes grossly normal to inspection, PERRL and conjunctivae and sclerae normal  NECK: no adenopathy, no asymmetry, masses, or scars and thyroid normal to palpation  RESP: lungs clear to auscultation - no rales, rhonchi or wheezes  CV: regular rate and rhythm, normal S1 S2, no  S3 or S4, no murmur, click or rub, no peripheral edema and peripheral pulses strong  ABDOMEN: soft, nontender, no hepatosplenomegaly, no masses and bowel sounds normal  MS: baseline paraplegic, upper extremities with full ROM and equal strength  NEURO: mentation intact and paraplegic  PSYCH: mentation appears normal, affect normal/bright    Results for orders placed or performed in visit on 03/16/22 (from the past 24 hour(s))   CBC with platelets and differential    Narrative    The following orders were created for panel order CBC with platelets and differential.  Procedure                               Abnormality         Status                     ---------                               -----------         ------                     CBC with platelets and d...[[id number]]                      In process                   Please view results for these tests on the individual orders.

## 2022-03-17 LAB
ALBUMIN SERPL-MCNC: 3.1 G/DL (ref 3.4–5)
ALP SERPL-CCNC: 96 U/L (ref 40–150)
ALT SERPL W P-5'-P-CCNC: 22 U/L (ref 0–50)
ANION GAP SERPL CALCULATED.3IONS-SCNC: 9 MMOL/L (ref 3–14)
AST SERPL W P-5'-P-CCNC: 20 U/L (ref 0–45)
BILIRUB SERPL-MCNC: 0.2 MG/DL (ref 0.2–1.3)
BUN SERPL-MCNC: 7 MG/DL (ref 7–30)
CALCIUM SERPL-MCNC: 8.8 MG/DL (ref 8.5–10.1)
CHLORIDE BLD-SCNC: 107 MMOL/L (ref 94–109)
CO2 SERPL-SCNC: 21 MMOL/L (ref 20–32)
CREAT SERPL-MCNC: 0.42 MG/DL (ref 0.52–1.04)
DEPRECATED CALCIDIOL+CALCIFEROL SERPL-MC: 30 UG/L (ref 20–75)
FERRITIN SERPL-MCNC: 10 NG/ML (ref 12–150)
GFR SERPL CREATININE-BSD FRML MDRD: >90 ML/MIN/1.73M2
GLUCOSE BLD-MCNC: 147 MG/DL (ref 70–99)
POTASSIUM BLD-SCNC: 3.5 MMOL/L (ref 3.4–5.3)
PROT SERPL-MCNC: 7.3 G/DL (ref 6.8–8.8)
SODIUM SERPL-SCNC: 137 MMOL/L (ref 133–144)
TSH SERPL DL<=0.005 MIU/L-ACNC: 2.49 MU/L (ref 0.4–4)

## 2022-03-18 ENCOUNTER — TELEPHONE (OUTPATIENT)
Dept: FAMILY MEDICINE | Facility: CLINIC | Age: 37
End: 2022-03-18
Payer: COMMERCIAL

## 2022-03-18 NOTE — TELEPHONE ENCOUNTER
Attempted to call, left ulises asking pt to call back.    Please call with results. Please let her know she is indeed anemic that is likely the cause of her dizziness. Have her start iron, if tolerated she can take 2-three times a day to get her levels up to normal sooner. Follow up in 1 month to recheck.   Also be sure she is taking good amt Vit D 5000 units daily.   Thyroid and other labs ok     Thanks,   MARIELLE Suazo CNP      Thanks,  SABIHA Rossi  [facility]

## 2022-03-21 ENCOUNTER — OFFICE VISIT (OUTPATIENT)
Dept: PHYSICAL MEDICINE AND REHAB | Facility: CLINIC | Age: 37
End: 2022-03-21
Payer: COMMERCIAL

## 2022-03-21 VITALS
RESPIRATION RATE: 16 BRPM | SYSTOLIC BLOOD PRESSURE: 142 MMHG | HEART RATE: 79 BPM | DIASTOLIC BLOOD PRESSURE: 84 MMHG | OXYGEN SATURATION: 98 %

## 2022-03-21 DIAGNOSIS — M62.838 MUSCLE SPASTICITY: Primary | ICD-10-CM

## 2022-03-21 DIAGNOSIS — G82.22 INCOMPLETE PARAPLEGIA (H): ICD-10-CM

## 2022-03-21 DIAGNOSIS — M62.838 SPASM OF MUSCLE: ICD-10-CM

## 2022-03-21 PROCEDURE — 64643 CHEMODENERV 1 EXTREM 1-4 EA: CPT | Mod: 59 | Performed by: PHYSICAL MEDICINE & REHABILITATION

## 2022-03-21 PROCEDURE — 96372 THER/PROPH/DIAG INJ SC/IM: CPT | Performed by: PHYSICAL MEDICINE & REHABILITATION

## 2022-03-21 PROCEDURE — 99212 OFFICE O/P EST SF 10 MIN: CPT | Mod: 25 | Performed by: PHYSICAL MEDICINE & REHABILITATION

## 2022-03-21 PROCEDURE — 64642 CHEMODENERV 1 EXTREMITY 1-4: CPT | Mod: 59 | Performed by: PHYSICAL MEDICINE & REHABILITATION

## 2022-03-21 PROCEDURE — 95874 GUIDE NERV DESTR NEEDLE EMG: CPT | Performed by: PHYSICAL MEDICINE & REHABILITATION

## 2022-03-21 ASSESSMENT — PAIN SCALES - GENERAL: PAINLEVEL: NO PAIN (0)

## 2022-03-21 NOTE — PROGRESS NOTES
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 She is accompanied by her mother today. She was followed by Dr. Dejesus in the past and was last seen in our clinic  12/27/21.      She is overall pleased with the results.  Denies any side effects. She believes the benefits started to wear off at 10 week time point; very similar to the previous injections and continues to receive benefit from the intervention.  Severity of spasticity is much improved with the botulinum toxin. Deformities and ROM have not changed as expected.     She has a power WC (new since 2018; vendor is [employer]). Does not use braces of any kind.     Medically, she has been stable with no ER visit or hospitalization. She is followed by her PCP regarding HTN, HLD and other medical issues.       Physical exam:  BP (!) 142/84   Pulse 79   Resp 16   SpO2 98%      She was alert pleasant, sitting comfortably in power WC.   Unchanged -   Involuntary spasms are noted in her bilateral lower extremities. These exacerbate with attempted passive range of motion.  Modified Lorin 3/4 in adductor and KF muscles b/l, 4/4 versus fixed contracture in the bilateral ankles and toes with severe mid-foot / ankle plantar flexion inversion positioning.   Overlying skin in the thighs hamstrings is healthy without lesions or evidence of skin breakdown.    Assessment and recommendations:  She responds well to Botox injections for the management of spasticity and associated symptoms. Contractures and deformities in her feet/ankle are not expected to change with this procedure. No change in the total dose or sites of injections today.    Follow-up in 3 months time, will call sooner if any side effects or other concerns arise.       We have discussed other rehab needs several times in our clinic. We  "uses  all the time but she doesn't seem to follow through with any of our recommendations.   --DEXA scan is still pending - gave her the number to schedule again today.   --No f/u with Dr. Pascual in the chart - will schedule after DEXA is done.  --PT was not started - will defer for now   --Had a visit in seating clinic with [facility], DPT, NCS, ATP 9/7/2021; recommendation as below. She has not received the equipments yet.     Pt asked about foldable power wheelchair for travel - provided resource for [employer] and educated pt and brother about lack of insurance funding and need to pay out of pocket unless fully used in home. Also educated pt and brother about process for seeking repairs and modifications for pt's current power wc to address her mobility and function concerns. Pt requires a lap tray to allow her to write while attending [employer]. She also requires a padded foot box to accommodate her severe ankle contractures, prevent her feet from falling off the footplates (which also limits her mobility and ability to attend [employer]), and to protect against new incidences of skin breakdown in the future. Thigh guides should be considered for LE alignment to assist with foot placement. L armrest requires repairs/replacement.     --Reviewed OT recs from her visit on 11/23/2021  Teresita was not a  before but would like to start driving with hand control vehicle. This seems very reasonable. Gave her the number below to discuss with the team at [facility].       's Assessment and Training Program  Ph: [phone]  Fax: [phone]     --she needs a commode which seems medically necessary. Ordered \"Bariatric commode with extra wide seat\" and faxed to [facility] in Dec 2021.     -------------------------------    Procedure: Chemodenervation to bilateral lower extremities utilizing onabotulinum toxin type A, Botox. I reviewed at length the risks benefits and potential time course for onset " and duration. She agrees and signed the consent form today with her single initial.   present and answered all questions for this process. Total 400 units of Botox Lot # [id number] with Expiration Date: 2024 was utilized. NDC #8207-6364-93 Diluted with preservative-free normal saline ratio of 100 units per milliliter. This was divided into 4 syringes. 27-gauge recording EMG needle was utilized in conjunction with EMG guidance to identify the most active motor units while avoiding surrounding structures. All areas were cleansed with ChloraPrep. Injections are done in her WC in recline position. The following muscles were then injected.    Left le. Hamstring, 100 units divided over 2 sites  2. Gastrocnemius, 50 units divided over 2 sites  3. Adductors 50 units     Right le. Hamstring, 100 units divided over 2 sites  2. Gastrocnemius, 50 units divided over 2 sites  3. Adductors 50 units     Kathryn Prado MD  Physical Medicine & Rehabilitation       I spent a total of 20 minutes was spent face to face with the patient excluding the procedure.  Greater than 50% of the time was spent in counseling and coordinating care.

## 2022-03-21 NOTE — TELEPHONE ENCOUNTER
Attempted to call, left vm stating labs ok except iron and vit D, needs to purchased otc supplement and call to have us help schedule a one month follow up lab visit.     Thanks,  SABIHA Rossi  [facility]

## 2022-03-21 NOTE — LETTER
3/21/2022       RE: Teresita Bang  [address]  [place] [address]     Dear Colleague,    Thank you for referring your patient, Teresita Bang, to the [facility]YSICAL [facility]ICINE [facility] REHABILITATION [facility]IN[facility] [place] at [facility]. Please see a copy of my visit note below.    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 She is accompanied by her mother today. She was followed by Dr. Dejesus in the past and was last seen in our clinic  12/27/21.      She is overall pleased with the results.  Denies any side effects. She believes the benefits started to wear off at 10 week time point; very similar to the previous injections and continues to receive benefit from the intervention.  Severity of spasticity is much improved with the botulinum toxin. Deformities and ROM have not changed as expected.     She has a power 3LM (new since 2018; vendor is [employer]). Does not use braces of any kind.     Medically, she has been stable with no ER visit or hospitalization. She is followed by her PCP regarding HTN, HLD and other medical issues.       Physical exam:  BP (!) 142/84   Pulse 79   Resp 16   SpO2 98%      She was alert pleasant, sitting comfortably in power WC.   Unchanged -   Involuntary spasms are noted in her bilateral lower extremities. These exacerbate with attempted passive range of motion.  Modified Lorin 3/4 in adductor and KF muscles b/l, 4/4 versus fixed contracture in the bilateral ankles and toes with severe mid-foot / ankle plantar flexion inversion positioning.   Overlying skin in the thighs hamstrings is healthy without lesions or evidence of skin breakdown.    Assessment and recommendations:  She responds well to Botox injections for the management of  spasticity and associated symptoms. Contractures and deformities in her feet/ankle are not expected to change with this procedure. No change in the total dose or sites of injections today.    Follow-up in 3 months time, will call sooner if any side effects or other concerns arise.       We have discussed other rehab needs several times in our clinic. We uses  all the time but she doesn't seem to follow through with any of our recommendations.   --DEXA scan is still pending - gave her the number to schedule again today.   --No f/u with Dr. Pascual in the chart - will schedule after DEXA is done.  --PT was not started - will defer for now   --Had a visit in seating clinic with [facility], DPT, NCS, ATP 9/7/2021; recommendation as below. She has not received the equipments yet.     Pt asked about foldable power wheelchair for travel - provided resource for [employer] and educated pt and brother about lack of insurance funding and need to pay out of pocket unless fully used in home. Also educated pt and brother about process for seeking repairs and modifications for pt's current power wc to address her mobility and function concerns. Pt requires a lap tray to allow her to write while attending [employer]. She also requires a padded foot box to accommodate her severe ankle contractures, prevent her feet from falling off the footplates (which also limits her mobility and ability to attend [employer]), and to protect against new incidences of skin breakdown in the future. Thigh guides should be considered for LE alignment to assist with foot placement. L armrest requires repairs/replacement.     --Reviewed OT recs from her visit on 11/23/2021  Teresita was not a  before but would like to start driving with hand control vehicle. This seems very reasonable. Gave her the number below to discuss with the team at [facility].       's Assessment and Training Program  Ph: [phone]  Fax: [phone]     --she  "needs a commode which seems medically necessary. Ordered \"Bariatric commode with extra wide seat\" [facility].     -------------------------------    Procedure: Chemodenervation to bilateral lower extremities utilizing onabotulinum toxin type A, Botox. I reviewed at length the risks benefits and potential time course for onset and duration. She agrees and signed the consent form today with her single initial.   present and answered all questions for this process. Total 400 units of Botox Lot # [id number] with Expiration Date: 2024 was utilized. NDC #0503-5891-67 Diluted with preservative-free normal saline ratio of 100 units per milliliter. This was divided into 4 syringes. 27-gauge recording EMG needle was utilized in conjunction with EMG guidance to identify the most active motor units while avoiding surrounding structures. All areas were cleansed with ChloraPrep. Injections are done in her WC in recline position. The following muscles were then injected.    Left le. Hamstring, 100 units divided over 2 sites  2. Gastrocnemius, 50 units divided over 2 sites  3. Adductors 50 units     Right le. Hamstring, 100 units divided over 2 sites  2. Gastrocnemius, 50 units divided over 2 sites  3. Adductors 50 units     Kathryn Prado MD  Physical Medicine & Rehabilitation    I spent a total of 20 minutes was spent face to face with the patient excluding the procedure.  Greater than 50% of the time was spent in counseling and coordinating care.   "

## 2022-04-19 ENCOUNTER — MEDICAL CORRESPONDENCE (OUTPATIENT)
Dept: HEALTH INFORMATION MANAGEMENT | Facility: CLINIC | Age: 37
End: 2022-04-19
Payer: COMMERCIAL

## 2022-06-22 ENCOUNTER — OFFICE VISIT (OUTPATIENT)
Dept: PHYSICAL MEDICINE AND REHAB | Facility: CLINIC | Age: 37
End: 2022-06-22
Payer: COMMERCIAL

## 2022-06-22 VITALS
HEART RATE: 84 BPM | TEMPERATURE: 97.6 F | DIASTOLIC BLOOD PRESSURE: 84 MMHG | RESPIRATION RATE: 16 BRPM | OXYGEN SATURATION: 96 % | SYSTOLIC BLOOD PRESSURE: 146 MMHG

## 2022-06-22 DIAGNOSIS — M81.8 OTHER OSTEOPOROSIS WITHOUT CURRENT PATHOLOGICAL FRACTURE: ICD-10-CM

## 2022-06-22 DIAGNOSIS — M62.838 MUSCLE SPASTICITY: ICD-10-CM

## 2022-06-22 DIAGNOSIS — G82.22 INCOMPLETE PARAPLEGIA (H): ICD-10-CM

## 2022-06-22 DIAGNOSIS — M62.838 SPASM OF MUSCLE: Primary | ICD-10-CM

## 2022-06-22 PROCEDURE — 95874 GUIDE NERV DESTR NEEDLE EMG: CPT | Performed by: PHYSICAL MEDICINE & REHABILITATION

## 2022-06-22 PROCEDURE — 64642 CHEMODENERV 1 EXTREMITY 1-4: CPT | Mod: 59 | Performed by: PHYSICAL MEDICINE & REHABILITATION

## 2022-06-22 PROCEDURE — 96372 THER/PROPH/DIAG INJ SC/IM: CPT | Performed by: PHYSICAL MEDICINE & REHABILITATION

## 2022-06-22 PROCEDURE — 64643 CHEMODENERV 1 EXTREM 1-4 EA: CPT | Mod: 59 | Performed by: PHYSICAL MEDICINE & REHABILITATION

## 2022-06-22 ASSESSMENT — PAIN SCALES - GENERAL: PAINLEVEL: NO PAIN (0)

## 2022-06-22 NOTE — PROGRESS NOTES
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 She is accompanied by her mother and brother today. She was followed by Dr. Dejesus in the past and was last seen in our clinic  12/2021.  Last seen in our clinic 3/21/22.    She is overall pleased with the results.  Denies any side effects. She believes the benefits started to wear off at 10 week time point; very similar to the previous injections and continues to receive benefit from the intervention.  Severity of spasticity is much improved with the botulinum toxin. Deformities and ROM have not changed as expected.     She has a power WC (new since 2018; vendor is [employer]). Does not use braces of any kind.     Medically, she has been stable with no ER visit or hospitalization. She is followed by her PCP regarding HTN, HLD and other medical issues.       Physical exam:  BP (!) 146/84   Pulse 84   Temp 97.6  F (36.4  C)   Resp 16   SpO2 96%      She was alert pleasant, sitting comfortably in power WC.   Unchanged -   Involuntary spasms are noted in her bilateral lower extremities. These exacerbate with attempted passive range of motion.  Modified Lorin 3/4 in adductor and KF muscles b/l, 4/4 versus fixed contracture in the bilateral ankles and toes with severe mid-foot / ankle plantar flexion inversion positioning.   Overlying skin in the thighs hamstrings is healthy without lesions or evidence of skin breakdown.    Assessment and recommendations:  She responds well to Botox injections for the management of spasticity and associated symptoms. Contractures and deformities in her feet/ankle are not expected to change with this procedure. No change in the total dose or sites of injections today.    Follow-up in 3 months time, will call sooner if any side effects or other concerns arise.        We have discussed other rehab needs several times in our clinic. We use  all the time but she doesn't seem to follow through with any of our recommendations.   --DEXA scan is still pending - gave her the number to schedule multiple times. Today, Carole helped her, called and scheduled on 7/7. Placed new orders for DEXA and lab and also scheduled f/u with Dr. Pascual on 7/27.  --PT was not started - will defer for now   --Had a visit in seating clinic with [facility] DPT, ATP 9/7/2021; recommendation as below. She has not received the equipments yet and has told me that they do no answer her calls. I just placed a new referral to the seating clinic today as she is close to 5 year kerline for a new WC and Carole helped them schedule the eval on 8/17.     Pt asked about foldable power wheelchair for travel - provided resource for [employer] and educated pt and brother about lack of insurance funding and need to pay out of pocket unless fully used in home. Also educated pt and brother about process for seeking repairs and modifications for pt's current power wc to address her mobility and function concerns. Pt requires a lap tray to allow her to write while attending [employer]. She also requires a padded foot box to accommodate her severe ankle contractures, prevent her feet from falling off the footplates (which also limits her mobility and ability to attend [employer]), and to protect against new incidences of skin breakdown in the future. Thigh guides should be considered for LE alignment to assist with foot placement. L armrest requires repairs/replacement.     --Reviewed OT recs from her visit on 11/23/2021 - this has also been pending.   Teresita was not a  before but would like to start driving with hand control vehicle. This seems very reasonable. Gave her the number below to discuss with the team at [facility].       's Assessment and Training Program  Ph: [phone]  Fax:  "[phone]     --Ordered \"Bariatric commode with extra wide seat\" and faxed to [email or web address] in Dec 2021.       -------------------------------    Procedure: Chemodenervation to bilateral lower extremities utilizing onabotulinum toxin type A, Botox. I reviewed at length the risks benefits and potential time course for onset and duration. She agrees and signed the consent form today with her single initial.   present and answered all questions for this process. Total 400 units of Botox Lot # [id number] with Expiration Date: 2023 was utilized. NDC #7223-1214-56 Diluted with preservative-free normal saline ratio of 100 units per milliliter. This was divided into 4 syringes. 27-gauge recording EMG needle was utilized in conjunction with EMG guidance to identify the most active motor units while avoiding surrounding structures. All areas were cleansed with ChloraPrep. Injections are done in her WC in recline position. The following muscles were then injected.    Left le. Hamstring, 100 units divided over 2 sites  2. Gastrocnemius, 50 units divided over 2 sites  3. Adductors 50 units     Right le. Hamstring, 100 units divided over 2 sites  2. Gastrocnemius, 50 units divided over 2 sites  3. Adductors 50 units     Kathryn Prado MD  Physical Medicine & Rehabilitation       I spent a total of 25 minutes was spent face to face with the patient excluding the procedure.  Greater than 50% of the time was spent in counseling and coordinating care.   "

## 2022-07-07 ENCOUNTER — ANCILLARY PROCEDURE (OUTPATIENT)
Dept: BONE DENSITY | Facility: CLINIC | Age: 37
End: 2022-07-07
Attending: PHYSICAL MEDICINE & REHABILITATION
Payer: COMMERCIAL

## 2022-07-07 ENCOUNTER — LAB (OUTPATIENT)
Dept: LAB | Facility: CLINIC | Age: 37
End: 2022-07-07
Payer: COMMERCIAL

## 2022-07-07 DIAGNOSIS — M62.838 SPASM OF MUSCLE: ICD-10-CM

## 2022-07-07 DIAGNOSIS — M81.8 OTHER OSTEOPOROSIS WITHOUT CURRENT PATHOLOGICAL FRACTURE: ICD-10-CM

## 2022-07-07 DIAGNOSIS — G82.22 INCOMPLETE PARAPLEGIA (H): ICD-10-CM

## 2022-07-07 DIAGNOSIS — M62.838 MUSCLE SPASTICITY: ICD-10-CM

## 2022-07-07 LAB
ALBUMIN SERPL-MCNC: 3.3 G/DL (ref 3.4–5)
ALP SERPL-CCNC: 106 U/L (ref 40–150)
ALT SERPL W P-5'-P-CCNC: 26 U/L (ref 0–50)
ANION GAP SERPL CALCULATED.3IONS-SCNC: 9 MMOL/L (ref 3–14)
AST SERPL W P-5'-P-CCNC: 19 U/L (ref 0–45)
BILIRUB SERPL-MCNC: 0.2 MG/DL (ref 0.2–1.3)
BUN SERPL-MCNC: 8 MG/DL (ref 7–30)
CALCIUM SERPL-MCNC: 8.8 MG/DL (ref 8.5–10.1)
CHLORIDE BLD-SCNC: 107 MMOL/L (ref 94–109)
CO2 SERPL-SCNC: 25 MMOL/L (ref 20–32)
CREAT SERPL-MCNC: 0.41 MG/DL (ref 0.52–1.04)
GFR SERPL CREATININE-BSD FRML MDRD: >90 ML/MIN/1.73M2
GLUCOSE BLD-MCNC: 103 MG/DL (ref 70–99)
POTASSIUM BLD-SCNC: 3.7 MMOL/L (ref 3.4–5.3)
PROT SERPL-MCNC: 7.5 G/DL (ref 6.8–8.8)
PTH-INTACT SERPL-MCNC: 70 PG/ML (ref 15–65)
SODIUM SERPL-SCNC: 141 MMOL/L (ref 133–144)

## 2022-07-07 PROCEDURE — 99000 SPECIMEN HANDLING OFFICE-LAB: CPT | Performed by: PATHOLOGY

## 2022-07-07 PROCEDURE — 80053 COMPREHEN METABOLIC PANEL: CPT | Performed by: PATHOLOGY

## 2022-07-07 PROCEDURE — 36415 COLL VENOUS BLD VENIPUNCTURE: CPT | Performed by: PATHOLOGY

## 2022-07-07 PROCEDURE — 83970 ASSAY OF PARATHORMONE: CPT | Mod: 90 | Performed by: PATHOLOGY

## 2022-07-07 PROCEDURE — 82306 VITAMIN D 25 HYDROXY: CPT | Mod: 90 | Performed by: PATHOLOGY

## 2022-07-07 PROCEDURE — 77080 DXA BONE DENSITY AXIAL: CPT | Performed by: INTERNAL MEDICINE

## 2022-07-08 LAB — DEPRECATED CALCIDIOL+CALCIFEROL SERPL-MC: 42 UG/L (ref 20–75)

## 2022-07-12 DIAGNOSIS — J31.0 CHRONIC RHINITIS: ICD-10-CM

## 2022-07-14 RX ORDER — CETIRIZINE HYDROCHLORIDE TABLETS 10 MG/1
TABLET, FILM COATED ORAL
Qty: 90 TABLET | Refills: 3 | Status: SHIPPED | OUTPATIENT
Start: 2022-07-14

## 2022-07-14 NOTE — TELEPHONE ENCOUNTER
"Requested Prescriptions   Pending Prescriptions Disp Refills     HM CETIRIZINE HCL 10 MG tablet [Pharmacy Med Name: HM CETIRIZINE HCL 10MG TABS] 90 tablet 11     Sig: TAKE ONE TABLET BY MOUTH EVERY MORNING       Antihistamines Protocol Passed - 7/12/2022 10:50 AM        Passed - Patient is 3-64 years of age     Apply weight-based dosing for peds patients age 3 - 12 years of age.    Forward request to provider for patients under the age of 3 or over the age of 64.          Passed - Recent (12 mo) or future (30 days) visit within the authorizing provider's specialty     Patient has had an office visit with the authorizing provider or a provider within the authorizing providers department within the previous 12 mos or has a future within next 30 days. See \"Patient Info\" tab in inbasket, or \"Choose Columns\" in Meds & Orders section of the refill encounter.              Passed - Medication is active on med list           Refilled per protocol.  Zee COCHRAN RN    "

## 2022-07-27 ENCOUNTER — VIRTUAL VISIT (OUTPATIENT)
Dept: PHYSICAL MEDICINE AND REHAB | Facility: CLINIC | Age: 37
End: 2022-07-27
Payer: COMMERCIAL

## 2022-07-27 VITALS — BODY MASS INDEX: 25.5 KG/M2 | WEIGHT: 158 LBS

## 2022-07-27 DIAGNOSIS — M81.8 OTHER OSTEOPOROSIS, UNSPECIFIED PATHOLOGICAL FRACTURE PRESENCE: Primary | ICD-10-CM

## 2022-07-27 PROCEDURE — 99213 OFFICE O/P EST LOW 20 MIN: CPT | Mod: 95 | Performed by: PHYSICAL MEDICINE & REHABILITATION

## 2022-07-27 ASSESSMENT — ANXIETY QUESTIONNAIRES
7. FEELING AFRAID AS IF SOMETHING AWFUL MIGHT HAPPEN: NOT AT ALL
8. IF YOU CHECKED OFF ANY PROBLEMS, HOW DIFFICULT HAVE THESE MADE IT FOR YOU TO DO YOUR WORK, TAKE CARE OF THINGS AT HOME, OR GET ALONG WITH OTHER PEOPLE?: NOT DIFFICULT AT ALL
2. NOT BEING ABLE TO STOP OR CONTROL WORRYING: NOT AT ALL
1. FEELING NERVOUS, ANXIOUS, OR ON EDGE: NOT AT ALL
GAD7 TOTAL SCORE: 0
GAD7 TOTAL SCORE: 0
4. TROUBLE RELAXING: NOT AT ALL
GAD7 TOTAL SCORE: 0
6. BECOMING EASILY ANNOYED OR IRRITABLE: NOT AT ALL
7. FEELING AFRAID AS IF SOMETHING AWFUL MIGHT HAPPEN: NOT AT ALL
5. BEING SO RESTLESS THAT IT IS HARD TO SIT STILL: NOT AT ALL
3. WORRYING TOO MUCH ABOUT DIFFERENT THINGS: NOT AT ALL
IF YOU CHECKED OFF ANY PROBLEMS ON THIS QUESTIONNAIRE, HOW DIFFICULT HAVE THESE PROBLEMS MADE IT FOR YOU TO DO YOUR WORK, TAKE CARE OF THINGS AT HOME, OR GET ALONG WITH OTHER PEOPLE: NOT DIFFICULT AT ALL

## 2022-07-27 ASSESSMENT — PAIN SCALES - GENERAL: PAINLEVEL: NO PAIN (0)

## 2022-07-27 ASSESSMENT — PATIENT HEALTH QUESTIONNAIRE - PHQ9
10. IF YOU CHECKED OFF ANY PROBLEMS, HOW DIFFICULT HAVE THESE PROBLEMS MADE IT FOR YOU TO DO YOUR WORK, TAKE CARE OF THINGS AT HOME, OR GET ALONG WITH OTHER PEOPLE: NOT DIFFICULT AT ALL
SUM OF ALL RESPONSES TO PHQ QUESTIONS 1-9: 0
SUM OF ALL RESPONSES TO PHQ QUESTIONS 1-9: 0

## 2022-07-27 NOTE — LETTER
7/27/2022       RE: Teresita Bang  [address]  [facility] [address]     Dear Colleague,    Thank you for referring your patient, Teresita Bang, to the [facility] PHYSICAL MEDICINE AND REHABILITATION CLINIC [place] at [facility]. Please see a copy of my visit note below.    Answers for HPI/ROS submitted by the patient on 7/27/2022  If you checked off any problems, how difficult have these problems made it for you to do your work, take care of things at home, or get along with other people?: Not difficult at all  PHQ9 TOTAL SCORE: 0  LUIS 7 TOTAL SCORE: 0    [facility]   PM&R clinic note        Interval history:     Teresita Bang presents to clinic today. She was last seen in my clinic 4/14/2021.  At that time I ordered DXA and this was recently completed (7/2022).  She reports no interval fracture or change in medications impacting bone health.  Lowest Z-score: -3.1 at the left total femur. We discussed contra-indications to anti-resorptives or osteoanabolics, including planned pregnancy in the coming year.  She is planning to get  in 2 months and therefore pregnancy is a possibility.      Fracture risk factors in disuse osteoporosis:  Age at injury less than 16:   Yes  Female gender:  Yes  Motor complete injury:  Yes  Paraplegia:  Yes  Duration of injury 10 years or more:  Yes   Alcohol use more than 5 units per day:  BMI less than 19: no (25)  Prior fragility fracture: No   Family history of fragility fracture: No    Medications:  Current Outpatient Medications   Medication Sig Dispense Refill     acetaminophen (TYLENOL) 325 MG tablet Take 2 tablets (650 mg) by mouth every 4 hours as needed for mild pain or fever 100 tablet 11     albuterol (PROAIR HFA/PROVENTIL HFA/VENTOLIN HFA) 108 (90 Base) MCG/ACT inhaler Inhale 1-2 puffs into the lungs every 4 hours as needed for shortness of breath /  dyspnea, wheezing or other (cough) 18 g 11     bisacodyl (DULCOLAX) 10 MG suppository Place 1 suppository (10 mg) rectally daily as needed for constipation 30 suppository 11     FLOVENT HFA 44 MCG/ACT inhaler Inhale 1 puff into the lungs 2 times daily 10.6 g 3     fluticasone (FLONASE) 50 MCG/ACT nasal spray Spray 1 spray into both nostrils 2 times daily as needed for rhinitis or allergies 16 g 11     HM CETIRIZINE HCL 10 MG tablet TAKE ONE TABLET BY MOUTH EVERY MORNING 90 tablet 3     hydrochlorothiazide (HYDRODIURIL) 12.5 MG tablet Take 1 tablet (12.5 mg) by mouth daily 90 tablet 0     ketotifen (ZADITOR) 0.025 % ophthalmic solution Place 1 drop into both eyes 2 times daily 10 mL 3     labetalol (NORMODYNE) 100 MG tablet Take 1 tablet by mouth 2 times daily       levothyroxine (SYNTHROID/LEVOTHROID) 112 MCG tablet Take 1 tablet (112 mcg) by mouth daily 90 tablet 1     Multiple Vitamins-Minerals (HAIR SKIN AND NAILS FORMULA PO) Take 1 tablet by mouth daily       order for DME Equipment being ordered: Digital home blood pressure monitor kit 1 Device 0     order for DME Equipment being ordered: Chux 300 pad 11     order for DME Equipment being ordered:  adult diapers for urinary incontinence 300 Units 11     vitamin D3 (CHOLECALCIFEROL) 50 mcg (2000 units) tablet TAKE TWO TABLETS BY MOUTH EVERY DAY 90 tablet 11              Physical Exam:   Wt 71.7 kg (158 lb)   BMI 25.50 kg/m    Gen: NAD, pleasant and cooperative       Labs/Imaging:     Ref Range & Units 2 wk ago   (7/7/22) 4 mo ago   (3/16/22) 10 mo ago   (9/29/21) 1 yr ago   (7/12/21) 3 yr ago   (6/26/19) 4 yr ago   (4/20/18) 4 yr ago   (2/19/18)     Vitamin D, Total (25-Hydroxy) 20 - 75 ug/L 42  30  28  30  18 Low  CM  25 CM  Canceled, Test credited CM    Resulting Agency  SCORE SCORE SCORE SCORE [facility]       Component Ref Range & Units  2 wk ago   (7/7/22) 4 mo ago   (3/16/22) 10 mo ago   (9/29/21) 1 yr ago   (7/12/21) 2 yr ago   (12/27/19) 2 yr ago   (7/31/19) 2 yr ago   (7/30/19)    Sodium 133 - 144 mmol/L 141  137  137  137  136  142  139     Potassium 3.4 - 5.3 mmol/L 3.7  3.5  3.4  4.3 CM  3.7  3.5  3.6     Chloride 94 - 109 mmol/L 107  107  109  104 R  104  110 High   105     Carbon Dioxide (CO2) 20 - 32 mmol/L 25  21  21  22  27  25  25     Anion Gap 3 - 14 mmol/L 9  9  7  11  5  7  9     Urea Nitrogen 7 - 30 mg/dL 8  7  10  8  8  4 Low   8     Creatinine 0.52 - 1.04 mg/dL 0.41 Low   0.42 Low   0.41 Low   0.55 R  0.44 Low   0.44 Low   0.43 Low      Calcium 8.5 - 10.1 mg/dL 8.8  8.8  8.7  9.2  9.1  7.7 Low   8.2 Low      Glucose 70 - 99 mg/dL 103 High   147 High   123 High   71  74  91  106 High      Alkaline Phosphatase 40 - 150 U/L 106  96  107  108        AST 0 - 45 U/L 19  20  14  36 CM   21  27     ALT 0 - 50 U/L 26  22  21  24 R   22  22     Protein Total 6.8 - 8.8 g/dL 7.5  7.3  7.5  7.8   6.8  7.6     Albumin 3.4 - 5.0 g/dL 3.3 Low   3.1 Low   3.3 Low   3.8   2.6 Low   2.9 Low      Bilirubin Total 0.2 - 1.3 mg/dL 0.2  0.2  0.2  0.2   0.3  0.2     GFR Estimate >60 mL/min/1.73m2 >90  >90 CM  >90 CM  >90 CM  >90 R, CM  >90 R, CM  >90 R, CM    Comment: Effective December 21, 2021 eGFRcr in adults is calculated using the 2021 CKD-EPI creatinine equation which includes age and gender (Raisa chavez al., NEJM, DOI: 10.1056/XFNWtw3075547)   [facility] LABORATORY - CORE LAB BLOX LAB BLOX LAB BLOX LAB [facility]             Component Ref Range & Units 2 wk ago     Parathyroid Hormone Intact 15 - 65 pg/mL 70 High     Resulting Agency  [facility]             Narrative              Lab Results   Component Value Date    WBC 7.4 03/16/2022    HGB 10.4 (L) 03/16/2022    HCT 33.5 (L) 03/16/2022    MCV 86 03/16/2022     03/16/2022     Lab Results   Component Value Date     07/07/2022    POTASSIUM 3.7  07/07/2022    CHLORIDE 107 07/07/2022    CO2 25 07/07/2022     (H) 07/07/2022     Lab Results   Component Value Date    GFRESTIMATED >90 07/07/2022    GFRESTBLACK >90 12/27/2019     Lab Results   Component Value Date    AST 19 07/07/2022    ALT 26 07/07/2022    ALKPHOS 106 07/07/2022    BILITOTAL 0.2 07/07/2022     No results found for: INR  Lab Results   Component Value Date    BUN 8 07/07/2022    CR 0.41 (L) 07/07/2022          Impression  Based on BMD diagnosis is consistent with low bone density based on WHO criteria Ref. 1     Results   Lumbar spine is excluded for degenerative changes and scoliosis     Left femoral neck  Z-score -2.4      Right femoral neck  Z-score -1.0      Left Total femur  Z-score -3.1 , BMD is 0.744 g/cm2.     Right total femur  Z-score -1.2, BMD is 0.982 g/cm2.     Radius Z-score -0.3, BMD 0.685 g/cm2.     Interval change  No prior study available for comparison     Fracture risk   Fracture risk calculation is not indicated. 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   However, pharmacological intervention is not indicated now as she is of child-bearing potential and will  in 2 months.  We discussed fracture risk and possible rehab based interventions to improve bone health.  I will refer her to PT for assessment of WB therapies.  RTC in 1 year to reassess or in the event of incident fracture.       Sweetie Pascual, DO    Physical Medicine & Rehabilitation      I spent a total of 16 minutes with Teresita Bang during today's telephone visit.     10 minutes spent on the date of the encounter doing chart review, history and exam, documentation and further activities as noted above

## 2022-07-27 NOTE — PROGRESS NOTES
Teresita is a 37 year old who is being evaluated via a billable video visit.      How would you like to obtain your AVS? Mail a copy  If the video visit is dropped, the invitation should be resent by: Text to cell phone: [phone]  Will anyone else be joining your video visit? No      Video-Visit Details    Type of service:  Video Visit    Originating Location (pt. Location): Home    Distant Location (provider location):  [facility] PHYSICAL MEDICINE AND REHABILITATION CLINIC [place]     Platform used for Video Visit: [employer]  Answers for HPI/ROS submitted by the patient on 7/27/2022  If you checked off any problems, how difficult have these problems made it for you to do your work, take care of things at home, or get along with other people?: Not difficult at all  PHQ9 TOTAL SCORE: 0  LUIS 7 TOTAL SCORE: 0    [facility]   PM&R clinic note        Interval history:     Teresita Bang presents to clinic today. She was last seen in my clinic 4/14/2021.  At that time I ordered DXA and this was recently completed (7/2022).  She reports no interval fracture or change in medications impacting bone health.  Lowest Z-score: -3.1 at the left total femur. We discussed contra-indications to anti-resorptives or osteoanabolics, including planned pregnancy in the coming year.  She is planning to get  in 2 months and therefore pregnancy is a possibility.      Fracture risk factors in disuse osteoporosis:  Age at injury less than 16:   Yes  Female gender:  Yes  Motor complete injury:  Yes  Paraplegia:  Yes  Duration of injury 10 years or more:  Yes   Alcohol use more than 5 units per day:  BMI less than 19: no (25)  Prior fragility fracture: No   Family history of fragility fracture: No    Medications:  Current Outpatient Medications   Medication Sig Dispense Refill     acetaminophen (TYLENOL) 325 MG tablet Take 2 tablets (650 mg) by mouth every 4 hours as needed for mild pain  or fever 100 tablet 11     albuterol (PROAIR HFA/PROVENTIL HFA/VENTOLIN HFA) 108 (90 Base) MCG/ACT inhaler Inhale 1-2 puffs into the lungs every 4 hours as needed for shortness of breath / dyspnea, wheezing or other (cough) 18 g 11     bisacodyl (DULCOLAX) 10 MG suppository Place 1 suppository (10 mg) rectally daily as needed for constipation 30 suppository 11     FLOVENT HFA 44 MCG/ACT inhaler Inhale 1 puff into the lungs 2 times daily 10.6 g 3     fluticasone (FLONASE) 50 MCG/ACT nasal spray Spray 1 spray into both nostrils 2 times daily as needed for rhinitis or allergies 16 g 11     HM CETIRIZINE HCL 10 MG tablet TAKE ONE TABLET BY MOUTH EVERY MORNING 90 tablet 3     hydrochlorothiazide (HYDRODIURIL) 12.5 MG tablet Take 1 tablet (12.5 mg) by mouth daily 90 tablet 0     ketotifen (ZADITOR) 0.025 % ophthalmic solution Place 1 drop into both eyes 2 times daily 10 mL 3     labetalol (NORMODYNE) 100 MG tablet Take 1 tablet by mouth 2 times daily       levothyroxine (SYNTHROID/LEVOTHROID) 112 MCG tablet Take 1 tablet (112 mcg) by mouth daily 90 tablet 1     Multiple Vitamins-Minerals (HAIR SKIN AND NAILS FORMULA PO) Take 1 tablet by mouth daily       order for DME Equipment being ordered: Digital home blood pressure monitor kit 1 Device 0     order for DME Equipment being ordered: Chux 300 pad 11     order for DME Equipment being ordered:  adult diapers for urinary incontinence 300 Units 11     vitamin D3 (CHOLECALCIFEROL) 50 mcg (2000 units) tablet TAKE TWO TABLETS BY MOUTH EVERY DAY 90 tablet 11              Physical Exam:   Wt 71.7 kg (158 lb)   BMI 25.50 kg/m    Gen: NAD, pleasant and cooperative       Labs/Imaging:     Ref Range & Units 2 wk ago   (7/7/22) 4 mo ago   (3/16/22) 10 mo ago   (9/29/21) 1 yr ago   (7/12/21) 3 yr ago   (6/26/19) 4 yr ago   (4/20/18) 4 yr ago   (2/19/18)     Vitamin D, Total (25-Hydroxy) 20 - 75 ug/L 42  30  28  30  18 Low  CM  25 CM  Canceled, Test credited CM    Resulting Agency   SCORE SCORE SCORE SCORE [facility]       Component Ref Range & Units 2 wk ago   (7/7/22) 4 mo ago   (3/16/22) 10 mo ago   (9/29/21) 1 yr ago   (7/12/21) 2 yr ago   (12/27/19) 2 yr ago   (7/31/19) 2 yr ago   (7/30/19)    Sodium 133 - 144 mmol/L 141  137  137  137  136  142  139     Potassium 3.4 - 5.3 mmol/L 3.7  3.5  3.4  4.3 CM  3.7  3.5  3.6     Chloride 94 - 109 mmol/L 107  107  109  104 R  104  110 High   105     Carbon Dioxide (CO2) 20 - 32 mmol/L 25  21  21  22  27  25  25     Anion Gap 3 - 14 mmol/L 9  9  7  11  5  7  9     Urea Nitrogen 7 - 30 mg/dL 8  7  10  8  8  4 Low   8     Creatinine 0.52 - 1.04 mg/dL 0.41 Low   0.42 Low   0.41 Low   0.55 R  0.44 Low   0.44 Low   0.43 Low      Calcium 8.5 - 10.1 mg/dL 8.8  8.8  8.7  9.2  9.1  7.7 Low   8.2 Low      Glucose 70 - 99 mg/dL 103 High   147 High   123 High   71  74  91  106 High      Alkaline Phosphatase 40 - 150 U/L 106  96  107  108        AST 0 - 45 U/L 19  20  14  36 CM   21  27     ALT 0 - 50 U/L 26  22  21  24 R   22  22     Protein Total 6.8 - 8.8 g/dL 7.5  7.3  7.5  7.8   6.8  7.6     Albumin 3.4 - 5.0 g/dL 3.3 Low   3.1 Low   3.3 Low   3.8   2.6 Low   2.9 Low      Bilirubin Total 0.2 - 1.3 mg/dL 0.2  0.2  0.2  0.2   0.3  0.2     GFR Estimate >60 mL/min/1.73m2 >90  >90 CM  >90 CM  >90 CM  >90 R, CM  >90 R, CM  >90 R, CM    Comment: Effective December 21, 2021 eGFRcr in adults is calculated using the 2021 CKD-EPI creatinine equation which includes age and gender ( et al., NEJM, DOI: 10.1056/RPTDqz1964943)   Resulting Agency  [facility] - CORE LAB BLOX LAB BLOX LAB BLOX LAB [facility]             Component Ref Range & Units 2 wk ago     Parathyroid Hormone Intact 15 - 65 pg/mL 70 High     Resulting Agency  [facility]             Narrative              Lab Results   Component Value Date    WBC  7.4 03/16/2022    HGB 10.4 (L) 03/16/2022    HCT 33.5 (L) 03/16/2022    MCV 86 03/16/2022     03/16/2022     Lab Results   Component Value Date     07/07/2022    POTASSIUM 3.7 07/07/2022    CHLORIDE 107 07/07/2022    CO2 25 07/07/2022     (H) 07/07/2022     Lab Results   Component Value Date    GFRESTIMATED >90 07/07/2022    GFRESTBLACK >90 12/27/2019     Lab Results   Component Value Date    AST 19 07/07/2022    ALT 26 07/07/2022    ALKPHOS 106 07/07/2022    BILITOTAL 0.2 07/07/2022     No results found for: INR  Lab Results   Component Value Date    BUN 8 07/07/2022    CR 0.41 (L) 07/07/2022          Impression  Based on BMD diagnosis is consistent with low bone density based on WHO criteria Ref. 1     Results   Lumbar spine is excluded for degenerative changes and scoliosis     Left femoral neck  Z-score -2.4      Right femoral neck  Z-score -1.0      Left Total femur  Z-score -3.1 , BMD is 0.744 g/cm2.     Right total femur  Z-score -1.2, BMD is 0.982 g/cm2.     Radius Z-score -0.3, BMD 0.685 g/cm2.     Interval change  No prior study available for comparison     Fracture risk   Fracture risk calculation is not indicated. Ref.4     Repeat  Recommend repeat DXA as per clinical indication     Principal result :  Leatha Kirk MD, [facility]  Division of Endocrinology and Diabetes    Department of Medicine         Assessment/Plan     This is a 38 YO female with disuse osteoporosis who is at high risk for fracture based on BMD by DXA 7/2022.   However, pharmacological intervention is not indicated now as she is of child-bearing potential and will  in 2 months.  We discussed fracture risk and possible rehab based interventions to improve bone health.  I will refer her to PT for assessment of WB therapies.  RTC in 1 year to reassess or in the event of incident fracture.       Sweetie Pascual, DO    Physical Medicine & Rehabilitation      I spent a total of 16 minutes with Teresita Bang  during today's telephone visit.     10 minutes spent on the date of the encounter doing chart review, history and exam, documentation and further activities as noted above

## 2022-08-01 ENCOUNTER — MEDICAL CORRESPONDENCE (OUTPATIENT)
Dept: HEALTH INFORMATION MANAGEMENT | Facility: CLINIC | Age: 37
End: 2022-08-01

## 2022-08-10 DIAGNOSIS — J45.30 MILD PERSISTENT REACTIVE AIRWAY DISEASE WITHOUT COMPLICATION: ICD-10-CM

## 2022-08-10 RX ORDER — ALBUTEROL SULFATE 90 UG/1
1-2 AEROSOL, METERED RESPIRATORY (INHALATION) EVERY 4 HOURS PRN
Qty: 18 G | Refills: 0 | Status: SHIPPED | OUTPATIENT
Start: 2022-08-10 | End: 2022-09-28

## 2022-08-10 NOTE — TELEPHONE ENCOUNTER
"Requested Prescriptions   Pending Prescriptions Disp Refills     albuterol (PROAIR HFA/PROVENTIL HFA/VENTOLIN HFA) 108 (90 Base) MCG/ACT inhaler 18 g 11     Sig: Inhale 1-2 puffs into the lungs every 4 hours as needed for shortness of breath / dyspnea, wheezing or other (cough)       Asthma Maintenance Inhalers - Anticholinergics Failed - 8/10/2022  8:40 AM        Failed - Asthma control assessment score within normal limits in last 6 months     Please review ACT score.           Passed - Patient is age 12 years or older        Passed - Medication is active on med list        Passed - Recent (6 mo) or future (30 days) visit within the authorizing provider's specialty     Patient had office visit in the last 6 months or has a visit in the next 30 days with authorizing provider or within the authorizing provider's specialty.  See \"Patient Info\" tab in inbasket, or \"Choose Columns\" in Meds & Orders section of the refill encounter.           Short-Acting Beta Agonist Inhalers Protocol  Failed - 8/10/2022  8:40 AM        Failed - Asthma control assessment score within normal limits in last 6 months     Please review ACT score.           Passed - Patient is age 12 or older        Passed - Medication is active on med list        Passed - Recent (6 mo) or future (30 days) visit within the authorizing provider's specialty     Patient had office visit in the last 6 months or has a visit in the next 30 days with authorizing provider or within the authorizing provider's specialty.  See \"Patient Info\" tab in inbasket, or \"Choose Columns\" in Meds & Orders section of the refill encounter.               3-16-22  Mild persistent reactive airway disease without complication  Patient has been confused regarding which inhaler to take when. Instructions given to use Flovent inhaler every day as ordered. Discussed how this is a preventative, maintenance medication. Instructions given to use albuterol inhaler as needed for shortness of " breath. Discussed how these medications work in different ways and can be taken together if needed. Patient verbalized understanding through .  - FLOVENT HFA 44 MCG/ACT inhaler  Dispense: 10.6 g; Refill: 3     Return in about 3 months (around 6/16/2022) for Follow up.    TC please call pt to schedule follow up appt, refill sent to get to appt.     Zee COCHRAN RN

## 2022-08-16 ENCOUNTER — OFFICE VISIT (OUTPATIENT)
Dept: FAMILY MEDICINE | Facility: CLINIC | Age: 37
End: 2022-08-16
Payer: COMMERCIAL

## 2022-08-16 VITALS
OXYGEN SATURATION: 99 % | HEART RATE: 80 BPM | DIASTOLIC BLOOD PRESSURE: 82 MMHG | SYSTOLIC BLOOD PRESSURE: 169 MMHG | TEMPERATURE: 97 F

## 2022-08-16 DIAGNOSIS — G82.20 PARAPLEGIA (H): ICD-10-CM

## 2022-08-16 DIAGNOSIS — N31.9 NEUROGENIC BLADDER: ICD-10-CM

## 2022-08-16 DIAGNOSIS — E03.9 ACQUIRED HYPOTHYROIDISM: ICD-10-CM

## 2022-08-16 DIAGNOSIS — I10 PRIMARY HYPERTENSION: Primary | ICD-10-CM

## 2022-08-16 DIAGNOSIS — J45.30 MILD PERSISTENT REACTIVE AIRWAY DISEASE WITHOUT COMPLICATION: ICD-10-CM

## 2022-08-16 PROCEDURE — 99214 OFFICE O/P EST MOD 30 MIN: CPT | Performed by: NURSE PRACTITIONER

## 2022-08-16 PROCEDURE — 84443 ASSAY THYROID STIM HORMONE: CPT | Performed by: NURSE PRACTITIONER

## 2022-08-16 PROCEDURE — 80048 BASIC METABOLIC PNL TOTAL CA: CPT | Performed by: NURSE PRACTITIONER

## 2022-08-16 PROCEDURE — 36415 COLL VENOUS BLD VENIPUNCTURE: CPT | Performed by: NURSE PRACTITIONER

## 2022-08-16 RX ORDER — HYDROCHLOROTHIAZIDE 25 MG/1
25 TABLET ORAL DAILY
Qty: 90 TABLET | Refills: 1 | Status: SHIPPED | OUTPATIENT
Start: 2022-08-16 | End: 2022-09-28

## 2022-08-16 ASSESSMENT — PAIN SCALES - GENERAL: PAINLEVEL: NO PAIN (0)

## 2022-08-16 ASSESSMENT — ASTHMA QUESTIONNAIRES: ACT_TOTALSCORE: 20

## 2022-08-16 NOTE — PATIENT INSTRUCTIONS
[facility] Clinic:     [address] [place] [id number]    [phone]    You will get a phone call with the lab results and to discuss the catheter for travel and back.

## 2022-08-16 NOTE — LETTER
[facility]  [address]  [address]  [facility] [address]  Phone: [phone]  Fax: [phone]    August 16, 2022        Teresita Bang  [address]  [facility] [address]          To whom it may concern:    RE: Teresita Bang    Patient was seen and treated today at our clinic and is under our care.     She has a neurogenic bladder due to paraplegia, and will need an indwelling urinary catheter placed for travel back home to the [place] please.     Please contact me our clinic for any questions or concerns.      Sincerely,        MARIELLE Suazo CNP

## 2022-08-16 NOTE — PROGRESS NOTES
"  Assessment & Plan     Better  now # [id number]      ICD-10-CM    1. Primary hypertension  I10 hydrochlorothiazide (HYDRODIURIL) 25 MG tablet     Basic metabolic panel  (Ca, Cl, CO2, Creat, Gluc, K, Na, BUN)   2. Acquired hypothyroidism  E03.9 TSH with free T4 reflex     Basic metabolic panel  (Ca, Cl, CO2, Creat, Gluc, K, Na, BUN)   3. Paraplegia (H)  G82.20    4. Neurogenic bladder  N31.9    5. Mild persistent reactive airway disease without complication  J45.30     asthma well controlled and recent illness recovered from well  Thyroid seems to be stable on current dose, due for lab work today, no symptoms although pt difficult historian and , appointment at least every 2-3 months recommended    Appreciate ongoing MTM support and encouraged her to bring in medications as it is very helpful, pt starting to get to know her meds, has Homecare  Folowing with PT and OT, seated clinic and continue, bring any orders to Provider there in charge of the wheelchair and she verbalizes understanding today  Contact CM from [facility] if any questions or anything needed from us here    Due for lab work and will follow up with results visit if possible, will need new Primary Care Provider as we discussed I am leaving this clinic. Notify if needs help scheduling with a new Primary Care Provider       50 minutes spent on the date of the encounter doing chart review, history and exam, documentation and further activities per the note       BMI:   Estimated body mass index is 25.5 kg/m  as calculated from the following:    Height as of 9/29/21: 1.676 m (5' 6\").    Weight as of 7/27/22: 71.7 kg (158 lb).       Regular exercise    Return for for follow up and to establish care with new PCP.    MARIELLE Suazo CNP  [facility]    Shaila Lo is a 37 year old, presenting for the following health issues:  Asthma      HPI     Asthma Follow-Up    Was ACT completed today?    Yes    ACT " "Total Scores 8/16/2022   ACT TOTAL SCORE (Goal Greater than or Equal to 20) 20   In the past 12 months, how many times did you visit the emergency room for your asthma without being admitted to the hospital? 0   In the past 12 months, how many times were you hospitalized overnight because of your asthma? 0          How many days per week do you miss taking your asthma controller medication?  0    Please describe any recent triggers for your asthma: None    Have you had any Emergency Room Visits, Urgent Care Visits, or Hospital Admissions since your last office visit?  No      How many servings of fruits and vegetables do you eat daily?  2-3    On average, how many sweetened beverages do you drink each day (Examples: soda, juice, sweet tea, etc.  Do NOT count diet or artificially sweetened beverages)?   2    How many days per week do you exercise enough to make your heart beat faster? 3 or less    How many minutes a day do you exercise enough to make your heart beat faster? 60 or more    How many days per week do you miss taking your medication? 0    Hypertension Follow-up      Do you check your blood pressure regularly outside of the clinic? Yes     Pt difficult historian    145/75 before she left home today and sometimes it's lower, no log today for me to view  ??   \"When I'm washing or going somewhere it is higher, today 169/82\"  Checking every morning      [place] ?  [facility]    Review of Systems   Constitutional, HEENT, cardiovascular, pulmonary, GI, , musculoskeletal, neuro, skin, endocrine and psych systems are negative, except as otherwise noted.      Objective    BP (!) 169/82 (BP Location: Left arm, Patient Position: Sitting, Cuff Size: Adult Regular)   Pulse 80   Temp 97  F (36.1  C) (Temporal)   SpO2 99%   There is no height or weight on file to calculate BMI.  Physical Exam                       .  ..  "

## 2022-08-17 ENCOUNTER — HOSPITAL ENCOUNTER (OUTPATIENT)
Dept: OCCUPATIONAL THERAPY | Facility: CLINIC | Age: 37
Setting detail: THERAPIES SERIES
Discharge: HOME | End: 2022-08-17
Attending: PHYSICAL MEDICINE & REHABILITATION
Payer: COMMERCIAL

## 2022-08-17 DIAGNOSIS — M62.838 SPASM OF MUSCLE: ICD-10-CM

## 2022-08-17 DIAGNOSIS — G82.22 INCOMPLETE PARAPLEGIA (H): ICD-10-CM

## 2022-08-17 DIAGNOSIS — M62.838 MUSCLE SPASTICITY: ICD-10-CM

## 2022-08-17 LAB
ANION GAP SERPL CALCULATED.3IONS-SCNC: 11 MMOL/L (ref 3–14)
BUN SERPL-MCNC: 10 MG/DL (ref 7–30)
CALCIUM SERPL-MCNC: 9.1 MG/DL (ref 8.5–10.1)
CHLORIDE BLD-SCNC: 99 MMOL/L (ref 94–109)
CO2 SERPL-SCNC: 22 MMOL/L (ref 20–32)
CREAT SERPL-MCNC: 0.38 MG/DL (ref 0.52–1.04)
GFR SERPL CREATININE-BSD FRML MDRD: >90 ML/MIN/1.73M2
GLUCOSE BLD-MCNC: 100 MG/DL (ref 70–99)
POTASSIUM BLD-SCNC: 4 MMOL/L (ref 3.4–5.3)
SODIUM SERPL-SCNC: 132 MMOL/L (ref 133–144)
TSH SERPL DL<=0.005 MIU/L-ACNC: 0.62 MU/L (ref 0.4–4)

## 2022-08-17 PROCEDURE — 97542 WHEELCHAIR MNGMENT TRAINING: CPT | Mod: GO | Performed by: OCCUPATIONAL THERAPIST

## 2022-08-17 NOTE — PROGRESS NOTES
"SEATING AND WHEELED MOBILITY ASSESSMENT     08/17/22 1100   Quick Adds   Quick Adds Current Power Wheelchair       Present Yes   Language Afghan   General Information (PT: include personal factors and/or comorbidities that impact the POC; OT: include additional occupational profile info)   Rehab Discipline OT   Funding [facility]   Service Outpatient;Occupational Therapy;Seating/Wheeled Mobility Evaluation   Height 5'6\"   Weight 158   Start Of Care Date 08/17/22   Referring Physician Kathryn Prado   Orders Evaluate And Treat As Indicated;Per Therapist Evaluation   Orders Date 06/22/22   Others Present at Evaluation  via phone   Patient/Caregiver Goals power w/c mods   Rehabilitation Technology Supplier [employer] [facility]   Current Community Support Personal Care Attendant;Family/Friend Caregiver;Transportation Service   Patient role/Employment history Disabled   Medical History   Onset Of Illness/injury Or Date Of Surgery 6/22/22   Medical Diagnosis Incomplete paraplegia   Medical History spasticity   Current Power Wheelchair   Age 2020    Quantum Q6 edge   Cushion Gel   Back Solid Curved   Footrest Style power center mount   Headrest Yes   Settings Used Home;Outdoor Community Mobility;Primary Means of Transportation   Condition Fair   Positioning Features Tilt Seat;Recline Back;Power Elevating Leg Rests   Power Control Right Joystick   Power Wheelchair Comments WasSignificant amount of confusion of prior work done, continued needs doing what.  Communicated with patient  and  today to determine that [employer] is working on a backup manual chair and provided a private pay paulina power folding wheelchair for patient.  Max has made a custom full box and lap tray for patient that she already has.  She now requires bilateral large thigh guards with removal hardware in order to help with hip abduction due to weak lower extremities as well " as her armrest pads need to be replaced to damage.   Home Accessibility   Living Environment Apartment/condo   Community ADL   Transportation Transportation Services   Balance   Unsupported Sitting Balance Uses Upper Extremities for Balance   Sitting Balance in Chair Uses Upper Extremities for Balance   Standing Balance Physical Assist Required   Ambulation   Ambulation Non Ambulatory   Transfers   Transfer Assist Moderate Assist   Education Assessment   Barriers to Learning Physical;Language   Preferred Learning Style Listening;Demonstration;Pictures/Video   Assessment/Plan   Criteria for Skilled Interventions Met Yes, Treatment Indicated   Treatment Diagnosis Impaired participation in MR ADLs   Therapy Frequency Once   Planned Therapy Interventions Wheelchair Management/Propulsion Training   Planned Therapy Interventions Comments Per above determined wheelchair needs and cleared confusion this therapist to assist with thigh guards and armrests other needs being addressed by Simin Jacinto   Risks and benefits of treatment have been explained Yes   Patient/family & other staff in agreement with plan of care Yes   Comments Handi contacted   Session Time   OT Wheelchair Management Minutes (51842) 40   GOALS   Goals Patient/family demontrastes understanding of equipment for reducing/accommodating postural deviations, including benefits/limitations   Adult OT Eval Goals   OT Eval Goals (Adult) 1    OT Goal 1   Goal Identifier w/c mods   Goal Description Patient/family demontrastes understanding of equipment for reducing/accommodating postural deviations, including benefits/limitations;   Goal Progress to be done with handi   Target Date 08/17/22   Date Met 08/17/22   Bialteral Thigh Guides 4 X 8 pads - to hold thighs straight and not splay out to the side    2 Removable brackets -to be able to remove the pads for transfers     This equipment is reasonable and necessary with reference to accepted standards of medical  practice and treatment of this patient's condition and is not being recommended as a convenience item. Without this recommended equipment, she is highly likely to sustain injuries from falls, develop pressure sores or postural compensation, and/or be bed confined, which those costs far exceed the cost of the requested equipment.  This therapist has no financial connection to the equipment being requested or vendor being used.    Electronically signed by:  CHEVY Cortes      Occupational Therapist, Assistive   [phone]      fax: [phone]      [email or web address] [address]  August 17, 2022    I have read and concur with the above recommendations.    Physician Printed Name __________________________________________    Physician Signature  _____________________________________________    Date of Signature ______________________________    Physician Phone  ______________________________

## 2022-08-29 NOTE — PROGRESS NOTES
Regarding order for bariatric shower chair in visit of Dec 2021, please refer to the telephone encounter of 1/31/22.  The encounter was pended with an incomplete note and did not appear in chart review until note completed / resolved today.    Teresita is not eligible for bariatric items based on her height and weight.  [facility] issued her a shower chair with extra wide (24 inch) seat in 2019 so she is not eligible for new shower chair until 2024.  Carole Mccrary RN on 8/29/2022 at 4:11 PM
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
## 2022-09-07 ENCOUNTER — HOSPITAL ENCOUNTER (OUTPATIENT)
Dept: PHYSICAL THERAPY | Facility: CLINIC | Age: 37
Setting detail: THERAPIES SERIES
Discharge: HOME | End: 2022-09-07
Attending: PHYSICAL MEDICINE & REHABILITATION
Payer: COMMERCIAL

## 2022-09-07 DIAGNOSIS — M81.8 OTHER OSTEOPOROSIS, UNSPECIFIED PATHOLOGICAL FRACTURE PRESENCE: ICD-10-CM

## 2022-09-07 PROCEDURE — 97161 PT EVAL LOW COMPLEX 20 MIN: CPT | Mod: GP | Performed by: PHYSICAL THERAPIST

## 2022-09-07 PROCEDURE — 97530 THERAPEUTIC ACTIVITIES: CPT | Mod: GP | Performed by: PHYSICAL THERAPIST
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
## 2022-09-20 ENCOUNTER — HOSPITAL ENCOUNTER (OUTPATIENT)
Dept: PHYSICAL THERAPY | Facility: CLINIC | Age: 37
Setting detail: THERAPIES SERIES
Discharge: HOME | End: 2022-09-20
Attending: PHYSICAL MEDICINE & REHABILITATION
Payer: COMMERCIAL

## 2022-09-20 PROCEDURE — 97530 THERAPEUTIC ACTIVITIES: CPT | Mod: GP

## 2022-09-25 DIAGNOSIS — E03.9 ACQUIRED HYPOTHYROIDISM: ICD-10-CM

## 2022-09-26 ENCOUNTER — TELEPHONE (OUTPATIENT)
Dept: FAMILY MEDICINE | Facility: CLINIC | Age: 37
End: 2022-09-26

## 2022-09-26 DIAGNOSIS — N31.9 NEUROGENIC BLADDER: ICD-10-CM

## 2022-09-26 DIAGNOSIS — G81.10 SPASTIC HEMIPLEGIA, UNSPECIFIED ETIOLOGY, UNSPECIFIED LATERALITY (H): Primary | ICD-10-CM

## 2022-09-26 RX ORDER — LEVOTHYROXINE SODIUM 112 UG/1
112 TABLET ORAL DAILY
Qty: 90 TABLET | Refills: 1 | Status: SHIPPED | OUTPATIENT
Start: 2022-09-26 | End: 2022-09-28

## 2022-09-26 NOTE — TELEPHONE ENCOUNTER
Reason for call:  Other   Patient called regarding (reason for call): appointment  Additional comments: PT is traveling on 10/03/2022 and wants a catheder placed since she is parapalegic. Pt ONLY wants to see a Female provider at [facility] on 10/03/2022. Pt also needs all medications refilled for  Months supply. Can call pt at [phone] but will need a [facility] . Or call her  at [phone] and the  will call her to get on the line. Pt states she needs the medication refilled ASAP as well.     Phone number to reach patient:  Cell number on file:    Telephone Information:   Mobile [phone]       Best Time:  Anytime    Can we leave a detailed message on this number?  YES    Travel screening: Not Applicable

## 2022-09-26 NOTE — TELEPHONE ENCOUNTER
Looks like this was discussed with previous PCP. Could the order be placed or will she need an appointment.     No current openings in time frame requested

## 2022-09-26 NOTE — TELEPHONE ENCOUNTER
Spoke to patient through sister (interpreted)   Patient is traveling to [facility] on 10/3 for 4 months and is requesting:     - Catheter placement prior to travel (long flight)  - All prescriptions filled for 3 -4 months (or at least albuterol, flovent, flonase,  bisacodyl, hydrochlorothiazide, HM Cetirizine    No availability to see a provider here at [place] prior to 10/3, routed to central scheduling for pt to find an appointment anywhere in system.     Informed patient we do not place or replace catheters and she would have to go through urology. She said she would try but it is an urgent need right now - she leaves Monday. Informed patient we unfortunately don't place here in clinic. She understood and will try another clinic      Jacki Kern RN  [facility]

## 2022-09-26 NOTE — TELEPHONE ENCOUNTER
Please call her back  1) we do not replace catheters, usually this is done through urology  2) I can not figure  what appt 10/3 that she is asking about  3) since she only wants to see female providers she needs to transfer clinic as all of our female providers have left  4) she needs to contact her pharmacy and request refills ( that is the standard protocol)

## 2022-09-27 ENCOUNTER — OFFICE VISIT (OUTPATIENT)
Dept: PHYSICAL MEDICINE AND REHAB | Facility: CLINIC | Age: 37
End: 2022-09-27
Payer: COMMERCIAL

## 2022-09-27 ENCOUNTER — TELEPHONE (OUTPATIENT)
Dept: UROLOGY | Facility: CLINIC | Age: 37
End: 2022-09-27

## 2022-09-27 VITALS — DIASTOLIC BLOOD PRESSURE: 91 MMHG | SYSTOLIC BLOOD PRESSURE: 157 MMHG | OXYGEN SATURATION: 98 % | HEART RATE: 74 BPM

## 2022-09-27 DIAGNOSIS — M62.838 MUSCLE SPASTICITY: ICD-10-CM

## 2022-09-27 DIAGNOSIS — M62.838 SPASM OF MUSCLE: Primary | ICD-10-CM

## 2022-09-27 DIAGNOSIS — G82.22 INCOMPLETE PARAPLEGIA (H): ICD-10-CM

## 2022-09-27 PROCEDURE — 95874 GUIDE NERV DESTR NEEDLE EMG: CPT | Performed by: PHYSICAL MEDICINE & REHABILITATION

## 2022-09-27 PROCEDURE — 64642 CHEMODENERV 1 EXTREMITY 1-4: CPT | Mod: LT | Performed by: PHYSICAL MEDICINE & REHABILITATION

## 2022-09-27 PROCEDURE — 64643 CHEMODENERV 1 EXTREM 1-4 EA: CPT | Mod: RT | Performed by: PHYSICAL MEDICINE & REHABILITATION

## 2022-09-27 NOTE — LETTER
9/27/2022       RE: Teresita Bang  [address]  [place] [address]     Dear Colleague,    Thank you for referring your patient, Teresita Bang, to the [facility] [place] at [facility]. Please see a copy of my visit note below.    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 She is accompanied by her mother and brother today. She was followed by Dr. Dejesus in the past and was last seen in our clinic  12/2021.  Last seen in our clinic 6/22/22.    Background   --Had a visit in seating clinic with [facility] DPT, [facility] 9/7/2021; recommendation as below. She has not received the equipments yet and has told me that they do no answer her calls. I just placed a new referral to the seating clinic today as she is close to 5 year kerline for a new WC and Carole helped them schedule the eval on 8/17.     Pt asked about foldable power wheelchair for travel - provided resource for [email or web address] and educated pt and brother about lack of insurance funding and need to pay out of pocket unless fully used in home. Also educated pt and brother about process for seeking repairs and modifications for pt's current power wc to address her mobility and function concerns. Pt requires a lap tray to allow her to write while attending [employer]. She also requires a padded foot box to accommodate her severe ankle contractures, prevent her feet from falling off the footplates (which also limits her mobility and ability to attend [employer]), and to protect against new incidences of skin breakdown in the future. Thigh guides should be considered for LE alignment to assist with foot placement. L armrest requires repairs/replacement.     --Reviewed OT recs from  "her visit on 11/23/2021 - this has also been pending.   Teresita was not a  before but would like to start driving with hand control vehicle. This seems very reasonable. Gave her the number below to discuss with the team at [facility].       's Assessment and Training Program  Ph: [phone]  Fax: [phone]     --Ordered \"Bariatric commode with extra wide seat\" and faxed to [facility] in Dec 2021.       Interval history   She is overall pleased with the results.  Denies any side effects. She believes the benefits started to wear off at 10 week time point; very similar to the previous injections and continues to receive benefit from the intervention.  Severity of spasticity is much improved with the botulinum toxin. Deformities and ROM have not changed as expected.     She has a power WC (new since 2018; vendor is [facility]). Does not use braces of any kind.     Medically, she has been stable with no ER visit or hospitalization. She is followed by her PCP regarding HTN, HLD and other medical issues.     Had a visit with  7/27- per her note; \"at high risk for fracture based on BMD by DXA 7/2022.   However, pharmacological intervention is not indicated now as she is of child-bearing potential and will  in 2 months. RTC in 1 year to reassess or in the event of incident fracture. \"    Saw Lin in seating clinic 8/17 - \"Was significant amount of confusion of prior work done, continued needs doing what.  Communicated with patient  and  today to determine that [facility] is working on a backup manual chair and provided a private pay paulina power folding wheelchair for patient.  Max has made a custom full box and lap tray for patient that she already has.  She now requires bilateral large thigh guards with removal hardware in order to help with hip abduction due to weak lower extremities as well as her armrest pads need to be replaced to damage.\"    Physical " exam:  BP (!) 157/91 (BP Location: Left arm, Patient Position: Sitting, Cuff Size: Adult Large)   Pulse 74   SpO2 98%      She was alert pleasant, sitting comfortably in power WC.   Unchanged -   Involuntary spasms are noted in her bilateral lower extremities. These exacerbate with attempted passive range of motion.  Modified Lorin 3/4 in adductor and KF muscles b/l, 4/4 versus fixed contracture in the bilateral ankles and toes with severe mid-foot / ankle plantar flexion inversion positioning.   Overlying skin in the thighs hamstrings is healthy without lesions or evidence of skin breakdown.    Assessment and recommendations:  She responds well to Botox injections for the management of spasticity and associated symptoms. Contractures and deformities in her feet/ankle are not expected to change with this procedure. No change in the total dose or sites of injections today.    Follow-up in 3 months time, will call sooner if any side effects or other concerns arise.       -------  Procedure: Chemodenervation to bilateral lower extremities utilizing onabotulinum toxin type A, Botox. I reviewed at length the risks benefits and potential time course for onset and duration. She agrees and signed the consent form today with her single initial.   present and answered all questions for this process. Total 400 units of Botox Lot # [id number] with Expiration Date: 2024 was utilized. NDC #8017-5739-92 Diluted with preservative-free normal saline ratio of 100 units per milliliter. This was divided into 4 syringes. 27-gauge recording EMG needle was utilized in conjunction with EMG guidance to identify the most active motor units while avoiding surrounding structures. All areas were cleansed with ChloraPrep. Injections are done in her  in recline position. The following muscles were then injected.    Left le. Hamstring, 100 units divided over 2 sites  2. Gastrocnemius, 50 units divided over 2 sites  3.  Adductors 50 units     Right le. Hamstring, 100 units divided over 2 sites  2. Gastrocnemius, 50 units divided over 2 sites  3. Adductors 50 units           Again, thank you for allowing me to participate in the care of your patient.      Sincerely,    Kathryn Prado MD

## 2022-09-27 NOTE — TELEPHONE ENCOUNTER
[facility] Call Center    Phone Message    May a detailed message be left on voicemail: yes     Reason for Call: Other: Patient is being referred to Urology for hypogenic bladder, dx is not on guidelines, please triage and call patient to schedule      Action Taken: Message routed to:  [facility] Urology     Travel Screening: Not Applicable

## 2022-09-27 NOTE — NURSING NOTE
Chief Complaint   Patient presents with     RECHECK     Botox injections confirmed with patient     Fina Shankar CMA at 11:16 AM on 9/27/2022.

## 2022-09-28 ENCOUNTER — PATIENT OUTREACH (OUTPATIENT)
Dept: CARE COORDINATION | Facility: CLINIC | Age: 37
End: 2022-09-28

## 2022-09-28 ENCOUNTER — HOSPITAL ENCOUNTER (OUTPATIENT)
Dept: PHYSICAL THERAPY | Facility: CLINIC | Age: 37
Setting detail: THERAPIES SERIES
Discharge: HOME | End: 2022-09-28
Attending: PHYSICAL MEDICINE & REHABILITATION
Payer: COMMERCIAL

## 2022-09-28 ENCOUNTER — OFFICE VISIT (OUTPATIENT)
Dept: FAMILY MEDICINE | Facility: CLINIC | Age: 37
End: 2022-09-28
Payer: COMMERCIAL

## 2022-09-28 VITALS
DIASTOLIC BLOOD PRESSURE: 84 MMHG | HEART RATE: 67 BPM | OXYGEN SATURATION: 98 % | TEMPERATURE: 98.2 F | SYSTOLIC BLOOD PRESSURE: 146 MMHG | RESPIRATION RATE: 16 BRPM

## 2022-09-28 DIAGNOSIS — J45.30 MILD PERSISTENT REACTIVE AIRWAY DISEASE WITHOUT COMPLICATION: ICD-10-CM

## 2022-09-28 DIAGNOSIS — Z23 NEED FOR PROPHYLACTIC VACCINATION AND INOCULATION AGAINST INFLUENZA: ICD-10-CM

## 2022-09-28 DIAGNOSIS — E03.9 ACQUIRED HYPOTHYROIDISM: ICD-10-CM

## 2022-09-28 DIAGNOSIS — I10 HYPERTENSION GOAL BP (BLOOD PRESSURE) < 140/90: ICD-10-CM

## 2022-09-28 DIAGNOSIS — N31.9 NEUROGENIC BLADDER: ICD-10-CM

## 2022-09-28 DIAGNOSIS — G82.20 PARAPLEGIA (H): Primary | ICD-10-CM

## 2022-09-28 DIAGNOSIS — K59.09 OTHER CONSTIPATION: ICD-10-CM

## 2022-09-28 DIAGNOSIS — G81.10 SPASTIC HEMIPLEGIA, UNSPECIFIED ETIOLOGY, UNSPECIFIED LATERALITY (H): ICD-10-CM

## 2022-09-28 PROCEDURE — 99215 OFFICE O/P EST HI 40 MIN: CPT | Mod: 25 | Performed by: PHYSICIAN ASSISTANT

## 2022-09-28 PROCEDURE — 90471 IMMUNIZATION ADMIN: CPT | Performed by: PHYSICIAN ASSISTANT

## 2022-09-28 PROCEDURE — 97530 THERAPEUTIC ACTIVITIES: CPT | Mod: GP | Performed by: PHYSICAL THERAPIST

## 2022-09-28 PROCEDURE — 90686 IIV4 VACC NO PRSV 0.5 ML IM: CPT | Performed by: PHYSICIAN ASSISTANT

## 2022-09-28 PROCEDURE — 97110 THERAPEUTIC EXERCISES: CPT | Mod: GP | Performed by: PHYSICAL THERAPIST

## 2022-09-28 RX ORDER — LABETALOL 100 MG/1
100 TABLET, FILM COATED ORAL 2 TIMES DAILY
Qty: 180 TABLET | Refills: 0 | Status: SHIPPED | OUTPATIENT
Start: 2022-09-28

## 2022-09-28 RX ORDER — BISACODYL 10 MG
10 SUPPOSITORY, RECTAL RECTAL DAILY PRN
Qty: 30 SUPPOSITORY | Refills: 11 | Status: SHIPPED | OUTPATIENT
Start: 2022-09-28

## 2022-09-28 RX ORDER — LISINOPRIL 10 MG/1
10 TABLET ORAL DAILY
Status: CANCELLED | OUTPATIENT
Start: 2022-09-28

## 2022-09-28 RX ORDER — LEVOTHYROXINE SODIUM 112 UG/1
112 TABLET ORAL DAILY
Qty: 90 TABLET | Refills: 0 | Status: SHIPPED | OUTPATIENT
Start: 2022-09-28

## 2022-09-28 RX ORDER — LISINOPRIL AND HYDROCHLOROTHIAZIDE 12.5; 2 MG/1; MG/1
1 TABLET ORAL DAILY
Qty: 90 TABLET | Refills: 0 | Status: SHIPPED | OUTPATIENT
Start: 2022-09-28

## 2022-09-28 RX ORDER — FLUTICASONE PROPIONATE 44 MCG
1 AEROSOL WITH ADAPTER (GRAM) INHALATION 2 TIMES DAILY
Qty: 10.6 G | Refills: 3 | Status: SHIPPED | OUTPATIENT
Start: 2022-09-28

## 2022-09-28 RX ORDER — ALBUTEROL SULFATE 90 UG/1
1-2 AEROSOL, METERED RESPIRATORY (INHALATION) EVERY 4 HOURS PRN
Qty: 18 G | Refills: 0 | Status: SHIPPED | OUTPATIENT
Start: 2022-09-28

## 2022-09-28 ASSESSMENT — PAIN SCALES - GENERAL: PAINLEVEL: NO PAIN (0)

## 2022-09-28 NOTE — PROGRESS NOTES
Clinic Care Coordination Contact    Follow Up Progress Note      Assessment: RN CC aware of clinic care coordination referral to assist patient in scheduling a Urology appointment to set up a catheter prior to a 19 hour flight. Contacted [facility] ([phone]) spoke with their . The staff RNs are working to find an emergent time slot for patient prior to her flight. Once they identify a time slot, they will contact the patient. RN CC will continue to follow up with  Urology this week.     Care Gaps:    Health Maintenance Due   Topic Date Due     COVID-19 Vaccine (4 - Booster for Moderna series) 03/10/2022     PREVENTIVE CARE VISIT  07/12/2022     Postponed to extended trip out of the country.      Care Plans    Intervention/Education provided during outreach: Discussed patient with Urology . CC RN asked open ended questions.     Plan: RN CC will continue to reach out to  Urology to ensure patient has new catheter placed prior to flight.     Care Coordinator will follow up in 1 day.    Leia Evans RN, BSN, CPHN, CM  [facility] Ambulatory Care Management  [facility]  Phone: [phone]  Email: [email or web address]
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
## 2022-09-28 NOTE — PROGRESS NOTES
Assessment & Plan     Paraplegia (H)  Wheelchair bound.  Managed by neuro and physical therapy.     - Primary Care - Care Coordination Referral; Future    Neurogenic bladder    Referral has been placed to urology previously and a call is out to urology care coordination, however she will be traveling next week and this is now an urgent need as she needs catheter supplies set up ASAP.     Note to care coordinator.   I am seeing Teresita for the first time today and she has an upcoming 19 hour flight next week that she will need a catheter set up for.  She has used this before for a previous flight and she said the staff helped set it up for her the day prior.  She has a referral to urology and a call out to urology care coordinator (however that message says appt in 1-2 weeks and she will be out of the country).  Can you please reach out to them and help coordinate this urgent request  - Primary Care - Care Coordination Referral; Future    Hypertension goal BP (blood pressure) < 140/90  BP Readings from Last 6 Encounters:   09/28/22 (!) 146/84   09/27/22 (!) 157/91   08/16/22 (!) 169/82   06/22/22 (!) 146/84   03/21/22 (!) 142/84   03/16/22 118/62     Blood pressures have not been at goal.      Your blood pressure is not at goal, therefore I suggest we use a combo medication instead that add's a new medication call lisinopril.  Stop the hydrochlorothiazide, and instead will use lisinopril/hydrochlorothiazide combo pill, one per day.   Continue the labetalol as is.     Discussed sodium restriction (she does not add a lot of salt to foods)    - lisinopril-hydrochlorothiazide (ZESTORETIC) 20-12.5 MG tablet; Take 1 tablet by mouth daily  - labetalol (NORMODYNE) 100 MG tablet; Take 1 tablet (100 mg) by mouth 2 times daily    Acquired hypothyroidism  Last levels stable, refilled med  - levothyroxine (SYNTHROID/LEVOTHROID) 112 MCG tablet; Take 1 tablet (112 mcg) by mouth daily    Mild persistent reactive airway disease  without complication  Stable.   - FLOVENT HFA 44 MCG/ACT inhaler; Inhale 1 puff into the lungs 2 times daily  - albuterol (PROAIR HFA/PROVENTIL HFA/VENTOLIN HFA) 108 (90 Base) MCG/ACT inhaler; Inhale 1-2 puffs into the lungs every 4 hours as needed for shortness of breath / dyspnea, wheezing or other (cough)    Spastic hemiplegia, unspecified etiology, unspecified laterality (H)  Managed by specialist  - Primary Care - Care Coordination Referral; Future    Other constipation  Use pRn  - bisacodyl (DULCOLAX) 10 MG suppository; Place 1 suppository (10 mg) rectally daily as needed for constipation      40 minutes spent on the date of the encounter doing chart review, history and exam, documentation and further activities per the note           Return in about 3 months (around 12/28/2022) for BP Recheck, Physical Exam.    Chen Aleman PA-C  [facility] NAVI Lo is a 37 year old accompanied by her brother and , presenting for the following health issues:  Recheck Medication and Imm/Inj (Flu Shot)      HPI     Patient with history of hypertension, hypothyroidism and asthma presents for medication follow-up. Patient is leaving for vacation on Tuesday 10/04/22 needing refills before then.     Asthma Follow-Up      Do you have a cough?  No    Are you experiencing any wheezing in your chest?  No    Do you have any shortness of breath?  No     How often are you using a short-acting (rescue) inhaler or nebulizer, such as Albuterol?  Every night    How many days per week do you miss taking your asthma controller medication?  0    Please describe any recent triggers for your asthma: Enviromental changes    Have you had any Emergency Room Visits, Urgent Care Visits, or Hospital Admissions since your last office visit?  No      Hypertension Follow-up      Do you check your blood pressure regularly outside of the clinic? Yes     Are you following a low salt diet? Yes    Are your  blood pressures ever more than 140 on the top number (systolic) OR more   than 90 on the bottom number (diastolic), for example 140/90? Yes, averaging around 130-140     Hypothyroidism Follow-up      Since last visit, patient describes the following symptoms: Weight stable, no hair loss, no skin changes, no constipation, no loose stools      Review of Systems   Constitutional, HEENT, cardiovascular, pulmonary, GI, , musculoskeletal, neuro, skin, endocrine and psych systems are negative, except as otherwise noted.      Objective    BP (!) 146/84   Pulse 67   Temp 98.2  F (36.8  C) (Tympanic)   Resp 16   LMP  (LMP Unknown)   SpO2 98%   Breastfeeding No   There is no height or weight on file to calculate BMI.  Physical Exam   GENERAL: healthy, alert and no distress  NECK: no adenopathy, no asymmetry, masses, or scars and thyroid normal to palpation  RESP: lungs clear to auscultation - no rales, rhonchi or wheezes  CV: regular rate and rhythm, normal S1 S2, no S3 or S4, no murmur, click or rub, no peripheral edema and peripheral pulses strong  MS: no gross musculoskeletal defects noted, no edema    Office Visit on 08/16/2022   Component Date Value Ref Range Status     TSH 08/16/2022 0.62  0.40 - 4.00 mU/L Final     Sodium 08/16/2022 132 (A) 133 - 144 mmol/L Final     Potassium 08/16/2022 4.0  3.4 - 5.3 mmol/L Final     Chloride 08/16/2022 99  94 - 109 mmol/L Final     Carbon Dioxide (CO2) 08/16/2022 22  20 - 32 mmol/L Final     Anion Gap 08/16/2022 11  3 - 14 mmol/L Final     Urea Nitrogen 08/16/2022 10  7 - 30 mg/dL Final     Creatinine 08/16/2022 0.38 (A) 0.52 - 1.04 mg/dL Final     Calcium 08/16/2022 9.1  8.5 - 10.1 mg/dL Final     Glucose 08/16/2022 100 (A) 70 - 99 mg/dL Final     GFR Estimate 08/16/2022 >90  >60 mL/min/1.73m2 Final    Effective December 21, 2021 eGFRcr in adults is calculated using the 2021 CKD-EPI creatinine equation which includes age and gender ( et al., NEJM, DOI:  10.1056/DMYBqg8716410)

## 2022-09-28 NOTE — PATIENT INSTRUCTIONS
Your blood pressure is not at goal, therefore I suggest we use a combo medication instead that add's a new medication call lisinopril.  Stop the hydrochlorothiazide, and instead will use lisinopril/hydrochlorothiazide combo pill, one per day.   Continue the labetalol as is.     I will send a message to the urology care coordinator to assist with setting up catheter supplies for your upcoming trip

## 2022-09-29 ENCOUNTER — PATIENT OUTREACH (OUTPATIENT)
Dept: CARE COORDINATION | Facility: CLINIC | Age: 37
End: 2022-09-29

## 2022-09-29 ENCOUNTER — TELEPHONE (OUTPATIENT)
Dept: FAMILY MEDICINE | Facility: CLINIC | Age: 37
End: 2022-09-29

## 2022-09-29 NOTE — TELEPHONE ENCOUNTER
Reason for Call:  Other Catheter placement    Detailed comments: Patient will be flying out of the country on a very long flight. She leaves on 10/4 at 10 am and is needing a catheter placed. Urology is booking out very far and is hoping family practice can assist    Phone Number Patient can be reached at: Home number on file [phone] (home)    Best Time: any    Can we leave a detailed message on this number? YES    Call taken on 9/29/2022 at 3:42 PM by Sayra Weir

## 2022-09-29 NOTE — PROGRESS NOTES
Clinic Care Coordination Contact    Follow Up Progress Note      Assessment: RN CC followed up with the [facility] ([phone]) to see if patient has obtained an emergent appointment for catheter placement prior to her 19 hour flight next week. Spoke with the  who does not show an appointment. She spoke with RN staff. They have a video appt available on Tuesday, 10/4/22 at 12 noon to establish care and a RN visit available on Wednesday 10/5 for catheter placement. Staff is planning to call out to patient to schedule these appointments. RN CC will call clinic this afternoon to verify they have been able to schedule these appointments.     Care Gaps:    Health Maintenance Due   Topic Date Due     COVID-19 Vaccine (4 - Booster for Moderna series) 03/10/2022     PREVENTIVE CARE VISIT  07/12/2022     Postponed to patient contact.      Care Plans - None     Intervention/Education provided during outreach: Discussed patients need for catheter placement prior to flight with  at Urology clinic. CC RN asked open ended questions.      Plan: RN CC will continue to follow up until an appointment(s) has been scheduled with Urology.     Care Coordinator will follow up in 1 business    Leia Evans RN, BSN, CPHN, CM  [facility]  [facility]  Phone: [phone]  Email: [email or web address]

## 2022-09-29 NOTE — TELEPHONE ENCOUNTER
Multiple Kalpana in patient chart about this request, per urology needs appt and they will not just place a catheter without her being established.     9-27 Urology note:    Writer called pt with an . Writer offered 10/4 appt but pt unable to make 10/5 cath placement if needed on 10/5. Pt never confirmed when they were leaving but it seems as though it will be on 10/5 and they will be gone 2-3 months. Writer provided [place] [facility] number as well to schedule now or in 2-3 months as well as our number. Writer unable to offer anything sooner with a provider for neurogenic bladder. Pt cannot just have cath placed, pt needs to be established. Pt will call [place] [facility], they went back and forth from saying they are able to be seen in 2-3 months to stating its urgent. 20 min phone call with .     9/29 CC note:    Assessment: RN CC followed up with the [facility] ([phone]) to see if patient has obtained an emergent appointment for catheter placement prior to her 19 hour flight next week. Spoke with the  who does not show an appointment. She spoke with RN staff. They have a video appt available on Tuesday, 10/4/22 at 12 noon to establish care and a RN visit available on Wednesday 10/5 for catheter placement. Staff is planning to call out to patient to schedule these appointments. RN CC will call clinic this afternoon to verify they have been able to schedule these appointments.     Routing to CC, we do not even have the supplies in clinic to be able to do this so would not be able to see pt for this.    Zee COCHRAN RN
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
## 2022-09-30 ENCOUNTER — PATIENT OUTREACH (OUTPATIENT)
Dept: CARE COORDINATION | Facility: CLINIC | Age: 37
End: 2022-09-30

## 2022-09-30 NOTE — PROGRESS NOTES
Clinic Care Coordination Contact    Follow Up Progress Note      Assessment: RN CC received message from clinic RN: Writer called pt with an . Writer offered 10/4 appt but pt unable to make 10/5 cath placement if needed on 10/5. Pt never confirmed when they were leaving but it seems as though it will be on 10/5 and they will be gone 2-3 months. [place] provided [place] [facility] [place] as well to schedule now or in 2-3 months as well as our number. Writer unable to offer anything sooner with a provider for neurogenic bladder. Pt cannot just have cath placed, pt needs to be established. [place] call [place] [facility], they went back and forth from saying they are able to be seen in 2-3 months to stating its urgent. 20 min phone call with .      9/29 CC note:     Assessment: RN CC followed up with the [facility] ([phone]) to see if patient has obtained an emergent appointment for catheter placement prior to her 19 hour flight next week. Spoke with the  who does not show an appointment. She spoke with RN staff. They have a video appt available on Tuesday, 10/4/22 at 12 noon to establish care and a RN visit available on Wednesday 10/5 for catheter placement. Staff is planning to call out to patient to schedule these appointments. RN CC will call clinic this afternoon to verify they have been able to schedule these appointments.    Routing to CC, we do not even have the supplies in clinic to be able to do this so would not be able to see pt for this.     Care Gaps:    Health Maintenance Due   Topic Date Due     COVID-19 Vaccine (4 - Booster for Moderna series) 03/10/2022     PREVENTIVE CARE VISIT  07/12/2022     Did not discuss with patient.     Care Plans - None. Patient leaving the country for 2-3 months.     Intervention/Education provided during outreach: Expressed thanks to clinic staff for their efforts. Patient has instructions on how to follow up with [facility] prior to her  trip.     Plan: Sent update to patient's primary care provider via staff message. Closed referral.     Care Coordinator will not follow up at this time.     Leia Evans RN, BSN, CPHN, CM  [facility]  [facility]  Phone: [phone]  Email: [email or web address]

## 2022-10-01 NOTE — TELEPHONE ENCOUNTER
I don't have any further suggestions.  Agree, this needed to be addressed with more than 1 weeks notice.     Chen Aleman PA-C     yes

## 2022-10-17 ENCOUNTER — TELEPHONE (OUTPATIENT)
Dept: PHYSICAL MEDICINE AND REHAB | Facility: CLINIC | Age: 37
End: 2022-10-17

## 2022-10-17 NOTE — TELEPHONE ENCOUNTER
Called pt along with  to let patient  know, Appointment has been rescheduled from 12/20/22 to 12/22/22 at 9:40, however we couldn't leave a message due to Voicemail full.

## 2023-03-15 NOTE — NURSING NOTE
Chief Complaint   Patient presents with     RECHECK     botox     MARY Wilcox   Patient calling with questions/issues in regards to the following medication/s:   ibandronate   Patient having the following symptoms/issues:Irritated esophagus and stomach.   Patient would like to know if she can change to another medication.    If patient would require a prescription, please us the following Pharmacy:   [facility] [address] [place] [address] [address] AT [facility] OF [address]  [address] [facility] [address]  Phone: [phone] Fax: [phone]       Please call patient at the following number:[phone] (home)   Patient states that it is okay to leave a detailed message.    Patient was informed that the patient would receive a phone call back within 24-48 hours unless this request is STAT.   08-Mar-2023

## 2023-04-05 NOTE — LETTER
6/22/2022       RE: Teresita Bang  [address]  [place] [address]     Dear Colleague,    Thank you for referring your patient, Teresita Bang, to the [facility]IC[facility]IL[facility]IN[facility] [place] [facility]. Please see a copy of my visit note below.    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 She is accompanied by her mother and brother today. She was followed by Dr. Dejesus in the past and was last seen in our clinic  12/2021.  Last seen in our clinic 3/21/22.    She is overall pleased with the results.  Denies any side effects. She believes the benefits started to wear off at 10 week time point; very similar to the previous injections and continues to receive benefit from the intervention.  Severity of spasticity is much improved with the botulinum toxin. Deformities and ROM have not changed as expected.     She has a power [employer] (new since 2018; vendor is [employer]). Does not use braces of any kind.     Medically, she has been stable with no ER visit or hospitalization. She is followed by her PCP regarding HTN, HLD and other medical issues.       Physical exam:  BP (!) 146/84   Pulse 84   Temp 97.6  F (36.4  C)   Resp 16   SpO2 96%      She was alert pleasant, sitting comfortably in power WC.   Unchanged -   Involuntary spasms are noted in her bilateral lower extremities. These exacerbate with attempted passive range of motion.  Modified Lorin 3/4 in adductor and KF muscles b/l, 4/4 versus fixed contracture in the bilateral ankles and toes with severe mid-foot / ankle plantar flexion inversion positioning.   Overlying skin in the thighs hamstrings is healthy without lesions or evidence of skin breakdown.    Assessment and  recommendations:  She responds well to Botox injections for the management of spasticity and associated symptoms. Contractures and deformities in her feet/ankle are not expected to change with this procedure. No change in the total dose or sites of injections today.    Follow-up in 3 months time, will call sooner if any side effects or other concerns arise.       We have discussed other rehab needs several times in our clinic. We use  all the time but she doesn't seem to follow through with any of our recommendations.   --DEXA scan is still pending - gave her the number to schedule multiple times. Today, Carole helped her, called and scheduled on 7/7. Placed new orders for DEXA and lab and also scheduled f/u with Dr. Pascual on 7/27.  --PT was not started - will defer for now   --Had a visit in seating clinic with [employer], [facility] 9/7/2021; recommendation as below. She has not received the equipments yet and has told me that they do no answer her calls. I just placed a new referral to the seating clinic today as she is close to 5 year kerline for a new WC and Carole helped them schedule the eval on 8/17.     Pt asked about foldable power wheelchair for travel - [facility] and educated pt and brother about lack of insurance funding and need to pay out of pocket unless fully used in home. Also educated pt and brother about process for seeking repairs and modifications for pt's current power wc to address her mobility and function concerns. Pt requires a lap tray to allow her to write while attending [employer]. She also requires a padded foot box to accommodate her severe ankle contractures, prevent her feet from falling off the footplates (which also limits her mobility and ability to attend [employer]), and to protect against new incidences of skin breakdown in the future. Thigh guides should be considered for LE alignment to assist with foot placement. L armrest requires  "repairs/replacement.     --Reviewed OT recs from her visit on 2021 - this has also been pending.   Teresita was not a  before but would like to start driving with hand control vehicle. This seems very reasonable. Gave her the number below to discuss with the team at [facility].       's Assessment and Training Program  Ph: [phone]  Fax: [phone]     --Ordered \"Bariatric commode with extra wide seat\" and faxed to [employer] in Dec 2021.       -------------------------------    Procedure: Chemodenervation to bilateral lower extremities utilizing onabotulinum toxin type A, Botox. I reviewed at length the risks benefits and potential time course for onset and duration. She agrees and signed the consent form today with her single initial.   present and answered all questions for this process. Total 400 units of Botox Lot # [id number] with Expiration Date: 2023 was utilized. NDC #9946-0330-90 Diluted with preservative-free normal saline ratio of 100 units per milliliter. This was divided into 4 syringes. 27-gauge recording EMG needle was utilized in conjunction with EMG guidance to identify the most active motor units while avoiding surrounding structures. All areas were cleansed with ChloraPrep. Injections are done in her WC in recline position. The following muscles were then injected.    Left le. Hamstring, 100 units divided over 2 sites  2. Gastrocnemius, 50 units divided over 2 sites  3. Adductors 50 units     Right le. Hamstring, 100 units divided over 2 sites  2. Gastrocnemius, 50 units divided over 2 sites  3. Adductors 50 units       Kathryn Prado MD  Physical Medicine & Rehabilitation       I spent a total of 25 minutes was spent face to face with the patient excluding the procedure.  Greater than 50% of the time was spent in counseling and coordinating care.   " Stelara Pregnancy And Lactation Text: This medication is Pregnancy Category B and is considered safe during pregnancy. It is unknown if this medication is excreted in breast milk.

## 2023-05-08 ENCOUNTER — TELEPHONE (OUTPATIENT)
Dept: PHYSICAL MEDICINE AND REHAB | Facility: CLINIC | Age: 38
End: 2023-05-08
Payer: COMMERCIAL

## 2023-05-08 NOTE — TELEPHONE ENCOUNTER
Left message for pt, along with a [facility]  stating to call back for scheduling her annual telephone visit follow up with Dr Pascual.

## 2023-07-21 ENCOUNTER — TELEPHONE (OUTPATIENT)
Dept: FAMILY MEDICINE | Facility: CLINIC | Age: 38
End: 2023-07-21
Payer: COMMERCIAL

## 2023-07-21 NOTE — LETTER
July 21, 2023    To  Teresita Bang  [address]  [place] [address]    Your team at [facility] cares about your health. We have reviewed your chart and based on our findings; we are making the following recommendations to better manage your health.     You are in particular need of attention regarding the following:     Asthma Control Test     This screening tool helps us to assess how well your asthma is controlled.Good asthma control leads to fewer asthma symptoms and greater health. If your asthma is not in good control (score is 19 or less) or you have been to the ER or urgent care for your asthma, it is recommended you be seen by your provider for medication and lifestyle adjustments.      Please complete and return the attached Asthma Control Test respond below with you answers for each question: Adult ACT     This is valuable information that is requested by your Care Team.    HYPERTENSION FOLLOW UP: Office Visit  Schedule a primary care office visit with your provider for a Pap Smear to screen for Cervical Cancer.  Please schedule a Nurse Only Appointment with your primary care clinic to update your immunizations that are due.  PREVENTATIVE VISIT: Physical Providers schedules are currently about a month out. Please schedule at your earliest convenience.      If you have already completed these items, please contact the clinic via phone or   MyChart so your care team can review and update your records. Thank you for   choosing [facility] for your healthcare needs. For any questions,   concerns, or to schedule an appointment please contact our clinic.    Healthy Regards,    Your [facility] Care Team

## 2023-07-21 NOTE — TELEPHONE ENCOUNTER
Patient Quality Outreach    Patient is due for the following:   Asthma  -  ACT needed, Asthma follow-up visit and AAP  Hypertension -  BP check and Hypertension follow-up visit  Cervical Cancer Screening - PAP Needed  Physical Preventive Adult Physical      Topic Date Due     Hepatitis B Vaccine (2 of 3 - 19+ 3-dose series) 07/20/2012     COVID-19 Vaccine (4 - Moderna series) 03/10/2022       Type of outreach:    Sent letter.      Questions for provider review:    None           Martha Keller MA  Chart routed to Care Team.

## 2023-07-24 ENCOUNTER — ANCILLARY PROCEDURE (OUTPATIENT)
Dept: CT IMAGING | Facility: CLINIC | Age: 38
End: 2023-07-24
Attending: INTERNAL MEDICINE
Payer: COMMERCIAL

## 2023-07-24 ENCOUNTER — TELEPHONE (OUTPATIENT)
Dept: PHYSICAL MEDICINE AND REHAB | Facility: CLINIC | Age: 38
End: 2023-07-24

## 2023-07-24 DIAGNOSIS — R06.02 SOB (SHORTNESS OF BREATH) ON EXERTION: ICD-10-CM

## 2023-07-24 DIAGNOSIS — R05.8 RECURRENT COUGH: ICD-10-CM

## 2023-07-24 PROCEDURE — 71250 CT THORAX DX C-: CPT | Mod: GC | Performed by: RADIOLOGY

## 2023-07-24 NOTE — TELEPHONE ENCOUNTER
Please offer any 30 min spot on ,  or  to rebook her annual visit of  due to Dr Pascual is not in clinic this week.    There are 60 min spots on those dates that may be spit down to 30 min f/u, okay to go out to  or later if needed.      Also, last botox visit with Dr Prado was 2022, so check if she is interested in an annual visit with Dr Prado as well. (Would need a separate visit to rewrite botox orders if indicated as the previous orders are ).    We do not have a reason documented as to why she did not return, also her last PCP visit in our system was 2022 (she was going to travel with her family for several months)    Teresita had a chest CT done today  at [place], which was ordered last month by a provider from an outside urgent care in [facility].      Thanks for your help!    Carole Mccrary RN on 2023 at 6:41 PM

## 2023-07-25 ENCOUNTER — APPOINTMENT (OUTPATIENT)
Dept: INTERPRETER SERVICES | Facility: CLINIC | Age: 38
End: 2023-07-25
Payer: COMMERCIAL

## 2023-07-25 NOTE — TELEPHONE ENCOUNTER
Response back at 1:31 pm:    Message  Received: Today  Kristel Arzate Stephanie, RN  I used an interp to contact this pt. It was to my understanding that I was speaking to the pt.

## 2023-07-25 NOTE — TELEPHONE ENCOUNTER
Staff Message of 8:55 on 7/25/23:    Kristel Arzate [facility]..    I was asked to continue to contact these pt's so they were aware of the reschedule for Sweetie Pascual on 7/26/23. I did reschedule this pt but she is scheduled into Oct. If there is a sooner appointment that you would like to offer, please reach out.    Thank You

## 2023-07-25 NOTE — TELEPHONE ENCOUNTER
Sent staff message back to Kristel to see which family member arranged the new appointment, as  we have documented additional questions about Jielijens's appointments which still need f/u, wondering what they may have said.    Carole Mccrary RN on 7/25/2023 at 1:06 PM

## 2023-07-25 NOTE — TELEPHONE ENCOUNTER
Reached out to patient along with Latvian  Wilfrid. We left a detailed voicemail for pt to call back and reschedule her 7/26 appt for Dr Pascual.     Options dates for rescheduling 8/16, 8/23,  8/30

## 2023-09-15 ENCOUNTER — TRANSCRIBE ORDERS (OUTPATIENT)
Dept: OTHER | Age: 38
End: 2023-09-15

## 2023-09-15 DIAGNOSIS — N92.6 IRREGULAR MENSTRUATION: Primary | ICD-10-CM

## 2023-10-17 ENCOUNTER — TELEPHONE (OUTPATIENT)
Dept: PHYSICAL MEDICINE AND REHAB | Facility: CLINIC | Age: 38
End: 2023-10-17

## 2023-10-17 NOTE — TELEPHONE ENCOUNTER
SITUATION:  Pt is booked 10/25/23 for annual f/u with Dr Pascual     This appointment needs to be cancelled due to provider is no longer available.    BACKGROUND:  Last visit for bone health: 7/27/2022 - no meds ordered, 10/25 appt is to discuss current situation    Pt also sees Dr Prado for general PM &R needs, last visit was 9/27/2022    ASSESSMENT / ACTION:  Teresita is due for annual revisit with Dr Prado.    Bone health check in can be postponed, and Dr Prado can determine when bone health f/u is needed, then refer to another department for Rx    Pt was not given OP medication due to the possibility pregnancy, also does not meet criteria for Osteoporosis, does meet for osteopenia + immobility  Dexa done 7/7/2022:  Impression: Based on BMD diagnosis is consistent with low bone density based on WHO criteria Ref. 1    REQUEST / RECOMMENDATION:  Please call to cancel appt with Dr Pascual of 10/25 - we are not able to offer a new date.  Book annual f/u with Dr Prado and explain that Dr Prado will review and decide when Teresita may need to see another bone health provider.    Thank you!    Carole Mccrary, RN on 10/17/2023 at 1:01 PM

## 2023-10-18 ENCOUNTER — OFFICE VISIT (OUTPATIENT)
Dept: PHYSICAL MEDICINE AND REHAB | Facility: CLINIC | Age: 38
End: 2023-10-18
Payer: COMMERCIAL

## 2023-10-18 VITALS
HEIGHT: 66 IN | HEART RATE: 77 BPM | BODY MASS INDEX: 25.4 KG/M2 | SYSTOLIC BLOOD PRESSURE: 159 MMHG | DIASTOLIC BLOOD PRESSURE: 87 MMHG | WEIGHT: 158.07 LBS | OXYGEN SATURATION: 99 %

## 2023-10-18 DIAGNOSIS — M62.838 SPASM OF MUSCLE: Primary | ICD-10-CM

## 2023-10-18 DIAGNOSIS — M62.838 MUSCLE SPASTICITY: ICD-10-CM

## 2023-10-18 DIAGNOSIS — G82.22 INCOMPLETE PARAPLEGIA (H): ICD-10-CM

## 2023-10-18 PROCEDURE — 95874 GUIDE NERV DESTR NEEDLE EMG: CPT | Performed by: PHYSICAL MEDICINE & REHABILITATION

## 2023-10-18 PROCEDURE — 64643 CHEMODENERV 1 EXTREM 1-4 EA: CPT | Performed by: PHYSICAL MEDICINE & REHABILITATION

## 2023-10-18 PROCEDURE — 64642 CHEMODENERV 1 EXTREMITY 1-4: CPT | Performed by: PHYSICAL MEDICINE & REHABILITATION

## 2023-10-18 NOTE — NURSING NOTE
"Chief Complaint   Patient presents with    RECHECK     Annual follow up     BP (!) 159/87 (BP Location: Right arm, Patient Position: Chair, Cuff Size: Adult Large)   Pulse 77   Ht 1.676 m (5' 5.98\")   Wt 71.7 kg (158 lb 1.1 oz)   SpO2 99%   BMI 25.53 kg/m      Rodolfo Garduno, EMT  "

## 2023-10-18 NOTE — LETTER
10/18/2023       RE: Teresita Bang  [address]  [facility] [id number]     Dear Colleague,    Thank you for referring your patient, Teresita Bang, to the [facility] [place] at [facility]. Please see a copy of my visit note below.    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 She is accompanied by her brother today. She was followed by Dr. Dejesus in the past and was last seen in our clinic  12/2021.  Last seen in our clinic 9/2022.    Background   --Had a visit in seating clinic with My [facility] DPT, [facility] 9/7/2021; recommendation as below. She has not received the equipments yet and has told me that they do no answer her calls. I just placed a new referral to the seating clinic today as she is close to 5 year kerline for a new WC and Carole helped them schedule the eval on 8/17.     Pt asked about foldable power wheelchair for travel - provided resource for [facility] [place] and educated pt and brother about lack of insurance funding and need to pay out of pocket unless fully used in home. Also educated pt and brother about process for seeking repairs and modifications for pt's current power wc to address her mobility and function concerns. Pt requires a lap tray to allow her to write while attending [employer]. She also requires a padded foot box to accommodate her severe ankle contractures, prevent her feet from falling off the footplates (which also limits her mobility and ability to attend [employer]), and to protect against new incidences of skin breakdown in the future. Thigh guides should be considered for LE alignment to assist with foot placement. L armrest requires repairs/replacement.     --Reviewed OT recs from her visit on  "11/23/2021 - this has also been pending.   Teresita was not a  before but would like to start driving with hand control vehicle. This seems very reasonable. Gave her the number below to discuss with the team at [facility].       's Assessment and Training Program  Ph: [phone]  Fax: [phone]     --Ordered \"Bariatric commode with extra wide seat\" and faxed to [employer] in Dec 2021.     --Had a visit with  7/27/2022- per her note; \"at high risk for fracture based on BMD by DXA 7/2022.   However, pharmacological intervention is not indicated now as she is of child-bearing potential and will  in 2 months. RTC in 1 year to reassess or in the event of incident fracture. \"    --Saw Lin in seating clinic 8/17/2022 - \"Was significant amount of confusion of prior work done, continued needs doing what.  Communicated with patient  and  today to determine that [employer] is working on a backup manual chair and provided a private pay paulina power folding wheelchair for patient.  Max has made a custom full box and lap tray for patient that she already has.  She now requires bilateral large thigh guards with removal hardware in order to help with hip abduction due to weak lower extremities as well as her armrest pads need to be replaced to damage.\"      Interval history   She was seen in clinic more than one [place]   Medically, she has been stable with no ER visit or hospitalization. She is followed by her PCP regarding HTN, HLD and other medical issues.   She has a power WC (new since 2018; vendor is [employer]). Does not use braces of any kind.     Her legs have been very tight due to delay in the botox injections. No change in function. Lap tray and other parts of her WC are broken and she wants a new chair now that she is at 5 year kerline.     Physical exam:  BP (!) 159/87 (BP Location: Right arm, Patient Position: Chair, Cuff Size: " "Adult Large)   Pulse 77   Ht 1.676 m (5' 5.98\")   Wt 71.7 kg (158 lb 1.1 oz)   SpO2 99%   BMI 25.53 kg/m       She was alert pleasant, sitting comfortably in power WC.   Unchanged -   Involuntary spasms are noted in her bilateral lower extremities. These exacerbate with attempted passive range of motion.  Modified Lorin 3/4 in adductor and KF muscles b/l, 4/4 versus fixed contracture in the bilateral ankles and toes with severe mid-foot / ankle plantar flexion inversion positioning.   Overlying skin in the thighs hamstrings is healthy without lesions or evidence of skin breakdown.    Assessment and recommendations:  She responds well to Botox injections for the management of spasticity and associated symptoms. Contractures and deformities in her feet/ankle are not expected to change with this procedure. No change in the total dose or sites of injections today.    Follow-up in 3 months time, will call sooner if any side effects or other concerns arise.   New seating clinic referral.  Wii discuss bone health and send a new referral to endo team at next visit.    -------  Procedure: Chemodenervation to bilateral lower extremities utilizing onabotulinum toxin type A, Botox. I reviewed at length the risks benefits and potential time course for onset and duration. She agrees and signed the consent form today with her single initial.   present and answered all questions for this process. Total 400 units of Botox diluted with preservative-free normal saline ratio of 100 units per milliliter. This was divided into 4 syringes. 27-gauge recording EMG needle was utilized in conjunction with EMG guidance to identify the most active motor units while avoiding surrounding structures. All areas were cleansed with ChloraPrep. Injections are done in her  in recline position. The following muscles were then injected.    Left le. Hamstring, 100 units divided over 2 sites  2. Gastrocnemius, 50 units divided " over 2 sites  3. Adductors 50 units     Right le. Hamstring, 100 units divided over 2 sites  2. Gastrocnemius, 50 units divided over 2 sites  3. Adductors 50 units              Again, thank you for allowing me to participate in the care of your patient.      Sincerely,    Kathryn Prado MD

## 2023-10-18 NOTE — PROGRESS NOTES
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 She is accompanied by her brother today. She was followed by Dr. Dejesus in the past and was last seen in our clinic  12/2021.  Last seen in our clinic 9/2022.    Background   --Had a visit in seating clinic with [facility] DPT, [facility] 9/7/2021; recommendation as below. She has not received the equipments yet and has told me that they do no answer her calls. I just placed a new referral to the seating clinic today as she is close to 5 year kerline for a new WC and Carole helped them schedule the eval on 8/17.     Pt asked about foldable power wheelchair for travel - provided resource for [facility] [place] and educated pt and brother about lack of insurance funding and need to pay out of pocket unless fully used in home. Also educated pt and brother about process for seeking repairs and modifications for pt's current power wc to address her mobility and function concerns. Pt requires a lap tray to allow her to write while attending [employer]. She also requires a padded foot box to accommodate her severe ankle contractures, prevent her feet from falling off the footplates (which also limits her mobility and ability to attend [employer]), and to protect against new incidences of skin breakdown in the future. Thigh guides should be considered for LE alignment to assist with foot placement. L armrest requires repairs/replacement.     --Reviewed OT recs from her visit on 11/23/2021 - this has also been pending.   Teresita was not a  before but would like to start driving with hand control vehicle. This seems very reasonable. Gave her the number below to discuss with the team at [facility].       's Assessment and Training Program  Ph: [phone]  Fax: [phone]     --Ordered \"Bariatric commode with extra wide " "seat\" and faxed to [employer] in Dec 2021.     --Had a visit with  7/27/2022- per her note; \"at high risk for fracture based on BMD by DXA 7/2022.   However, pharmacological intervention is not indicated now as she is of child-bearing potential and will  in 2 months. RTC in 1 year to reassess or in the event of incident fracture. \"    --Saw Lin in seating clinic 8/17/2022 - \"Was significant amount of confusion of prior work done, continued needs doing what.  Communicated with patient  and  today to determine that [employer] is working on a backup manual chair and provided a private pay paulina power folding wheelchair for patient.  Max has made a custom full box and lap tray for patient that she already has.  She now requires bilateral large thigh guards with removal hardware in order to help with hip abduction due to weak lower extremities as well as her armrest pads need to be replaced to damage.\"      Interval history   She was seen in clinic more than one year ago because [place] was [place].   Medically, she has been stable with no ER visit or hospitalization. She is followed by her PCP regarding HTN, HLD and other medical issues.   She has a power WC (new since 2018; vendor is [employer]). Does not use braces of any kind.     Her legs have been very tight due to delay in the botox injections. No change in function. Lap tray and other parts of her WC are broken and she wants a new chair now that she is at 5 year kerline.     Physical exam:  BP (!) 159/87 (BP Location: Right arm, Patient Position: Chair, Cuff Size: Adult Large)   Pulse 77   Ht 1.676 m (5' 5.98\")   Wt 71.7 kg (158 lb 1.1 oz)   SpO2 99%   BMI 25.53 kg/m       She was alert pleasant, sitting comfortably in power WC.   Unchanged -   Involuntary spasms are noted in her bilateral lower extremities. These exacerbate with attempted passive range of motion.  Modified Lorin 3/4 in adductor and KF " muscles b/l, 4/4 versus fixed contracture in the bilateral ankles and toes with severe mid-foot / ankle plantar flexion inversion positioning.   Overlying skin in the thighs hamstrings is healthy without lesions or evidence of skin breakdown.    Assessment and recommendations:  She responds well to Botox injections for the management of spasticity and associated symptoms. Contractures and deformities in her feet/ankle are not expected to change with this procedure. No change in the total dose or sites of injections today.    Follow-up in 3 months time, will call sooner if any side effects or other concerns arise.   New seating clinic referral.  Wii discuss bone health and send a new referral to endo team at next visit.    -------  Procedure: Chemodenervation to bilateral lower extremities utilizing onabotulinum toxin type A, Botox. I reviewed at length the risks benefits and potential time course for onset and duration. She agrees and signed the consent form today with her single initial.   present and answered all questions for this process. Total 400 units of Botox diluted with preservative-free normal saline ratio of 100 units per milliliter. This was divided into 4 syringes. 27-gauge recording EMG needle was utilized in conjunction with EMG guidance to identify the most active motor units while avoiding surrounding structures. All areas were cleansed with ChloraPrep. Injections are done in her WC in recline position. The following muscles were then injected.    Left le. Hamstring, 100 units divided over 2 sites  2. Gastrocnemius, 50 units divided over 2 sites  3. Adductors 50 units     Right le. Hamstring, 100 units divided over 2 sites  2. Gastrocnemius, 50 units divided over 2 sites  3. Adductors 50 units     Kathryn Prado MD  Physical Medicine & Rehabilitation

## 2023-10-21 DIAGNOSIS — K59.09 OTHER CONSTIPATION: ICD-10-CM

## 2023-10-23 RX ORDER — BISACODYL 10 MG
SUPPOSITORY, RECTAL RECTAL
Qty: 90 SUPPOSITORY | OUTPATIENT
Start: 2023-10-23

## 2023-11-20 ENCOUNTER — TELEPHONE (OUTPATIENT)
Dept: PHYSICAL MEDICINE AND REHAB | Facility: CLINIC | Age: 38
End: 2023-11-20
Payer: COMMERCIAL

## 2023-11-20 NOTE — TELEPHONE ENCOUNTER
Health Call Center    Phone Message    May a detailed message be left on voicemail: yes     Reason for Call: Other: The patient is calling with an  requesting that Dr. Pardo send a note for the patient to get a new electric wheelchair. She states that she has been using her current one for 5 years and it is deteriorating. She will need to orders sent to the [facility] facility so they can measure her for the new wheelchair. She states that she will also reach out to [facility] to get the process started. Please call back with an .      Action Taken: Message routed to:  [facility] ([facility]    Travel Screening: Not Applicable

## 2023-11-20 NOTE — TELEPHONE ENCOUNTER
Returned call to patient along with [place] , notified that the order for P/T and O/T for seating and wheeled mobility. Pt chose to go to [facility]. Order is in [facility] system.

## 2023-12-05 NOTE — LETTER
ER Provider Note    Scribed for Stefan Acosta M.D. by Danelle Kong. 12/5/2023   1:48 PM    Primary Care Provider: Sahilesh Plunkett M.D.    CHIEF COMPLAINT  Chief Complaint   Patient presents with    Medication Reaction     Was seen last night for UTI  took med last night while here   today noticed hive on body around 8 am       EXTERNAL RECORDS REVIEWED  Outpatient Notes :urine culture recently consistent with ESBL    HPI/ROS  LIMITATION TO HISTORY   Select: : None  OUTSIDE HISTORIAN(S):  None noted    Honey Villa is a 73 y.o. female who presents to the ED for evaluation of hives noticed this morning. The patient states she started taking Bactrim last night after being diagnosed with a UTI. She denies any itchiness.     PAST MEDICAL HISTORY  Past Medical History:   Diagnosis Date    Bowel habit changes     Hashimoto disease     Hoarse voice quality     Other specified symptom associated with female genital organs 07/01/2011    prolapsed uterus    Parkinson's disease     Snoring     Urinary bladder disorder     UTI hx       SURGICAL HISTORY  Past Surgical History:   Procedure Laterality Date    HYSTERECTOMY ROBOTIC Bilateral 11/9/2016    Procedure: HYSTERECTOMY ROBOTIC - BSO;  Surgeon: Renetta Schaeffer M.D.;  Location: SURGERY SAME DAY [facility];  Service:     ANTERIOR AND POSTERIOR REPAIR N/A 11/9/2016    Procedure: ANTERIOR AND POSTERIOR REPAIR;  Surgeon: Renetta Schaeffer M.D.;  Location: SURGERY SAME DAY [facility];  Service:     CYSTOSCOPY  11/9/2016    Procedure: CYSTOSCOPY;  Surgeon: Renetta Schaeffer M.D.;  Location: SURGERY SAME DAY [facility];  Service:     BUNIONECTOMY GREG  11/2/2011    Performed by DEENA CLIFFORD at [facility]    TENOTOMY  11/2/2011    Performed by DEENA CLIFFORD at [facility] ORS    CAPSULOTOMY  11/2/2011    Performed by DEENA CLIFFORD at [facility]    BUNIONECTOMY  2005    right    OTHER  2005    mohs surgery skin cancer from nose  1/21/2021       RE: Teresita Bang  [address]  [facility] [address]     Dear Colleague,    Thank you for referring your patient, Teresita Bang, to the [facility] PHYSICAL MEDICINE AND REHABILITATION CLINIC [place] at [facility]. Please see a copy of my visit note below.    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 She is accompanied by her sister today. She was followed by Dr. Dejesus and was last seen in our clinic 10/22/20.  She is very pleased with the results last time.  Denies any side effects. She believes the benefits started to wear off at 10 week time point; very similar to the previous injections.  Severity of spasticity is much improved with the botulinum toxin.    She has a power WC (new since 2018). She transfers to bed via slide board. Does not use braces of any kind.     Medically, she has been stable with no ER visit or hospitalization. She is followed by her PCP regarding HTN and HLD management.       Physical exam:  BP (!) 184/92   Pulse 85   Temp 97.8  F (36.6  C)   SpO2 97%      Repeat BP was lower but still elevated     She was alert pleasant, sitting comfortably in power WC.     Involuntary spasms are noted in her bilateral lower extremities. These exacerbate with attempted passive range of motion, especially in quads b/l. Has both flexion and extension spasticity patterns. No adductor spasms noted and resting position is with legs comfortable wide.  Modified Lorin 3/4 in both legs quads and hamstrings above the knee, 4/4 versus fixed contracture in the bilateral ankles and toes with severe mid-foot / ankle plantar flexion inversion positioning.   Overlying skin in the thighs hamstrings is healthy without lesions or evidence of skin        FAMILY HISTORY  Family History   Problem Relation Age of Onset    Heart Disease Other     Hypertension Other     Stroke Other        SOCIAL HISTORY   reports that she has never smoked. She has never used smokeless tobacco. She reports current alcohol use of about 0.5 oz of alcohol per week. She reports that she does not use drugs.    CURRENT MEDICATIONS  Previous Medications    ASCORBIC ACID (ASCORBIC ACID) 500 MG TAB    Take 500 mg by mouth every day.    BENFOTIAMINE PO    Take  by mouth.    CALCIUM-MAGNESIUM-VITAMIN D 500-250-200 MG-MG-UNIT TAB    Take  by mouth.    CALCIUM-VITAMIN D-VITAMIN K (CALCIUM + D + K PO)    Take 750 mg by mouth every day. Takes two     CARBIDOPA-LEVODOPA (SINEMET)  MG TAB    Take 1.5 Tablets by mouth 3 times a day.    CICLOPIROX (PENLAC) 8 % SOLUTION    APPLY DAILY TO FUNGAL TOENAILS USE ACETONE AND NAIL FILE TO REMOVE WEEKLY    CLOBETASOL (TEMOVATE) 0.05 % EXTERNAL SOLUTION    APPLY TO SCALP ONCE DAILY WITH A MAX USE OF 4 DAYS PER WEEK AS NEEDED FOR ITCHING/RASH.    COENZYME Q10 (CO Q 10 PO)    Take  by mouth.    CYANOCOBALAMIN (VITAMIN B-12) 100 MCG TAB    Take 100 mcg by mouth every day.    ESTRADIOL (ESTRACE) 0.1 MG/GM VAGINAL CREAM    500 mg to 1 g one to three times per week    ESTROGENS, CONJUGATED (PREMARIN) 0.625 MG/GM CREAM        IVERMECTIN 1 % CREAM    APPLY TO AFFECTED AREAS ON FACE TWICE A DAY    KETOCONAZOLE (NIZORAL) 2 % CREAM    APPLY TWICE A DAY TO FUNGLE TOE NAILS AND SKIN    LEVOTHYROXINE (SYNTHROID) 75 MCG TAB    Take 1 Tablet by mouth every morning on an empty stomach.    MAGNESIUM PO    Take  by mouth. Magtein    NIACIN PO    Take  by mouth.    NON SPECIFIED    Dopa-boost for parkinson's    SULFAMETHOXAZOLE-TRIMETHOPRIM (BACTRIM DS) 800-160 MG TABLET    Take 1 Tablet by mouth 2 times a day for 7 days.    TRIAMCINOLONE ACETONIDE (KENALOG) 0.1 % OINTMENT        VALACYCLOVIR (VALTREX) 1 GM TAB           ALLERGIES  Allergies   Allergen Reactions    Sulfa  breakdown.    Assessment and recommendations:  Teresita is doing well. She responds well to Botox injections for the management of spasticity and associated symptoms. Contractures and deformities in her feet/ankle are not expected to change with this procedure. No change in the total dose or sites of injections today. She will discuss evaluation of vitamin D level and osteoporosis with her PCP. Can consider referral to Dr. Pascual bone health clinic. Sent a message to Dr. Shankar to review the plan.    Teresita has severe spasticity in her lower extremities related to her spinal cord injury and has benefited from chemodenervation with botulinum toxin in the past.  We will repeat these injections today. She for sure has knee extension tone but has articulated the knee flexion tone is the more problematic functionally.  Will continue with hamstrings, gastrocnemius, and adductors  She should continue with positioning and stretching exercises.  Follow-up in 3 months time, will call sooner if any side effects or other concerns arise.       Procedure: Chemodenervation to bilateral lower extremities utilizing onabotulinum toxin type A, Botox. I reviewed at length the risks benefits and potential time course for onset and duration. She agrees and signed the consent form today with her single initial.   present and answered all questions for this process. Total 400 units of Botox Lot # /[id number] with Expiration Date: 10/23 was utilized. NDC #8296-3584-18. Diluted with preservative-free normal saline ratio of 100 units per milliliter. This was divided into 2 syringes. 27-gauge recording EMG needle was utilized in conjunction with EMG guidance to identify the most active motor units while avoiding surrounding structures. Today transferred to bed for injections. All areas were cleansed with ChloraPrep. The following muscles were then injected.    Left le. Hamstring, 100 units divided over 2 sites  2. Gastrocnemius,  "Drugs Hives     hives        PHYSICAL EXAM  BP 98/61   Pulse 83   Temp 37.1 °C (98.8 °F) (Temporal)   Resp 16   Ht 1.626 m (5' 4\")   Wt 52.3 kg (115 lb 4.8 oz)   SpO2 97%   BMI 19.79 kg/m²    Nursing note and vitals reviewed.  Constitutional: Well-developed and well-nourished. No distress.   HENT: Head is normocephalic and atraumatic. Oropharynx is clear and moist without exudate or erythema.   Eyes: Pupils are equal, round, and reactive to light. Conjunctiva are normal.   Cardiovascular: Normal rate and regular rhythm. No murmur heard. Normal radial pulses.  Pulmonary/Chest: Breath sounds normal. No wheezes or rales.   Abdominal: Soft and non-tender. No distention    Musculoskeletal: Extremities exhibit normal range of motion without edema or tenderness.   Neurological: Awake, alert and oriented to person, place, and time. No focal deficits noted.  Skin: Skin is warm and dry. 3, 4-5 cm erythema urticarial lesions on the thighs, no airway involvement or shortness of breath.  Psychiatric: Normal mood and affect. Appropriate for clinical situation    INITIAL ASSESSMENT AND PLAN    1:48 PM - Patient was evaluated at bedside for a medication reaction. The patient will be medicated with Monurol 2 g for her symptoms. Having mild allergic reaction, recommend benadryl for symptoms. I do not feel that prednisone is indicated  Patient verbalizes understanding and support with my plan of care.      ED Observation Status? No; Patient does not meet criteria for ED Observation.      DISPOSITION AND DISCUSSIONS    I have discussed management of the patient with the following physicians and HEBER's:  None noted    Discussion of management with other QHP or appropriate source(s): None     Escalation of care considered, and ultimately not performed: acute inpatient care management, however at this time, the patient is most appropriate for outpatient management.    Barriers to care at this time, including but not limited to:  " 50 units divided over 2 sites  3. Adductors 50 units     Right le. Hamstring, 100 units divided over 2 sites  2. Gastrocnemius, 50 units divided over 2 sites  3. Adductors 50 units     Kathryn Prado MD  Physical Medicine & Rehabilitation        None noted .     Decision tools and prescription drugs considered including, but not limited to:  Prednisone is not indicated .    The patient will return for new or worsening symptoms and is stable at the time of discharge.    DISPOSITION:  Patient will be discharged home in stable condition.    FOLLOW UP:  Shailesh Plunkett M.D.  [address] [facility] [address]  [place] [id number] [phone]    Schedule an appointment as soon as possible for a visit       [facility], Emergency Dept  [address]  [place] [address] [phone]    If symptoms worsen    FINAL DIAGNOSIS  1. Rash    2. Allergic reaction, initial encounter    3. UTI due to extended-spectrum beta lactamase (ESBL) producing Escherichia coli         Danelle MORTENSEN (Kathrin), am scribing for, and in the presence of, Stefan Acosta M.D..    Electronically signed by: Danelle Kong (Kathrin), 12/5/2023    IStefan M.D. personally performed the services described in this documentation, as scribed by Danelle Kong in my presence, and it is both accurate and complete.      The note accurately reflects work and decisions made by me.  Stefan Acosta M.D.  12/5/2023  8:27 PM

## 2023-12-14 NOTE — TELEPHONE ENCOUNTER
Dr Prado noted on 10/18/23 annual visit :     Will discuss bone health and send a new referral to endo team at next visit.       Next visit is booked for Jan 23, 2024 to have Botox injections.

## 2024-01-03 ENCOUNTER — THERAPY VISIT (OUTPATIENT)
Dept: OCCUPATIONAL THERAPY | Facility: CLINIC | Age: 39
End: 2024-01-03
Attending: PHYSICAL MEDICINE & REHABILITATION
Payer: COMMERCIAL

## 2024-01-03 DIAGNOSIS — Z78.9 IMPAIRED MOBILITY AND ADLS: Primary | ICD-10-CM

## 2024-01-03 DIAGNOSIS — G82.20 PARAPLEGIA (H): ICD-10-CM

## 2024-01-03 DIAGNOSIS — G82.22 INCOMPLETE PARAPLEGIA (H): ICD-10-CM

## 2024-01-03 DIAGNOSIS — Z74.09 IMPAIRED MOBILITY AND ADLS: Primary | ICD-10-CM

## 2024-01-03 DIAGNOSIS — M62.838 SPASM OF MUSCLE: ICD-10-CM

## 2024-01-03 DIAGNOSIS — M62.838 MUSCLE SPASTICITY: ICD-10-CM

## 2024-01-03 PROCEDURE — 97542 WHEELCHAIR MNGMENT TRAINING: CPT | Mod: GO | Performed by: OCCUPATIONAL THERAPIST
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
## 2024-01-11 ENCOUNTER — TELEPHONE (OUTPATIENT)
Dept: PHYSICAL MEDICINE AND REHAB | Facility: CLINIC | Age: 39
End: 2024-01-11
Payer: COMMERCIAL

## 2024-01-23 ENCOUNTER — OFFICE VISIT (OUTPATIENT)
Dept: PHYSICAL MEDICINE AND REHAB | Facility: CLINIC | Age: 39
End: 2024-01-23
Payer: COMMERCIAL

## 2024-01-23 VITALS — HEART RATE: 76 BPM | DIASTOLIC BLOOD PRESSURE: 94 MMHG | OXYGEN SATURATION: 99 % | SYSTOLIC BLOOD PRESSURE: 189 MMHG

## 2024-01-23 DIAGNOSIS — M81.8 OTHER OSTEOPOROSIS, UNSPECIFIED PATHOLOGICAL FRACTURE PRESENCE: ICD-10-CM

## 2024-01-23 DIAGNOSIS — G82.22 INCOMPLETE PARAPLEGIA (H): ICD-10-CM

## 2024-01-23 DIAGNOSIS — M81.8 OTHER OSTEOPOROSIS WITHOUT CURRENT PATHOLOGICAL FRACTURE: ICD-10-CM

## 2024-01-23 DIAGNOSIS — M62.838 MUSCLE SPASTICITY: ICD-10-CM

## 2024-01-23 DIAGNOSIS — M62.838 SPASM OF MUSCLE: Primary | ICD-10-CM

## 2024-01-23 PROCEDURE — 64642 CHEMODENERV 1 EXTREMITY 1-4: CPT | Performed by: PHYSICAL MEDICINE & REHABILITATION

## 2024-01-23 PROCEDURE — 64643 CHEMODENERV 1 EXTREM 1-4 EA: CPT | Performed by: PHYSICAL MEDICINE & REHABILITATION

## 2024-01-23 PROCEDURE — 95874 GUIDE NERV DESTR NEEDLE EMG: CPT | Performed by: PHYSICAL MEDICINE & REHABILITATION

## 2024-01-23 ASSESSMENT — PAIN SCALES - GENERAL: PAINLEVEL: NO PAIN (0)

## 2024-01-23 NOTE — NURSING NOTE
Chief Complaint   Patient presents with    RECHECK     Botox injections confirmed with patient   BP (!) 189/94   Pulse 76   SpO2 99%     Fina Shankar CMA at 11:24 AM on 1/23/2024.
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
## 2024-05-28 ENCOUNTER — TELEPHONE (OUTPATIENT)
Dept: FAMILY MEDICINE | Facility: CLINIC | Age: 39
End: 2024-05-28
Payer: COMMERCIAL

## 2024-05-28 DIAGNOSIS — J31.0 CHRONIC RHINITIS: ICD-10-CM
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
## 2024-05-29 RX ORDER — CETIRIZINE HYDROCHLORIDE 10 MG/1
10 TABLET ORAL EVERY MORNING
Qty: 90 TABLET | Refills: 3 | OUTPATIENT
Start: 2024-05-29

## 2024-05-29 NOTE — TELEPHONE ENCOUNTER
RN spoke with  on the line and to patient to update. She stated she is scheduled to see a provider 6/11/24.     Olivia Brandon RN on 5/29/2024 at 4:11 PM

## 2024-05-29 NOTE — TELEPHONE ENCOUNTER
I have not seen patient since 9/28/22.  Refill denied.  She needs to schedule routine check up/med recheck with PCP (whoever that is??)  Chen Aleman PA-C

## 2024-06-04 ENCOUNTER — OFFICE VISIT (OUTPATIENT)
Dept: PHYSICAL MEDICINE AND REHAB | Facility: CLINIC | Age: 39
End: 2024-06-04
Payer: COMMERCIAL

## 2024-06-04 DIAGNOSIS — M62.838 MUSCLE SPASTICITY: ICD-10-CM

## 2024-06-04 DIAGNOSIS — M62.838 SPASM OF MUSCLE: Primary | ICD-10-CM

## 2024-06-04 PROCEDURE — 99213 OFFICE O/P EST LOW 20 MIN: CPT | Mod: GC | Performed by: PHYSICAL MEDICINE & REHABILITATION
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
## 2024-06-04 NOTE — PATIENT INSTRUCTIONS
Please call [phone] to schedule your endocrinology visit for bone health management.     We will send update you about [employer] response to inquire about approximate timeframe for new electric wheelchair after your approval in 1/2024.     Thank you for visiting today and please follow-up in 1 year. Message on [employer] with any questions in the meantime.       ----------------------------  [facility] [phone] colleen lyle u ballansato booqashadaada endocrinology ee jack flannery.    Keshav hamlini doonajabari Lin elsy guajardo ee hood cabral.    John trevinooqashadairish maanta oo alvinlan la jennifer 1 joslyn gudahood. Sukumar franklin MyChart widomingai kan'bryce rivera.

## 2024-06-04 NOTE — NURSING NOTE
Chief Complaint   Patient presents with    RECHECK     Here for consult regarding botox, confirmed with patient     Asuncion Ulloa

## 2024-06-05 ENCOUNTER — TELEPHONE (OUTPATIENT)
Dept: PHYSICAL MEDICINE AND REHAB | Facility: CLINIC | Age: 39
End: 2024-06-05
Payer: COMMERCIAL

## 2024-06-05 NOTE — TELEPHONE ENCOUNTER
Writer called [employer] and spoke with Mackenzie.  Called to receive status of where we are with getting patient's wheelchair, and confirm that they have received the letter of medical necessity that was sent to them from [employer].    Mackenzie looked this up and was able to confirm that they received the LMN on 5/28, and states that she will now have it faxed to Dr. Prado for signature, and once they have this they will get it sent to the patient's insurance.  Writer confirmed correct fax# for clinic, [phone]. Mackenzie also stated that she will also contact patient's sister, who is their contact and English speaking relative, to provide her with a status update as well.    Writer will forward this message to Dr. Prado and clinic facilitator to watch for the fax from [employer].    Shilpi Victoria RN on 6/5/2024 at 12:15 PM

## 2024-06-19 ENCOUNTER — MEDICAL CORRESPONDENCE (OUTPATIENT)
Dept: HEALTH INFORMATION MANAGEMENT | Facility: CLINIC | Age: 39
End: 2024-06-19
Payer: COMMERCIAL

## 2024-07-19 ENCOUNTER — MEDICAL CORRESPONDENCE (OUTPATIENT)
Dept: HEALTH INFORMATION MANAGEMENT | Facility: CLINIC | Age: 39
End: 2024-07-19
Payer: COMMERCIAL

## 2024-08-21 NOTE — TELEPHONE ENCOUNTER
Mela    See pt message  DME cued, she has an appointment on 2/28/22 with you    Pooja Cleveland RN   [facility]         Patient was scheduled

## 2025-01-13 ENCOUNTER — PATIENT OUTREACH (OUTPATIENT)
Dept: CARE COORDINATION | Facility: CLINIC | Age: 40
End: 2025-01-13
Payer: COMMERCIAL

## 2025-01-27 ENCOUNTER — PATIENT OUTREACH (OUTPATIENT)
Dept: CARE COORDINATION | Facility: CLINIC | Age: 40
End: 2025-01-27
Payer: COMMERCIAL

## 2025-02-05 ENCOUNTER — MEDICAL CORRESPONDENCE (OUTPATIENT)
Dept: HEALTH INFORMATION MANAGEMENT | Facility: CLINIC | Age: 40
End: 2025-02-05
Payer: COMMERCIAL

## 2025-07-01 DIAGNOSIS — M62.838 SPASM OF MUSCLE: Primary | ICD-10-CM
